# Patient Record
Sex: MALE | Race: WHITE | NOT HISPANIC OR LATINO | URBAN - METROPOLITAN AREA
[De-identification: names, ages, dates, MRNs, and addresses within clinical notes are randomized per-mention and may not be internally consistent; named-entity substitution may affect disease eponyms.]

---

## 2017-06-19 ENCOUNTER — OUTPATIENT (OUTPATIENT)
Dept: OUTPATIENT SERVICES | Facility: HOSPITAL | Age: 82
LOS: 1 days | End: 2017-06-19
Payer: COMMERCIAL

## 2017-06-19 VITALS
TEMPERATURE: 98 F | HEART RATE: 57 BPM | HEIGHT: 71 IN | OXYGEN SATURATION: 95 % | RESPIRATION RATE: 16 BRPM | WEIGHT: 207.9 LBS | SYSTOLIC BLOOD PRESSURE: 175 MMHG | DIASTOLIC BLOOD PRESSURE: 77 MMHG

## 2017-06-19 DIAGNOSIS — H26.9 UNSPECIFIED CATARACT: Chronic | ICD-10-CM

## 2017-06-19 DIAGNOSIS — H33.21 SEROUS RETINAL DETACHMENT, RIGHT EYE: Chronic | ICD-10-CM

## 2017-06-19 DIAGNOSIS — R06.09 OTHER FORMS OF DYSPNEA: ICD-10-CM

## 2017-06-19 DIAGNOSIS — Z96.643 PRESENCE OF ARTIFICIAL HIP JOINT, BILATERAL: Chronic | ICD-10-CM

## 2017-06-19 LAB
ANION GAP SERPL CALC-SCNC: 14 MMOL/L — SIGNIFICANT CHANGE UP (ref 5–17)
BUN SERPL-MCNC: 20 MG/DL — SIGNIFICANT CHANGE UP (ref 7–23)
CALCIUM SERPL-MCNC: 9.1 MG/DL — SIGNIFICANT CHANGE UP (ref 8.4–10.5)
CHLORIDE SERPL-SCNC: 103 MMOL/L — SIGNIFICANT CHANGE UP (ref 96–108)
CO2 SERPL-SCNC: 24 MMOL/L — SIGNIFICANT CHANGE UP (ref 22–31)
CREAT SERPL-MCNC: 0.96 MG/DL — SIGNIFICANT CHANGE UP (ref 0.5–1.3)
GLUCOSE SERPL-MCNC: 106 MG/DL — HIGH (ref 70–99)
HCT VFR BLD CALC: 46.7 % — SIGNIFICANT CHANGE UP (ref 39–50)
HGB BLD-MCNC: 15.4 G/DL — SIGNIFICANT CHANGE UP (ref 13–17)
MCHC RBC-ENTMCNC: 29.9 PG — SIGNIFICANT CHANGE UP (ref 27–34)
MCHC RBC-ENTMCNC: 33 GM/DL — SIGNIFICANT CHANGE UP (ref 32–36)
MCV RBC AUTO: 90.5 FL — SIGNIFICANT CHANGE UP (ref 80–100)
PLATELET # BLD AUTO: 181 K/UL — SIGNIFICANT CHANGE UP (ref 150–400)
POTASSIUM SERPL-MCNC: 4.1 MMOL/L — SIGNIFICANT CHANGE UP (ref 3.5–5.3)
POTASSIUM SERPL-SCNC: 4.1 MMOL/L — SIGNIFICANT CHANGE UP (ref 3.5–5.3)
RBC # BLD: 5.16 M/UL — SIGNIFICANT CHANGE UP (ref 4.2–5.8)
RBC # FLD: 12.5 % — SIGNIFICANT CHANGE UP (ref 10.3–14.5)
SODIUM SERPL-SCNC: 141 MMOL/L — SIGNIFICANT CHANGE UP (ref 135–145)
WBC # BLD: 7.1 K/UL — SIGNIFICANT CHANGE UP (ref 3.8–10.5)
WBC # FLD AUTO: 7.1 K/UL — SIGNIFICANT CHANGE UP (ref 3.8–10.5)

## 2017-06-19 PROCEDURE — 85027 COMPLETE CBC AUTOMATED: CPT

## 2017-06-19 PROCEDURE — 93458 L HRT ARTERY/VENTRICLE ANGIO: CPT

## 2017-06-19 PROCEDURE — 80048 BASIC METABOLIC PNL TOTAL CA: CPT

## 2017-06-19 PROCEDURE — C1769: CPT

## 2017-06-19 PROCEDURE — C1894: CPT

## 2017-06-19 PROCEDURE — 93010 ELECTROCARDIOGRAM REPORT: CPT | Mod: 59

## 2017-06-19 PROCEDURE — C1887: CPT

## 2017-06-19 PROCEDURE — 93005 ELECTROCARDIOGRAM TRACING: CPT

## 2017-06-19 PROCEDURE — 93458 L HRT ARTERY/VENTRICLE ANGIO: CPT | Mod: 26,GC

## 2017-06-19 NOTE — H&P CARDIOLOGY - FAMILY HISTORY
<<-----Click on this checkbox to enter Family History Family history of hypertension in mother     Father  Still living? No  Family history of heart disease, Age at diagnosis: Age Unknown

## 2017-06-19 NOTE — H&P CARDIOLOGY - HISTORY OF PRESENT ILLNESS
83 year old male h/o MI, CAD s/p stents RCA, LAD, HTN, HLD, BPH, anxiety, bilat LE neuropathy, mild-mod AI, mild COPD pt had nuclear ST last Jan revealing small, mod severe fixed defect in apex, EF 69%. TTE 2016: no WMA, EF 68%, dilatate LV, mild-mod AI, moderate PAH, diastolic dysfucntion who presents for C due to progressive RIVERA with inclines and with greater than 2 flights of stairs. Pt denies chest pain.     Cards: Dr Manuel

## 2017-06-19 NOTE — H&P CARDIOLOGY - PMH
Anxiety    BPH (Benign Prostatic Hyperplasia)    CAD (coronary artery disease)    COPD (chronic obstructive pulmonary disease)  mild (no home oxygen)  Hyperlipemia    Hypertension Anxiety    BPH (Benign Prostatic Hyperplasia)    CAD (coronary artery disease)    COPD (chronic obstructive pulmonary disease)  mild (no home oxygen)  Hyperlipemia    Hypertension    Pulmonary hypertension  moderate

## 2017-07-21 PROBLEM — Z86.39 HISTORY OF HYPERLIPIDEMIA: Status: RESOLVED | Noted: 2017-07-21 | Resolved: 2017-07-21

## 2017-07-21 PROBLEM — Z82.49 FAMILY HISTORY OF HYPERTENSION: Status: ACTIVE | Noted: 2017-07-21

## 2017-07-21 PROBLEM — Z82.49 FAMILY HISTORY OF CARDIAC DISORDER: Status: ACTIVE | Noted: 2017-07-21

## 2017-07-21 PROBLEM — Z86.79 HISTORY OF HYPERTENSION: Status: RESOLVED | Noted: 2017-07-21 | Resolved: 2017-07-21

## 2017-07-21 PROBLEM — G62.9 NEUROPATHY: Status: ACTIVE | Noted: 2017-07-21

## 2017-07-21 RX ORDER — ASPIRIN 325 MG/1
325 TABLET ORAL
Refills: 0 | Status: ACTIVE | COMMUNITY

## 2017-07-21 RX ORDER — PRAVASTATIN SODIUM 40 MG/1
40 TABLET ORAL
Refills: 0 | Status: ACTIVE | COMMUNITY

## 2017-07-21 RX ORDER — METOPROLOL TARTRATE 25 MG/1
25 TABLET, FILM COATED ORAL
Refills: 0 | Status: ACTIVE | COMMUNITY

## 2017-07-24 ENCOUNTER — LABORATORY RESULT (OUTPATIENT)
Age: 82
End: 2017-07-24

## 2017-07-24 ENCOUNTER — APPOINTMENT (OUTPATIENT)
Dept: PULMONOLOGY | Facility: CLINIC | Age: 82
End: 2017-07-24

## 2017-07-24 VITALS
SYSTOLIC BLOOD PRESSURE: 130 MMHG | TEMPERATURE: 98.4 F | HEIGHT: 67 IN | DIASTOLIC BLOOD PRESSURE: 66 MMHG | RESPIRATION RATE: 15 BRPM | WEIGHT: 217 LBS | HEART RATE: 58 BPM | BODY MASS INDEX: 34.06 KG/M2

## 2017-07-24 DIAGNOSIS — Z86.79 PERSONAL HISTORY OF OTHER DISEASES OF THE CIRCULATORY SYSTEM: ICD-10-CM

## 2017-07-24 DIAGNOSIS — Z82.49 FAMILY HISTORY OF ISCHEMIC HEART DISEASE AND OTHER DISEASES OF THE CIRCULATORY SYSTEM: ICD-10-CM

## 2017-07-24 DIAGNOSIS — R06.83 SNORING: ICD-10-CM

## 2017-07-24 DIAGNOSIS — Z86.39 PERSONAL HISTORY OF OTHER ENDOCRINE, NUTRITIONAL AND METABOLIC DISEASE: ICD-10-CM

## 2017-07-24 DIAGNOSIS — G62.9 POLYNEUROPATHY, UNSPECIFIED: ICD-10-CM

## 2017-07-24 RX ORDER — GABAPENTIN 300 MG/1
300 CAPSULE ORAL
Refills: 0 | Status: DISCONTINUED | COMMUNITY
End: 2017-07-24

## 2017-07-24 RX ORDER — ALPRAZOLAM 0.5 MG/1
0.5 TABLET ORAL
Refills: 0 | Status: DISCONTINUED | COMMUNITY
End: 2017-07-24

## 2017-07-25 LAB
25(OH)D3 SERPL-MCNC: 38.1 NG/ML
A1AT SERPL-MCNC: 141 MG/DL
ALBUMIN SERPL ELPH-MCNC: 4.6 G/DL
ALP BLD-CCNC: 59 U/L
ALT SERPL-CCNC: 23 U/L
ANION GAP SERPL CALC-SCNC: 14 MMOL/L
APTT BLD: 28.9 SEC
AST SERPL-CCNC: 23 U/L
B2 MICROGLOB SERPL-MCNC: 2.2 MG/L
BASOPHILS # BLD AUTO: 0.03 K/UL
BASOPHILS NFR BLD AUTO: 0.4 %
BILIRUB SERPL-MCNC: 0.6 MG/DL
BUN SERPL-MCNC: 22 MG/DL
CALCIUM SERPL-MCNC: 9.7 MG/DL
CALCIUM SERPL-MCNC: 9.7 MG/DL
CHLORIDE SERPL-SCNC: 104 MMOL/L
CO2 SERPL-SCNC: 25 MMOL/L
CREAT SERPL-MCNC: 1.05 MG/DL
CRP SERPL-MCNC: <0.2 MG/DL
DEPRECATED KAPPA LC FREE/LAMBDA SER: 1.33 RATIO
ENA SCL70 IGG SER IA-ACNC: <0.2 AL
ENA SS-A AB SER IA-ACNC: <0.2 AL
ENA SS-B AB SER IA-ACNC: <0.2 AL
EOSINOPHIL # BLD AUTO: 0.19 K/UL
EOSINOPHIL NFR BLD AUTO: 2.6 %
ERYTHROCYTE [SEDIMENTATION RATE] IN BLOOD BY WESTERGREN METHOD: 5 MM/HR
FOLATE RBC-MCNC: 1800 NG/ML
GLUCOSE SERPL-MCNC: 94 MG/DL
HBA1C MFR BLD HPLC: 5.4 %
HCT VFR BLD CALC: 44 %
HCT VFR BLD CALC: 44 %
HCYS SERPL-MCNC: 9.4 UMOL/L
HGB BLD-MCNC: 14.6 G/DL
IGA SER QL IEP: 102 MG/DL
IGG SER QL IEP: 975 MG/DL
IGM SER QL IEP: 215 MG/DL
IMM GRANULOCYTES NFR BLD AUTO: 0.3 %
INR PPP: 1.03 RATIO
KAPPA LC CSF-MCNC: 1.55 MG/DL
KAPPA LC SERPL-MCNC: 2.06 MG/DL
LYMPHOCYTES # BLD AUTO: 1.35 K/UL
LYMPHOCYTES NFR BLD AUTO: 18.5 %
MAN DIFF?: NORMAL
MCHC RBC-ENTMCNC: 29.6 PG
MCHC RBC-ENTMCNC: 33.2 GM/DL
MCV RBC AUTO: 89.1 FL
MONOCYTES # BLD AUTO: 0.51 K/UL
MONOCYTES NFR BLD AUTO: 7 %
MPO AB + PR3 PNL SER: NORMAL
NEUTROPHILS # BLD AUTO: 5.19 K/UL
NEUTROPHILS NFR BLD AUTO: 71.2 %
NT-PROBNP SERPL-MCNC: 209 PG/ML
PARATHYROID HORMONE INTACT: 59 PG/ML
PHOSPHATE SERPL-MCNC: 2.5 MG/DL
PLATELET # BLD AUTO: 198 K/UL
POTASSIUM SERPL-SCNC: 4.6 MMOL/L
PROT SERPL-MCNC: 7.3 G/DL
PT BLD: 11.7 SEC
RBC # BLD: 4.94 M/UL
RBC # FLD: 13.8 %
RHEUMATOID FACT SER QL: <7 IU/ML
SODIUM SERPL-SCNC: 143 MMOL/L
T3 SERPL-MCNC: 108 NG/DL
T3FREE SERPL-MCNC: 3.39 PG/ML
T3RU NFR SERPL: 0.86 INDEX
T4 FREE SERPL-MCNC: 1.4 NG/DL
TSH SERPL-ACNC: 1.89 UIU/ML
URATE SERPL-MCNC: 4.3 MG/DL
VIT B12 SERPL-MCNC: 322 PG/ML
WBC # FLD AUTO: 7.29 K/UL

## 2017-07-26 LAB
ANA PAT FLD IF-IMP: ABNORMAL
ANACR T: ABNORMAL
CCP AB SER IA-ACNC: <8 UNITS
RF+CCP IGG SER-IMP: NEGATIVE

## 2017-07-27 ENCOUNTER — APPOINTMENT (OUTPATIENT)
Dept: CT IMAGING | Facility: CLINIC | Age: 82
End: 2017-07-27
Payer: COMMERCIAL

## 2017-07-27 ENCOUNTER — OUTPATIENT (OUTPATIENT)
Dept: OUTPATIENT SERVICES | Facility: HOSPITAL | Age: 82
LOS: 1 days | End: 2017-07-27
Payer: COMMERCIAL

## 2017-07-27 DIAGNOSIS — H33.21 SEROUS RETINAL DETACHMENT, RIGHT EYE: Chronic | ICD-10-CM

## 2017-07-27 DIAGNOSIS — I27.2 OTHER SECONDARY PULMONARY HYPERTENSION: ICD-10-CM

## 2017-07-27 DIAGNOSIS — Z96.643 PRESENCE OF ARTIFICIAL HIP JOINT, BILATERAL: Chronic | ICD-10-CM

## 2017-07-27 DIAGNOSIS — H26.9 UNSPECIFIED CATARACT: Chronic | ICD-10-CM

## 2017-07-27 LAB — COLLAGEN CTX SERPL-MCNC: 210 PG/ML

## 2017-07-27 PROCEDURE — 71250 CT THORAX DX C-: CPT

## 2017-07-27 PROCEDURE — 71250 CT THORAX DX C-: CPT | Mod: 26

## 2017-07-28 LAB
CARDIOLIPIN AB SER IA-ACNC: POSITIVE
TOTAL IGE SMQN RAST: 106 KU/L

## 2017-07-31 LAB — T4 FREE SERPL DIALY-MCNC: 1 NG/DL

## 2017-08-01 ENCOUNTER — TRANSCRIPTION ENCOUNTER (OUTPATIENT)
Age: 82
End: 2017-08-01

## 2017-09-09 ENCOUNTER — OUTPATIENT (OUTPATIENT)
Dept: OUTPATIENT SERVICES | Facility: HOSPITAL | Age: 82
LOS: 1 days | End: 2017-09-09
Payer: COMMERCIAL

## 2017-09-09 ENCOUNTER — APPOINTMENT (OUTPATIENT)
Dept: SLEEP CENTER | Facility: CLINIC | Age: 82
End: 2017-09-09
Payer: COMMERCIAL

## 2017-09-09 DIAGNOSIS — Z96.643 PRESENCE OF ARTIFICIAL HIP JOINT, BILATERAL: Chronic | ICD-10-CM

## 2017-09-09 DIAGNOSIS — H26.9 UNSPECIFIED CATARACT: Chronic | ICD-10-CM

## 2017-09-09 DIAGNOSIS — H33.21 SEROUS RETINAL DETACHMENT, RIGHT EYE: Chronic | ICD-10-CM

## 2017-09-09 PROCEDURE — 95810 POLYSOM 6/> YRS 4/> PARAM: CPT

## 2017-09-09 PROCEDURE — 95810 POLYSOM 6/> YRS 4/> PARAM: CPT | Mod: 26

## 2017-09-11 DIAGNOSIS — G47.33 OBSTRUCTIVE SLEEP APNEA (ADULT) (PEDIATRIC): ICD-10-CM

## 2017-09-20 ENCOUNTER — RESULT REVIEW (OUTPATIENT)
Age: 82
End: 2017-09-20

## 2017-09-21 ENCOUNTER — APPOINTMENT (OUTPATIENT)
Dept: PULMONOLOGY | Facility: CLINIC | Age: 82
End: 2017-09-21
Payer: COMMERCIAL

## 2017-09-21 VITALS
TEMPERATURE: 98.4 F | RESPIRATION RATE: 15 BRPM | DIASTOLIC BLOOD PRESSURE: 64 MMHG | SYSTOLIC BLOOD PRESSURE: 120 MMHG | HEART RATE: 55 BPM | WEIGHT: 216 LBS | BODY MASS INDEX: 33.9 KG/M2 | HEIGHT: 67 IN

## 2017-09-21 PROCEDURE — 99215 OFFICE O/P EST HI 40 MIN: CPT

## 2017-11-10 ENCOUNTER — OUTPATIENT (OUTPATIENT)
Dept: OUTPATIENT SERVICES | Facility: HOSPITAL | Age: 82
LOS: 1 days | End: 2017-11-10
Payer: COMMERCIAL

## 2017-11-10 ENCOUNTER — APPOINTMENT (OUTPATIENT)
Dept: SLEEP CENTER | Facility: CLINIC | Age: 82
End: 2017-11-10
Payer: COMMERCIAL

## 2017-11-10 DIAGNOSIS — Z96.643 PRESENCE OF ARTIFICIAL HIP JOINT, BILATERAL: Chronic | ICD-10-CM

## 2017-11-10 DIAGNOSIS — H26.9 UNSPECIFIED CATARACT: Chronic | ICD-10-CM

## 2017-11-10 DIAGNOSIS — H33.21 SEROUS RETINAL DETACHMENT, RIGHT EYE: Chronic | ICD-10-CM

## 2017-11-10 PROCEDURE — 95811 POLYSOM 6/>YRS CPAP 4/> PARM: CPT | Mod: 26

## 2017-11-10 PROCEDURE — 95811 POLYSOM 6/>YRS CPAP 4/> PARM: CPT

## 2017-11-12 ENCOUNTER — EMERGENCY (EMERGENCY)
Facility: HOSPITAL | Age: 82
LOS: 1 days | Discharge: HOME | End: 2017-11-12
Attending: EMERGENCY MEDICINE | Admitting: EMERGENCY MEDICINE
Payer: COMMERCIAL

## 2017-11-12 VITALS
RESPIRATION RATE: 16 BRPM | HEART RATE: 94 BPM | OXYGEN SATURATION: 96 % | DIASTOLIC BLOOD PRESSURE: 73 MMHG | WEIGHT: 210.1 LBS | TEMPERATURE: 99 F | HEIGHT: 71 IN | SYSTOLIC BLOOD PRESSURE: 168 MMHG

## 2017-11-12 VITALS
HEART RATE: 88 BPM | SYSTOLIC BLOOD PRESSURE: 167 MMHG | OXYGEN SATURATION: 97 % | DIASTOLIC BLOOD PRESSURE: 70 MMHG | RESPIRATION RATE: 16 BRPM | TEMPERATURE: 98 F

## 2017-11-12 DIAGNOSIS — Z96.643 PRESENCE OF ARTIFICIAL HIP JOINT, BILATERAL: Chronic | ICD-10-CM

## 2017-11-12 DIAGNOSIS — H26.9 UNSPECIFIED CATARACT: Chronic | ICD-10-CM

## 2017-11-12 DIAGNOSIS — H33.21 SEROUS RETINAL DETACHMENT, RIGHT EYE: Chronic | ICD-10-CM

## 2017-11-12 LAB
ALBUMIN SERPL ELPH-MCNC: 3.6 G/DL — SIGNIFICANT CHANGE UP (ref 3.3–5)
ALP SERPL-CCNC: 57 U/L — SIGNIFICANT CHANGE UP (ref 30–120)
ALT FLD-CCNC: 19 U/L DA — SIGNIFICANT CHANGE UP (ref 10–60)
AMYLASE P1 CFR SERPL: 50 U/L — SIGNIFICANT CHANGE UP (ref 25–125)
ANION GAP SERPL CALC-SCNC: 5 MMOL/L — SIGNIFICANT CHANGE UP (ref 5–17)
AST SERPL-CCNC: 26 U/L — SIGNIFICANT CHANGE UP (ref 10–40)
BASOPHILS # BLD AUTO: 0.1 K/UL — SIGNIFICANT CHANGE UP (ref 0–0.2)
BASOPHILS NFR BLD AUTO: 0.9 % — SIGNIFICANT CHANGE UP (ref 0–2)
BILIRUB SERPL-MCNC: 0.8 MG/DL — SIGNIFICANT CHANGE UP (ref 0.2–1.2)
BUN SERPL-MCNC: 21 MG/DL — SIGNIFICANT CHANGE UP (ref 7–23)
CALCIUM SERPL-MCNC: 8.9 MG/DL — SIGNIFICANT CHANGE UP (ref 8.4–10.5)
CHLORIDE SERPL-SCNC: 103 MMOL/L — SIGNIFICANT CHANGE UP (ref 96–108)
CK MB BLD-MCNC: 0.7 % — SIGNIFICANT CHANGE UP (ref 0–3.5)
CK MB CFR SERPL CALC: 2 NG/ML — SIGNIFICANT CHANGE UP (ref 0–3.6)
CK SERPL-CCNC: 283 U/L — SIGNIFICANT CHANGE UP (ref 39–308)
CO2 SERPL-SCNC: 28 MMOL/L — SIGNIFICANT CHANGE UP (ref 22–31)
CREAT SERPL-MCNC: 0.9 MG/DL — SIGNIFICANT CHANGE UP (ref 0.5–1.3)
EOSINOPHIL # BLD AUTO: 0.1 K/UL — SIGNIFICANT CHANGE UP (ref 0–0.5)
EOSINOPHIL NFR BLD AUTO: 0.4 % — SIGNIFICANT CHANGE UP (ref 0–6)
GLUCOSE SERPL-MCNC: 139 MG/DL — HIGH (ref 70–99)
HCT VFR BLD CALC: 45.3 % — SIGNIFICANT CHANGE UP (ref 39–50)
HGB BLD-MCNC: 14.8 G/DL — SIGNIFICANT CHANGE UP (ref 13–17)
LIDOCAIN IGE QN: 102 U/L — SIGNIFICANT CHANGE UP (ref 73–393)
LYMPHOCYTES # BLD AUTO: 0.7 K/UL — LOW (ref 1–3.3)
LYMPHOCYTES # BLD AUTO: 5 % — LOW (ref 13–44)
MCHC RBC-ENTMCNC: 29.1 PG — SIGNIFICANT CHANGE UP (ref 27–34)
MCHC RBC-ENTMCNC: 32.6 GM/DL — SIGNIFICANT CHANGE UP (ref 32–36)
MCV RBC AUTO: 89.2 FL — SIGNIFICANT CHANGE UP (ref 80–100)
MONOCYTES # BLD AUTO: 1 K/UL — HIGH (ref 0–0.9)
MONOCYTES NFR BLD AUTO: 7.1 % — SIGNIFICANT CHANGE UP (ref 2–14)
NEUTROPHILS # BLD AUTO: 11.9 K/UL — HIGH (ref 1.8–7.4)
NEUTROPHILS NFR BLD AUTO: 86.7 % — HIGH (ref 43–77)
PLATELET # BLD AUTO: 174 K/UL — SIGNIFICANT CHANGE UP (ref 150–400)
POTASSIUM SERPL-MCNC: 4.2 MMOL/L — SIGNIFICANT CHANGE UP (ref 3.5–5.3)
POTASSIUM SERPL-SCNC: 4.2 MMOL/L — SIGNIFICANT CHANGE UP (ref 3.5–5.3)
PROT SERPL-MCNC: 7 G/DL — SIGNIFICANT CHANGE UP (ref 6–8.3)
RBC # BLD: 5.08 M/UL — SIGNIFICANT CHANGE UP (ref 4.2–5.8)
RBC # FLD: 12.6 % — SIGNIFICANT CHANGE UP (ref 10.3–14.5)
SODIUM SERPL-SCNC: 136 MMOL/L — SIGNIFICANT CHANGE UP (ref 135–145)
TROPONIN I SERPL-MCNC: 0.01 NG/ML — LOW (ref 0.02–0.06)
WBC # BLD: 13.7 K/UL — HIGH (ref 3.8–10.5)
WBC # FLD AUTO: 13.7 K/UL — HIGH (ref 3.8–10.5)

## 2017-11-12 PROCEDURE — 84484 ASSAY OF TROPONIN QUANT: CPT

## 2017-11-12 PROCEDURE — 83690 ASSAY OF LIPASE: CPT

## 2017-11-12 PROCEDURE — 85027 COMPLETE CBC AUTOMATED: CPT

## 2017-11-12 PROCEDURE — 82150 ASSAY OF AMYLASE: CPT

## 2017-11-12 PROCEDURE — 36415 COLL VENOUS BLD VENIPUNCTURE: CPT

## 2017-11-12 PROCEDURE — 82550 ASSAY OF CK (CPK): CPT

## 2017-11-12 PROCEDURE — 99283 EMERGENCY DEPT VISIT LOW MDM: CPT | Mod: 25

## 2017-11-12 PROCEDURE — 71046 X-RAY EXAM CHEST 2 VIEWS: CPT

## 2017-11-12 PROCEDURE — 71020: CPT | Mod: 26

## 2017-11-12 PROCEDURE — 80053 COMPREHEN METABOLIC PANEL: CPT

## 2017-11-12 PROCEDURE — 99284 EMERGENCY DEPT VISIT MOD MDM: CPT

## 2017-11-12 PROCEDURE — 82553 CREATINE MB FRACTION: CPT

## 2017-11-12 RX ORDER — FLUTICASONE PROPIONATE 50 MCG
1 SPRAY, SUSPENSION NASAL
Qty: 0 | Refills: 0 | COMMUNITY

## 2017-11-12 RX ORDER — OXYBUTYNIN CHLORIDE 5 MG
0 TABLET ORAL
Qty: 0 | Refills: 0 | COMMUNITY

## 2017-11-12 RX ORDER — FAMOTIDINE 10 MG/ML
20 INJECTION INTRAVENOUS ONCE
Qty: 0 | Refills: 0 | Status: COMPLETED | OUTPATIENT
Start: 2017-11-12 | End: 2017-11-12

## 2017-11-12 RX ORDER — FAMOTIDINE 10 MG/ML
20 INJECTION INTRAVENOUS ONCE
Qty: 0 | Refills: 0 | Status: DISCONTINUED | OUTPATIENT
Start: 2017-11-12 | End: 2017-11-12

## 2017-11-12 RX ADMIN — Medication 30 MILLILITER(S): at 21:56

## 2017-11-12 RX ADMIN — FAMOTIDINE 20 MILLIGRAM(S): 10 INJECTION INTRAVENOUS at 21:59

## 2017-11-12 NOTE — ED PROVIDER NOTE - MEDICAL DECISION MAKING DETAILS
83 yo m with hx of MI, GERD, HTN with upper abdominal pain, n/v/d x 1 day, will get labs, ekg, cxr, cards, pepcid/maalox, re-assess; Feeling better, labs reviewed wnl, will d/c home, f/u pmd tomorrow.

## 2017-11-12 NOTE — ED PROVIDER NOTE - OBJECTIVE STATEMENT
85 y/o M 85 y/o M with hx of MI, stents x 2, GERD, sleep apnea, HTN, HLD, enlarged prostate presents with c/o upper abdominal pain x 1 day. Pt states that he ate soup yesterday and had indigestion with 2 episodes of vomiting last night and few episodes of non-bloody diarrhea as well. States that pt started having upper abdominal pain this afternoon and had an episode of cold sweats. Denies chest pain, shortness of breath, fever, dizziness, vision changes, numbness, tingling, weakness, syncope, recent travel, sick contacts or other symptoms. States that he took aspirin today prior to arrival.

## 2017-11-12 NOTE — ED PROVIDER NOTE - ABDOMINAL EXAM
no organomegaly/obese, +mild diffuse upper abdomen ttp, no rebound or guarding, neg murphys sign, neg mcburneys point tenderness, no CVAT B/no pulsating masses/tender.../soft

## 2017-11-12 NOTE — ED PROVIDER NOTE - ATTENDING CONTRIBUTION TO CARE
Ze Shelton MD: I have personally performed a face to face diagnostic evaluation on this patient.  I have reviewed the PA note and agree with the history, exam, and plan of care, except as noted.  History and Exam by me shows same findings as documented

## 2017-11-12 NOTE — ED PROVIDER NOTE - PROGRESS NOTE DETAILS
Patient seen and examined. Mild epigastric tenderness. Exam o/w unremarkable. Will check ekg and a set of enzymes Pt examined by ED attending, Dr. Shelton who agreed with disposition and plan.   Labs, ekg and cxr reviewed with attending, advised to d/c home and f/u pmd  Pt re-assessed and feeling better, neg cards, lfts wnl, ekg and cxr wnl. Attending note: Patient seen and examined. Mild epigastric tenderness. Exam o/w unremarkable. Will check ekg and a set of enzymes

## 2017-11-12 NOTE — ED ADULT TRIAGE NOTE - CHIEF COMPLAINT QUOTE
" I have a severe heart burn and I vomited yesterday. This after I started having lower abdominal pain and upper back pain "

## 2017-11-12 NOTE — ED ADULT NURSE NOTE - PMH
Anxiety    BPH (Benign Prostatic Hyperplasia)    CAD (coronary artery disease)    COPD (chronic obstructive pulmonary disease)  mild (no home oxygen)  Hyperlipemia    Hypertension    Pulmonary hypertension  moderate

## 2017-11-13 DIAGNOSIS — G47.33 OBSTRUCTIVE SLEEP APNEA (ADULT) (PEDIATRIC): ICD-10-CM

## 2017-12-13 ENCOUNTER — APPOINTMENT (OUTPATIENT)
Dept: UROLOGY | Facility: CLINIC | Age: 82
End: 2017-12-13
Payer: COMMERCIAL

## 2017-12-13 VITALS
HEIGHT: 67 IN | SYSTOLIC BLOOD PRESSURE: 121 MMHG | WEIGHT: 210 LBS | HEART RATE: 58 BPM | BODY MASS INDEX: 32.96 KG/M2 | TEMPERATURE: 98 F | DIASTOLIC BLOOD PRESSURE: 58 MMHG | OXYGEN SATURATION: 97 %

## 2017-12-13 DIAGNOSIS — I25.2 OLD MYOCARDIAL INFARCTION: ICD-10-CM

## 2017-12-13 DIAGNOSIS — Z87.891 PERSONAL HISTORY OF NICOTINE DEPENDENCE: ICD-10-CM

## 2017-12-13 PROCEDURE — 99214 OFFICE O/P EST MOD 30 MIN: CPT

## 2017-12-13 RX ORDER — MIRABEGRON 25 MG/1
25 TABLET, FILM COATED, EXTENDED RELEASE ORAL
Refills: 0 | Status: DISCONTINUED | COMMUNITY
Start: 2017-09-21 | End: 2017-12-13

## 2018-01-16 ENCOUNTER — APPOINTMENT (OUTPATIENT)
Dept: PULMONOLOGY | Facility: CLINIC | Age: 83
End: 2018-01-16
Payer: MEDICARE

## 2018-01-16 VITALS
HEART RATE: 64 BPM | DIASTOLIC BLOOD PRESSURE: 70 MMHG | OXYGEN SATURATION: 98 % | WEIGHT: 209 LBS | RESPIRATION RATE: 18 BRPM | HEIGHT: 67 IN | TEMPERATURE: 97.5 F | SYSTOLIC BLOOD PRESSURE: 140 MMHG | BODY MASS INDEX: 32.8 KG/M2

## 2018-01-16 LAB
BASOPHILS # BLD AUTO: 0.04 K/UL
BASOPHILS NFR BLD AUTO: 0.5 %
EOSINOPHIL # BLD AUTO: 0.17 K/UL
EOSINOPHIL NFR BLD AUTO: 2 %
HCT VFR BLD CALC: 42.5 %
HGB BLD-MCNC: 13.9 G/DL
IMM GRANULOCYTES NFR BLD AUTO: 0.2 %
LYMPHOCYTES # BLD AUTO: 1.38 K/UL
LYMPHOCYTES NFR BLD AUTO: 16.2 %
MAN DIFF?: NORMAL
MCHC RBC-ENTMCNC: 29 PG
MCHC RBC-ENTMCNC: 32.7 GM/DL
MCV RBC AUTO: 88.5 FL
MONOCYTES # BLD AUTO: 0.8 K/UL
MONOCYTES NFR BLD AUTO: 9.4 %
NEUTROPHILS # BLD AUTO: 6.09 K/UL
NEUTROPHILS NFR BLD AUTO: 71.7 %
PLATELET # BLD AUTO: 216 K/UL
RBC # BLD: 4.8 M/UL
RBC # FLD: 13.8 %
WBC # FLD AUTO: 8.5 K/UL

## 2018-01-16 PROCEDURE — 36415 COLL VENOUS BLD VENIPUNCTURE: CPT

## 2018-01-16 PROCEDURE — 99215 OFFICE O/P EST HI 40 MIN: CPT | Mod: 25

## 2018-01-16 RX ORDER — FLUTICASONE FUROATE AND VILANTEROL TRIFENATATE 100; 25 UG/1; UG/1
100-25 POWDER RESPIRATORY (INHALATION) DAILY
Qty: 1 | Refills: 5 | Status: DISCONTINUED | COMMUNITY
Start: 2017-07-24 | End: 2018-01-16

## 2018-01-17 LAB
ALBUMIN SERPL ELPH-MCNC: 4.2 G/DL
ALP BLD-CCNC: 58 U/L
ALT SERPL-CCNC: 12 U/L
ANION GAP SERPL CALC-SCNC: 13 MMOL/L
AST SERPL-CCNC: 16 U/L
BILIRUB SERPL-MCNC: 0.5 MG/DL
BUN SERPL-MCNC: 20 MG/DL
CALCIUM SERPL-MCNC: 9.3 MG/DL
CHLORIDE SERPL-SCNC: 104 MMOL/L
CO2 SERPL-SCNC: 24 MMOL/L
CREAT SERPL-MCNC: 0.98 MG/DL
GLUCOSE SERPL-MCNC: 112 MG/DL
POTASSIUM SERPL-SCNC: 4.5 MMOL/L
PROT SERPL-MCNC: 7.2 G/DL
SODIUM SERPL-SCNC: 141 MMOL/L

## 2018-01-18 ENCOUNTER — APPOINTMENT (OUTPATIENT)
Dept: SLEEP CENTER | Facility: CLINIC | Age: 83
End: 2018-01-18
Payer: COMMERCIAL

## 2018-01-18 ENCOUNTER — OUTPATIENT (OUTPATIENT)
Dept: OUTPATIENT SERVICES | Facility: HOSPITAL | Age: 83
LOS: 1 days | End: 2018-01-18
Payer: COMMERCIAL

## 2018-01-18 DIAGNOSIS — H33.21 SEROUS RETINAL DETACHMENT, RIGHT EYE: Chronic | ICD-10-CM

## 2018-01-18 DIAGNOSIS — H26.9 UNSPECIFIED CATARACT: Chronic | ICD-10-CM

## 2018-01-18 DIAGNOSIS — Z96.643 PRESENCE OF ARTIFICIAL HIP JOINT, BILATERAL: Chronic | ICD-10-CM

## 2018-01-18 PROCEDURE — 95811 POLYSOM 6/>YRS CPAP 4/> PARM: CPT

## 2018-01-18 PROCEDURE — 95811 POLYSOM 6/>YRS CPAP 4/> PARM: CPT | Mod: 26

## 2018-01-19 DIAGNOSIS — G47.33 OBSTRUCTIVE SLEEP APNEA (ADULT) (PEDIATRIC): ICD-10-CM

## 2018-01-31 ENCOUNTER — RESULT REVIEW (OUTPATIENT)
Age: 83
End: 2018-01-31

## 2018-01-31 DIAGNOSIS — G47.31 PRIMARY CENTRAL SLEEP APNEA: ICD-10-CM

## 2018-02-09 ENCOUNTER — OUTPATIENT (OUTPATIENT)
Dept: OUTPATIENT SERVICES | Facility: HOSPITAL | Age: 83
LOS: 1 days | End: 2018-02-09
Payer: COMMERCIAL

## 2018-02-09 ENCOUNTER — APPOINTMENT (OUTPATIENT)
Dept: CT IMAGING | Facility: CLINIC | Age: 83
End: 2018-02-09

## 2018-02-09 DIAGNOSIS — Z00.8 ENCOUNTER FOR OTHER GENERAL EXAMINATION: ICD-10-CM

## 2018-02-09 DIAGNOSIS — H26.9 UNSPECIFIED CATARACT: Chronic | ICD-10-CM

## 2018-02-09 DIAGNOSIS — Z96.643 PRESENCE OF ARTIFICIAL HIP JOINT, BILATERAL: Chronic | ICD-10-CM

## 2018-02-09 DIAGNOSIS — H33.21 SEROUS RETINAL DETACHMENT, RIGHT EYE: Chronic | ICD-10-CM

## 2018-02-09 PROCEDURE — 71250 CT THORAX DX C-: CPT | Mod: 26

## 2018-02-09 PROCEDURE — 71250 CT THORAX DX C-: CPT

## 2018-05-24 ENCOUNTER — APPOINTMENT (OUTPATIENT)
Dept: PULMONOLOGY | Facility: CLINIC | Age: 83
End: 2018-05-24
Payer: COMMERCIAL

## 2018-05-24 VITALS
DIASTOLIC BLOOD PRESSURE: 70 MMHG | WEIGHT: 214 LBS | OXYGEN SATURATION: 94 % | HEART RATE: 59 BPM | HEIGHT: 67 IN | TEMPERATURE: 97.4 F | BODY MASS INDEX: 33.59 KG/M2 | SYSTOLIC BLOOD PRESSURE: 120 MMHG | RESPIRATION RATE: 18 BRPM

## 2018-05-24 PROCEDURE — 99215 OFFICE O/P EST HI 40 MIN: CPT

## 2018-05-24 RX ORDER — LOSARTAN POTASSIUM 100 MG/1
100 TABLET, FILM COATED ORAL DAILY
Qty: 30 | Refills: 0 | Status: ACTIVE | COMMUNITY
Start: 2018-05-24

## 2018-05-24 RX ORDER — FOSINOPRIL SODIUM 40 MG
40 TABLET ORAL
Refills: 0 | Status: DISCONTINUED | COMMUNITY
End: 2018-05-24

## 2018-05-25 LAB
ALBUMIN SERPL ELPH-MCNC: 4.1 G/DL
ALP BLD-CCNC: 62 U/L
ALT SERPL-CCNC: 17 U/L
ANION GAP SERPL CALC-SCNC: 12 MMOL/L
AST SERPL-CCNC: 18 U/L
BASOPHILS # BLD AUTO: 0.02 K/UL
BASOPHILS NFR BLD AUTO: 0.3 %
BILIRUB SERPL-MCNC: 0.8 MG/DL
BUN SERPL-MCNC: 20 MG/DL
CALCIUM SERPL-MCNC: 9.6 MG/DL
CHLORIDE SERPL-SCNC: 101 MMOL/L
CO2 SERPL-SCNC: 27 MMOL/L
CREAT SERPL-MCNC: 1.17 MG/DL
EOSINOPHIL # BLD AUTO: 0.29 K/UL
EOSINOPHIL NFR BLD AUTO: 3.7 %
GLUCOSE SERPL-MCNC: 99 MG/DL
HCT VFR BLD CALC: 42.6 %
HGB BLD-MCNC: 13.9 G/DL
IMM GRANULOCYTES NFR BLD AUTO: 0.3 %
LYMPHOCYTES # BLD AUTO: 1.51 K/UL
LYMPHOCYTES NFR BLD AUTO: 19.4 %
MAN DIFF?: NORMAL
MCHC RBC-ENTMCNC: 29.4 PG
MCHC RBC-ENTMCNC: 32.6 GM/DL
MCV RBC AUTO: 90.1 FL
MONOCYTES # BLD AUTO: 0.52 K/UL
MONOCYTES NFR BLD AUTO: 6.7 %
NEUTROPHILS # BLD AUTO: 5.41 K/UL
NEUTROPHILS NFR BLD AUTO: 69.6 %
PLATELET # BLD AUTO: 215 K/UL
POTASSIUM SERPL-SCNC: 3.9 MMOL/L
PROT SERPL-MCNC: 6.9 G/DL
RBC # BLD: 4.73 M/UL
RBC # FLD: 13.9 %
SODIUM SERPL-SCNC: 140 MMOL/L
TSH SERPL-ACNC: 1.8 UIU/ML
WBC # FLD AUTO: 7.77 K/UL

## 2018-05-29 LAB — NT-PROBNP SERPL-MCNC: 156 PG/ML

## 2018-07-18 PROBLEM — I27.2 OTHER SECONDARY PULMONARY HYPERTENSION: Chronic | Status: ACTIVE | Noted: 2017-06-19

## 2018-07-18 PROBLEM — J44.9 CHRONIC OBSTRUCTIVE PULMONARY DISEASE, UNSPECIFIED: Chronic | Status: ACTIVE | Noted: 2017-06-19

## 2018-08-01 ENCOUNTER — APPOINTMENT (OUTPATIENT)
Dept: UROLOGY | Facility: CLINIC | Age: 83
End: 2018-08-01
Payer: MEDICARE

## 2018-08-01 VITALS
RESPIRATION RATE: 18 BRPM | SYSTOLIC BLOOD PRESSURE: 111 MMHG | OXYGEN SATURATION: 95 % | HEIGHT: 67 IN | BODY MASS INDEX: 33.27 KG/M2 | DIASTOLIC BLOOD PRESSURE: 65 MMHG | WEIGHT: 212 LBS | HEART RATE: 64 BPM

## 2018-08-01 DIAGNOSIS — Z87.09 PERSONAL HISTORY OF OTHER DISEASES OF THE RESPIRATORY SYSTEM: ICD-10-CM

## 2018-08-01 DIAGNOSIS — H33.311 HORSESHOE TEAR OF RETINA W/OUT DETACHMENT, RIGHT EYE: ICD-10-CM

## 2018-08-01 DIAGNOSIS — Z82.49 FAMILY HISTORY OF ISCHEMIC HEART DISEASE AND OTHER DISEASES OF THE CIRCULATORY SYSTEM: ICD-10-CM

## 2018-08-01 DIAGNOSIS — Z86.69 PERSONAL HISTORY OF OTHER DISEASES OF THE NERVOUS SYSTEM AND SENSE ORGANS: ICD-10-CM

## 2018-08-01 DIAGNOSIS — Z82.3 FAMILY HISTORY OF STROKE: ICD-10-CM

## 2018-08-01 PROCEDURE — 99213 OFFICE O/P EST LOW 20 MIN: CPT

## 2018-08-01 RX ORDER — AZELASTINE HYDROCHLORIDE 0.5 MG/ML
0.05 SOLUTION/ DROPS OPHTHALMIC
Refills: 0 | Status: DISCONTINUED | COMMUNITY
End: 2018-08-01

## 2018-08-01 RX ORDER — FINASTERIDE 5 MG/1
5 TABLET, FILM COATED ORAL
Refills: 0 | Status: DISCONTINUED | COMMUNITY
End: 2018-08-01

## 2018-08-01 RX ORDER — TAMSULOSIN HCL 0.4 MG
0.4 CAPSULE ORAL
Refills: 0 | Status: COMPLETED | COMMUNITY
End: 2018-08-01

## 2018-08-05 PROBLEM — Z87.09 HISTORY OF CHRONIC OBSTRUCTIVE LUNG DISEASE: Status: RESOLVED | Noted: 2017-12-13 | Resolved: 2018-08-05

## 2018-08-05 PROBLEM — H33.311 RETINAL TEAR OF RIGHT EYE: Status: RESOLVED | Noted: 2017-12-13 | Resolved: 2018-08-05

## 2018-08-05 PROBLEM — Z86.69 HISTORY OF DOUBLE VISION: Status: RESOLVED | Noted: 2017-12-13 | Resolved: 2018-08-05

## 2018-09-01 ENCOUNTER — MOBILE ON CALL (OUTPATIENT)
Age: 83
End: 2018-09-01

## 2018-11-05 ENCOUNTER — RX RENEWAL (OUTPATIENT)
Age: 83
End: 2018-11-05

## 2018-11-05 ENCOUNTER — APPOINTMENT (OUTPATIENT)
Dept: PULMONOLOGY | Facility: CLINIC | Age: 83
End: 2018-11-05
Payer: COMMERCIAL

## 2018-11-05 VITALS
BODY MASS INDEX: 34.37 KG/M2 | DIASTOLIC BLOOD PRESSURE: 59 MMHG | HEIGHT: 67 IN | TEMPERATURE: 97.7 F | WEIGHT: 219 LBS | OXYGEN SATURATION: 95 % | RESPIRATION RATE: 18 BRPM | SYSTOLIC BLOOD PRESSURE: 112 MMHG | HEART RATE: 59 BPM

## 2018-11-05 PROCEDURE — 99215 OFFICE O/P EST HI 40 MIN: CPT | Mod: 25

## 2018-11-05 PROCEDURE — 36415 COLL VENOUS BLD VENIPUNCTURE: CPT

## 2018-11-05 RX ORDER — AZELASTINE HYDROCHLORIDE 137 UG/1
0.1 SPRAY, METERED NASAL DAILY
Qty: 1 | Refills: 2 | Status: DISCONTINUED | COMMUNITY
Start: 2018-05-24 | End: 2018-11-05

## 2018-11-05 RX ORDER — FUROSEMIDE 20 MG/1
20 TABLET ORAL
Qty: 12 | Refills: 3 | Status: DISCONTINUED | COMMUNITY
Start: 2018-01-16 | End: 2018-11-05

## 2018-11-19 ENCOUNTER — FORM ENCOUNTER (OUTPATIENT)
Age: 83
End: 2018-11-19

## 2018-11-20 ENCOUNTER — OUTPATIENT (OUTPATIENT)
Dept: OUTPATIENT SERVICES | Facility: HOSPITAL | Age: 83
LOS: 1 days | End: 2018-11-20
Payer: COMMERCIAL

## 2018-11-20 ENCOUNTER — APPOINTMENT (OUTPATIENT)
Dept: CT IMAGING | Facility: CLINIC | Age: 83
End: 2018-11-20
Payer: MEDICARE

## 2018-11-20 DIAGNOSIS — Z96.643 PRESENCE OF ARTIFICIAL HIP JOINT, BILATERAL: Chronic | ICD-10-CM

## 2018-11-20 DIAGNOSIS — H33.21 SEROUS RETINAL DETACHMENT, RIGHT EYE: Chronic | ICD-10-CM

## 2018-11-20 DIAGNOSIS — Z00.8 ENCOUNTER FOR OTHER GENERAL EXAMINATION: ICD-10-CM

## 2018-11-20 DIAGNOSIS — H26.9 UNSPECIFIED CATARACT: Chronic | ICD-10-CM

## 2018-11-20 PROCEDURE — 71250 CT THORAX DX C-: CPT | Mod: 26

## 2018-11-20 PROCEDURE — 71250 CT THORAX DX C-: CPT

## 2018-12-28 ENCOUNTER — MEDICATION RENEWAL (OUTPATIENT)
Age: 83
End: 2018-12-28

## 2019-01-04 ENCOUNTER — RX RENEWAL (OUTPATIENT)
Age: 84
End: 2019-01-04

## 2019-01-28 ENCOUNTER — APPOINTMENT (OUTPATIENT)
Dept: PULMONOLOGY | Facility: CLINIC | Age: 84
End: 2019-01-28
Payer: MEDICARE

## 2019-01-28 VITALS
HEART RATE: 57 BPM | TEMPERATURE: 97.7 F | SYSTOLIC BLOOD PRESSURE: 111 MMHG | HEIGHT: 67 IN | WEIGHT: 214 LBS | DIASTOLIC BLOOD PRESSURE: 58 MMHG | OXYGEN SATURATION: 96 % | BODY MASS INDEX: 33.59 KG/M2 | RESPIRATION RATE: 17 BRPM

## 2019-01-28 PROCEDURE — 99215 OFFICE O/P EST HI 40 MIN: CPT

## 2019-01-28 NOTE — PHYSICAL EXAM
[General Appearance - Well Developed] : well developed [Normal Conjunctiva] : the conjunctiva exhibited no abnormalities [III] : III [Heart Sounds] : normal S1 and S2 [Bowel Sounds] : normal bowel sounds [Abdomen Soft] : soft [Abnormal Walk] : normal gait [Cyanosis, Localized] : no localized cyanosis [1+ Pitting] : 1+  pitting [Skin Color & Pigmentation] : normal skin color and pigmentation [] : no rash [No Venous Stasis] : no venous stasis [Skin Lesions] : no skin lesions [No Skin Ulcers] : no skin ulcer [No Xanthoma] : no  xanthoma was observed [Deep Tendon Reflexes (DTR)] : deep tendon reflexes were 2+ and symmetric [Impaired Insight] : insight and judgment were intact [Skin Turgor] : normal skin turgor [FreeTextEntry1] : no spine tenderness

## 2019-01-28 NOTE — CONSULT LETTER
[Dear  ___] : Dear  [unfilled], [Courtesy Letter:] : I had the pleasure of seeing your patient, [unfilled], in my office today. [Sincerely,] : Sincerely, [DrKia  ___] : Dr. ORTIZ [Galilea Car MD, FCCP] : Galilea Car MD, FCCP [Director, Pulmonary Hypertension Program] : Director, Pulmonary Hypertension Program [[facility]] : [facility] [Division of Pulmonary, Critical Care and Sleep Medicine] : Division of Pulmonary, Critical Care and Sleep Medicine [Associate Professor of Medicine] : Associate Professor of Medicine

## 2019-01-28 NOTE — REVIEW OF SYSTEMS
[Dyspnea] : dyspnea [Reflux] : reflux [As Noted in HPI] : as noted in HPI [Snoring] : snoring [Fever] : no fever [Chills] : no chills [Dry Eyes] : no dryness of the eyes [Cough] : no cough [Sputum] : not coughing up ~M sputum [Chronically Infected with _____] : no chronic infections [Hypertension] : no ~T hypertension [Hypotension] : no hypotension [Chest Discomfort] : no chest discomfort [Hay Fever] : no hay fever [Itchy Eyes] : no itching of ~T the eyes [Heartburn] : no heartburn [Depression] : no depression [Diabetes] : no diabetes mellitus

## 2019-01-28 NOTE — HISTORY OF PRESENT ILLNESS
[FreeTextEntry1] : -----This letter  is regarding your patient  who  attended pulmonary out patient office today.  I have reviewed  patient's  past history, social history, family history and medication list. I also  reviewed nurse practitioners/ and fellows  notes and assessment and agree with it.  –The patient was referred by ---85 yr -year-old male history of past smoking, elevated PA S/P on echo-----History of coronary artery disease -S/P STENT PALCEMENT [ RCA  stent june 0217 ].--  --  \par \par .no history of , fever, chills , rigors, chestpain, or hemoptysis. Questionable history of Raynauds phenomenon. No h/o significant weight loss in last few months. No history of liver dysfunction , collagen vascular disorder or chronic thromboembolic disease. I would classify her dyspnea as WHO  FUNCTIONAL CLASS II--------\par \par -\par \par ECHO  --  --12/2016 severe LAE, moderate TREY EF=68%, est PASP= 48-53mmHg-------\par ---Echo  date-------JUN 2018------RVSP 45-50 mmhg, moderately elevated-----mild RA enlargement----mild to moderate AR, mild MR----\par ECHO 12/2018--- [ DR SANCHEZ]   -dilated LV LVEF 70 LVH with diastolic dysfunction moderate aortic regurgitation PS the 45, SEVER DILATED LA \par \par ---PSG ---JC  ON BIPAP 16/9\par \par ----Pft date---------7/2017--hyperinflation\par \par ----Ct scanchest 7/27/17 IMPRESSION:\par \par Mild  dilatation  of  the  main  pulmonary  artery,  which  can  be  seen  in  pulmonary\par arterial  hypertension.\par \par No  traction  bronchiectasis  or  honeycombing  to  suggest  pulmonary  fibrosis.\par \par Nonspecific  4  mm  nodule  in  the  left  lower  lobe.  Follow-up  in  one  year  with  a\par low-dose  noncontrast  CT  scan  of  the  chest  can  be  obtained  to  evaluate  for\par stability.\par \par 6  mm  nondependent  opacity  within  the  trachea  may  represent  adherent\par secretions;  attention  to  this  area  on  the  follow-up  exam  is  advised  to\par evaluate  for  resolution  and  to  exclude  an  endotracheal  lesion.\par \par \par ct 11/2018 \par IMPRESSION: \par 1. The 3 mm noncalcified nodule is unchanged since 7/27/2017. \par \par \par \par -----Cath date----------June 19 2017--stent proximal  RCA-\par \par ----SEPT 2017---------coming for follow up---------is getting whitish mucous------has been told by ENT [Dr. Gage LANDRY] that mucous is related to acid reflux-----------had a sleep study completed [AHI 75] t 90% 8.1-------\par \par ------OCT 2018--------coming for follow up-------- using biPAP daily-----gets nosebleeds----uses saline, azelastine------\par \par jan 2019 coming for follow up------STILL HAS  SLIGHT  shortness of breath on exertion-and cough  \par JC  remains compliant with biPAP----and benefits from its use--ECHO DR SANCHEZ [[facility]]

## 2019-01-28 NOTE — DISCUSSION/SUMMARY
[FreeTextEntry1] :  Assessment plan---------- patient has been referred here for further opinion regarding pulmonary problem-----------------85  -year-old male past history of smoking history of coronary disease [  RCA STENT JUNE 2017]-with secondary pulmonary hypertension\par \par 1COPD --- using symbicort and ventolin as needed. Start zpack and prednisone 10mg for cough\par \par 2-SECONDARY --pulmonary hypertension----appear secondary to his underlying LV dysfunction--[ RCA  stent june 0217,  DIASTOLIC DYSFUNCTION, AORTIC VALVE DISEASE ---i don't think there is any role for pulmonary vasodilator treatment in this situation----I did bring up the issue of right heart catheterization to better define the etiology of pulmonary hypertension----patient at this time feels that he is exercise  tolerance is acceptable-----HE  HAS  AORTIC REGURGITATION AND IS  BEING MANAGED BY DR SANCHEZ---  \par \par 3-sleep study---JC--ON BIPAP  and benefits from its use\par 4- f/u in 6months\par \par Thanks for allowing  me to participate  in the care of this patient.  Patient at this time  will follow  the above mentioned recommendations and return back for follow up visit. If you have any questions  I can be reached  at [phone]  or  [phone].  \par \par \par Galilea Car MD, FCCP \par Director, Pulmonary Hypertension Program \par [facility] \par Division of Pulmonary, Critical Care and Sleep Medicine \par  Professor of Medicine \par [employer]\par

## 2019-03-05 ENCOUNTER — TRANSCRIPTION ENCOUNTER (OUTPATIENT)
Age: 84
End: 2019-03-05

## 2019-08-01 ENCOUNTER — APPOINTMENT (OUTPATIENT)
Dept: VASCULAR SURGERY | Facility: CLINIC | Age: 84
End: 2019-08-01
Payer: MEDICARE

## 2019-08-01 VITALS
DIASTOLIC BLOOD PRESSURE: 69 MMHG | HEART RATE: 59 BPM | HEIGHT: 69 IN | SYSTOLIC BLOOD PRESSURE: 133 MMHG | TEMPERATURE: 97.9 F | WEIGHT: 215 LBS | BODY MASS INDEX: 31.84 KG/M2

## 2019-08-01 PROCEDURE — 99203 OFFICE O/P NEW LOW 30 MIN: CPT

## 2019-08-01 PROCEDURE — 93970 EXTREMITY STUDY: CPT

## 2019-08-01 RX ORDER — BUDESONIDE AND FORMOTEROL FUMARATE DIHYDRATE 80; 4.5 UG/1; UG/1
80-4.5 AEROSOL RESPIRATORY (INHALATION) TWICE DAILY
Qty: 1 | Refills: 3 | Status: COMPLETED | COMMUNITY
Start: 2018-01-16 | End: 2019-08-01

## 2019-08-01 RX ORDER — ALBUTEROL SULFATE 90 UG/1
108 (90 BASE) AEROSOL, METERED RESPIRATORY (INHALATION)
Qty: 1 | Refills: 1 | Status: DISCONTINUED | COMMUNITY
Start: 2019-01-28 | End: 2019-08-01

## 2019-08-01 RX ORDER — AZITHROMYCIN 250 MG/1
250 TABLET, FILM COATED ORAL
Qty: 1 | Refills: 0 | Status: COMPLETED | COMMUNITY
Start: 2019-01-28 | End: 2019-08-01

## 2019-08-01 RX ORDER — BUDESONIDE AND FORMOTEROL FUMARATE DIHYDRATE 80; 4.5 UG/1; UG/1
80-4.5 AEROSOL RESPIRATORY (INHALATION)
Qty: 1 | Refills: 0 | Status: COMPLETED | COMMUNITY
Start: 2018-09-01 | End: 2019-08-01

## 2019-08-01 RX ORDER — PREDNISONE 10 MG/1
10 TABLET ORAL DAILY
Qty: 10 | Refills: 1 | Status: COMPLETED | COMMUNITY
Start: 2019-01-28 | End: 2019-08-01

## 2019-08-01 RX ORDER — ALBUTEROL SULFATE 90 UG/1
108 (90 BASE) AEROSOL, METERED RESPIRATORY (INHALATION)
Qty: 1 | Refills: 5 | Status: DISCONTINUED | COMMUNITY
Start: 2018-11-05 | End: 2019-08-01

## 2019-08-01 NOTE — PROCEDURE
[FreeTextEntry1] : Patient underwent a duplex study which shows no evidence of DVT. At this time no need for intervention. Recommend elevation compression stockings

## 2019-08-01 NOTE — HISTORY OF PRESENT ILLNESS
[FreeTextEntry1] : 86-year-old gentleman presents to the office with bilateral leg swelling. Patient states that he is taking diuretics however, her did not provide much relief. Patient also with a history of varicose veins. No history of DVT. No history of trauma. He is here for evaluation

## 2019-08-01 NOTE — PHYSICAL EXAM
[JVD] : no jugular venous distention  [Normal Breath Sounds] : Normal breath sounds [Normal Rate and Rhythm] : normal rate and rhythm [2+] : left 2+ [Ankle Swelling (On Exam)] : present [Ankle Swelling Bilaterally] : bilaterally  [Ankle Swelling On The Left] : moderate [Varicose Veins Of Lower Extremities] : bilaterally [] : bilaterally [Ankle Swelling On The Right] : mild [Abdomen Tenderness] : ~T ~M No abdominal tenderness [No Rash or Lesion] : No rash or lesion [Alert] : alert [Calm] : calm [de-identified] : appears well [de-identified] : sensory and motor

## 2019-08-15 ENCOUNTER — RX RENEWAL (OUTPATIENT)
Age: 84
End: 2019-08-15

## 2019-08-23 ENCOUNTER — RX RENEWAL (OUTPATIENT)
Age: 84
End: 2019-08-23

## 2019-08-27 ENCOUNTER — MEDICATION RENEWAL (OUTPATIENT)
Age: 84
End: 2019-08-27

## 2019-09-02 ENCOUNTER — EMERGENCY (EMERGENCY)
Facility: HOSPITAL | Age: 84
LOS: 1 days | Discharge: HOME | End: 2019-09-02
Attending: EMERGENCY MEDICINE | Admitting: EMERGENCY MEDICINE
Payer: COMMERCIAL

## 2019-09-02 VITALS
SYSTOLIC BLOOD PRESSURE: 125 MMHG | DIASTOLIC BLOOD PRESSURE: 70 MMHG | RESPIRATION RATE: 14 BRPM | OXYGEN SATURATION: 99 % | TEMPERATURE: 98 F

## 2019-09-02 VITALS
HEIGHT: 70 IN | SYSTOLIC BLOOD PRESSURE: 126 MMHG | DIASTOLIC BLOOD PRESSURE: 71 MMHG | RESPIRATION RATE: 14 BRPM | HEART RATE: 62 BPM | TEMPERATURE: 98 F | WEIGHT: 214.95 LBS | OXYGEN SATURATION: 99 %

## 2019-09-02 DIAGNOSIS — H33.21 SEROUS RETINAL DETACHMENT, RIGHT EYE: Chronic | ICD-10-CM

## 2019-09-02 DIAGNOSIS — Z96.643 PRESENCE OF ARTIFICIAL HIP JOINT, BILATERAL: ICD-10-CM

## 2019-09-02 DIAGNOSIS — G62.9 POLYNEUROPATHY, UNSPECIFIED: ICD-10-CM

## 2019-09-02 DIAGNOSIS — Z96.643 PRESENCE OF ARTIFICIAL HIP JOINT, BILATERAL: Chronic | ICD-10-CM

## 2019-09-02 DIAGNOSIS — Z87.09 PERSONAL HISTORY OF OTHER DISEASES OF THE RESPIRATORY SYSTEM: ICD-10-CM

## 2019-09-02 DIAGNOSIS — H26.9 UNSPECIFIED CATARACT: Chronic | ICD-10-CM

## 2019-09-02 DIAGNOSIS — M54.2 CERVICALGIA: ICD-10-CM

## 2019-09-02 DIAGNOSIS — Z87.438 PERSONAL HISTORY OF OTHER DISEASES OF MALE GENITAL ORGANS: ICD-10-CM

## 2019-09-02 DIAGNOSIS — Z86.59 PERSONAL HISTORY OF OTHER MENTAL AND BEHAVIORAL DISORDERS: ICD-10-CM

## 2019-09-02 DIAGNOSIS — Z87.891 PERSONAL HISTORY OF NICOTINE DEPENDENCE: ICD-10-CM

## 2019-09-02 DIAGNOSIS — E78.5 HYPERLIPIDEMIA, UNSPECIFIED: ICD-10-CM

## 2019-09-02 DIAGNOSIS — Z86.79 PERSONAL HISTORY OF OTHER DISEASES OF THE CIRCULATORY SYSTEM: ICD-10-CM

## 2019-09-02 DIAGNOSIS — Z88.0 ALLERGY STATUS TO PENICILLIN: ICD-10-CM

## 2019-09-02 DIAGNOSIS — Z98.49 CATARACT EXTRACTION STATUS, UNSPECIFIED EYE: ICD-10-CM

## 2019-09-02 PROCEDURE — 72040 X-RAY EXAM NECK SPINE 2-3 VW: CPT | Mod: 26

## 2019-09-02 PROCEDURE — 72040 X-RAY EXAM NECK SPINE 2-3 VW: CPT

## 2019-09-02 PROCEDURE — 99283 EMERGENCY DEPT VISIT LOW MDM: CPT

## 2019-09-02 PROCEDURE — 99283 EMERGENCY DEPT VISIT LOW MDM: CPT | Mod: 25

## 2019-09-02 RX ORDER — CYCLOBENZAPRINE HYDROCHLORIDE 10 MG/1
1 TABLET, FILM COATED ORAL
Qty: 12 | Refills: 0
Start: 2019-09-02 | End: 2019-09-05

## 2019-09-02 RX ORDER — CYCLOBENZAPRINE HYDROCHLORIDE 10 MG/1
10 TABLET, FILM COATED ORAL ONCE
Refills: 0 | Status: COMPLETED | OUTPATIENT
Start: 2019-09-02 | End: 2019-09-02

## 2019-09-02 RX ORDER — LIDOCAINE 4 G/100G
1 CREAM TOPICAL ONCE
Refills: 0 | Status: COMPLETED | OUTPATIENT
Start: 2019-09-02 | End: 2019-09-02

## 2019-09-02 RX ADMIN — LIDOCAINE 1 PATCH: 4 CREAM TOPICAL at 11:36

## 2019-09-02 RX ADMIN — CYCLOBENZAPRINE HYDROCHLORIDE 10 MILLIGRAM(S): 10 TABLET, FILM COATED ORAL at 11:35

## 2019-09-02 NOTE — ED PROVIDER NOTE - CHPI ED SYMPTOMS NEG
no fever/no weakness/no numbness/no headache, no vision changes, no speech changes, no difficulty swallowing

## 2019-09-02 NOTE — ED PROVIDER NOTE - OBJECTIVE STATEMENT
85 y/o male with a PMHx of Anxiety, BPH, CAD, COPD, HLD, HTN, Neuropathy, presents to the ED c/o posterior neck pain x 3 days. States it started as a stiff neck but pain is worsening. States pain is more on the left side of neck. States pain is sharp when he sits up from laying down. Denies trauma, fall or injury. Denies fever, weakness, numbness, headache, vision changes, speech changes, or difficulty swallowing. Took Tramadol with no relief. Former smoker. Allergic to Penicillin. PCP: Dr. Arias in [facility]. 85 y/o male with a PMHx of Anxiety, BPH, CAD, COPD, HLD, HTN, Neuropathy, presents to the ED c/o posterior neck pain x 3 days. States he first had a stiff neck but pain is worsening. States pain is more on the left side of neck and pt feels a sharp pain when he sits up from laying down. Denies trauma, fall or injury. Denies fever, weakness, numbness, headache, vision changes, speech changes, or difficulty swallowing. Took Tramadol with some relief. Former smoker. Allergic to Penicillin. PCP: Dr. Arias in [facility].

## 2019-09-02 NOTE — ED PROVIDER NOTE - CARE PROVIDER_API CALL
Jigar Davis)  Orthopaedic Surgery  [address] [place] [address]  Phone: [phone]  Fax: [phone]  Follow Up Time: 1-3 Days

## 2019-09-02 NOTE — ED PROVIDER NOTE - PROGRESS NOTE DETAILS
Reevaluated patient at bedside.  Patient feeling improved.  Discussed the results of xray and copy of  report given.   An opportunity to ask questions was given.  Discussed the importance of prompt, close medical follow-up.  Patient will return with any changes, concerns or persistent / worsening symptoms.  Understanding of all instructions verbalized.

## 2019-09-02 NOTE — ED PROVIDER NOTE - ENMT, MLM
Airway patent, Mouth with normal mucosa. Throat has no vesicles, no oropharyngeal exudates and uvula is midline. No anterior neck tenderness or swelling.

## 2019-09-02 NOTE — ED PROVIDER NOTE - PATIENT PORTAL LINK FT
You can access the FollowMyHealth Patient Portal offered by [facility] by registering at the following website: [email or web address]. By joining [employer]’s FollowMyHealth portal, you will also be able to view your health information using other applications (apps) compatible with our system.

## 2019-09-06 NOTE — CONSULT LETTER
[Dear  ___] : Dear  [unfilled], [Courtesy Letter:] : I had the pleasure of seeing your patient, [unfilled], in my office today. [Sincerely,] : Sincerely, [DrKia  ___] : Dr. ORTIZ [Galilea Car MD, FCCP] : Galilea Car MD, FCCP [Director, Pulmonary Hypertension Program] : Director, Pulmonary Hypertension Program [[facility]] : [facility] [Division of Pulmonary, Critical Care and Sleep Medicine] : Division of Pulmonary, Critical Care and Sleep Medicine [Associate Professor of Medicine] : Associate Professor of Medicine

## 2019-09-09 ENCOUNTER — MEDICATION RENEWAL (OUTPATIENT)
Age: 84
End: 2019-09-09

## 2019-09-09 ENCOUNTER — APPOINTMENT (OUTPATIENT)
Dept: PULMONOLOGY | Facility: CLINIC | Age: 84
End: 2019-09-09
Payer: MEDICARE

## 2019-09-09 ENCOUNTER — LABORATORY RESULT (OUTPATIENT)
Age: 84
End: 2019-09-09

## 2019-09-09 VITALS
WEIGHT: 217 LBS | HEIGHT: 69 IN | BODY MASS INDEX: 32.14 KG/M2 | DIASTOLIC BLOOD PRESSURE: 73 MMHG | RESPIRATION RATE: 16 BRPM | SYSTOLIC BLOOD PRESSURE: 150 MMHG | HEART RATE: 59 BPM | TEMPERATURE: 97.4 F

## 2019-09-09 DIAGNOSIS — I27.20 PULMONARY HYPERTENSION, UNSPECIFIED: ICD-10-CM

## 2019-09-09 LAB
BASOPHILS # BLD AUTO: 0.05 K/UL
BASOPHILS NFR BLD AUTO: 0.7 %
EOSINOPHIL # BLD AUTO: 0.25 K/UL
EOSINOPHIL NFR BLD AUTO: 3.3 %
HCT VFR BLD CALC: 44.1 %
HGB BLD-MCNC: 15 G/DL
IMM GRANULOCYTES NFR BLD AUTO: 0.8 %
LYMPHOCYTES # BLD AUTO: 1.1 K/UL
LYMPHOCYTES NFR BLD AUTO: 14.5 %
MAN DIFF?: NORMAL
MCHC RBC-ENTMCNC: 30.6 PG
MCHC RBC-ENTMCNC: 34 GM/DL
MCV RBC AUTO: 90 FL
MONOCYTES # BLD AUTO: 0.63 K/UL
MONOCYTES NFR BLD AUTO: 8.3 %
NEUTROPHILS # BLD AUTO: 5.52 K/UL
NEUTROPHILS NFR BLD AUTO: 72.4 %
PLATELET # BLD AUTO: NORMAL K/UL
RBC # BLD: 4.9 M/UL
RBC # FLD: 13 %
WBC # FLD AUTO: 7.61 K/UL

## 2019-09-09 PROCEDURE — 36415 COLL VENOUS BLD VENIPUNCTURE: CPT

## 2019-09-09 PROCEDURE — 99215 OFFICE O/P EST HI 40 MIN: CPT | Mod: 25

## 2019-09-09 RX ORDER — FUROSEMIDE 40 MG/1
40 TABLET ORAL
Refills: 0 | Status: ACTIVE | COMMUNITY

## 2019-09-09 RX ORDER — HYDROCHLOROTHIAZIDE 25 MG/1
25 TABLET ORAL DAILY
Qty: 30 | Refills: 3 | Status: DISCONTINUED | COMMUNITY
Start: 2018-05-24 | End: 2019-09-09

## 2019-09-09 RX ORDER — MULTIVIT-MIN/FOLIC/VIT K/LYCOP 400-300MCG
25 MCG TABLET ORAL
Refills: 0 | Status: ACTIVE | COMMUNITY

## 2019-09-09 RX ORDER — HYDRALAZINE HYDROCHLORIDE 25 MG/1
25 TABLET ORAL
Refills: 0 | Status: DISCONTINUED | COMMUNITY
End: 2019-09-09

## 2019-09-09 NOTE — DISCUSSION/SUMMARY
[FreeTextEntry1] :  Assessment plan---------- patient has been referred here for further opinion regarding pulmonary problem-----------------86  -year-old male past history of smoking history of coronary disease [  RCA STENT JUNE 2017]-with secondary pulmonary hypertension\par \par 1 COPD --- using symbicort (decrease to 80) and ventolin as needed. repeat PFT and ct chest 1/2020\par \par 2-SECONDARY --pulmonary hypertension----appear secondary to his underlying LV dysfunction--[ RCA  stent june 0217,  DIASTOLIC DYSFUNCTION, AORTIC VALVE DISEASE ---i don't think there is any role for pulmonary vasodilator treatment in this situation----I did bring up the issue of right heart catheterization to better define the etiology of pulmonary hypertension----patient at this time feels that he is exercise  tolerance is acceptable-----HE  HAS  AORTIC REGURGITATION AND IS  BEING MANAGED BY DR SANCHEZ---  keep euvolemic- on lasix. Check labs today (creat, electrolytes)\par \par 3--JC--ON BIPAP nightly   and benefits from its use\par 4- f/u in jan 2020\par \par Thanks for allowing  me to participate  in the care of this patient.  Patient at this time  will follow  the above mentioned recommendations and return back for follow up visit. If you have any questions  I can be reached  at [phone]  or  [phone].  \par \par \par Galilea Car MD, FCCP \par Director, Pulmonary Hypertension Program \par [facility] \par Division of Pulmonary, Critical Care and Sleep Medicine \par  Professor of Medicine \par [employer]\par

## 2019-09-09 NOTE — PHYSICAL EXAM
[General Appearance - Well Developed] : well developed [III] : III [Normal Conjunctiva] : the conjunctiva exhibited no abnormalities [Heart Sounds] : normal S1 and S2 [Bowel Sounds] : normal bowel sounds [Abdomen Soft] : soft [Abnormal Walk] : normal gait [1+ Pitting] : 1+  pitting [Cyanosis, Localized] : no localized cyanosis [] : no rash [Skin Color & Pigmentation] : normal skin color and pigmentation [Skin Lesions] : no skin lesions [No Venous Stasis] : no venous stasis [No Skin Ulcers] : no skin ulcer [No Xanthoma] : no  xanthoma was observed [Deep Tendon Reflexes (DTR)] : deep tendon reflexes were 2+ and symmetric [Impaired Insight] : insight and judgment were intact [Skin Turgor] : normal skin turgor [FreeTextEntry1] : decreased entry at bases

## 2019-09-09 NOTE — REVIEW OF SYSTEMS
[Dyspnea] : dyspnea [Reflux] : reflux [As Noted in HPI] : as noted in HPI [Snoring] : snoring [Fever] : no fever [Chills] : no chills [Cough] : no cough [Dry Eyes] : no dryness of the eyes [Chronically Infected with _____] : no chronic infections [Sputum] : not coughing up ~M sputum [Hypertension] : no ~T hypertension [Hypotension] : no hypotension [Chest Discomfort] : no chest discomfort [Itchy Eyes] : no itching of ~T the eyes [Hay Fever] : no hay fever [Heartburn] : no heartburn [Diabetes] : no diabetes mellitus [Depression] : no depression

## 2019-09-09 NOTE — HISTORY OF PRESENT ILLNESS
[FreeTextEntry1] : -----This letter  is regarding your patient  who  attended pulmonary out patient office today.  I have reviewed  patient's  past history, social history, family history and medication list. I also  reviewed nurse practitioners/ and fellows  notes and assessment and agree with it.  –The patient was referred by ---\par \par 86 yr -year-old male history of past smoking, elevated PA S/P on echo-----History of coronary artery disease -S/P STENT PALCEMENT [ RCA  stent june 0217 ].--  --  \par \par .no history of , fever, chills , rigors, chestpain, or hemoptysis. Questionable history of Raynauds phenomenon. No h/o significant weight loss in last few months. No history of liver dysfunction , collagen vascular disorder or chronic thromboembolic disease. I would classify her dyspnea as WHO  FUNCTIONAL CLASS II--------\par \par -\par \par ECHO  --  --12/2016 severe LAE, moderate TREY EF=68%, est PASP= 48-53mmHg-------\par ---Echo  date-------JUN 2018------RVSP 45-50 mmhg, moderately elevated-----mild RA enlargement----mild to moderate AR, mild MR----\par ECHO 12/2018--- [ DR SANCHEZ]   -dilated LV LVEF 70 LVH with diastolic dysfunction moderate aortic regurgitation PS the 45, SEVER DILATED LA \par \par ---PSG ---JC  ON BIPAP 16/9\par \par ----Pft date---------7/2017--hyperinflation\par \par ----Ct scanchest 7/27/17 IMPRESSION:\par \par Mild  dilatation  of  the  main  pulmonary  artery,  which  can  be  seen  in  pulmonary\par arterial  hypertension.\par \par No  traction  bronchiectasis  or  honeycombing  to  suggest  pulmonary  fibrosis.\par \par Nonspecific  4  mm  nodule  in  the  left  lower  lobe.  Follow-up  in  one  year  with  a\par low-dose  noncontrast  CT  scan  of  the  chest  can  be  obtained  to  evaluate  for\par stability.\par \par 6  mm  nondependent  opacity  within  the  trachea  may  represent  adherent\par secretions;  attention  to  this  area  on  the  follow-up  exam  is  advised  to\par evaluate  for  resolution  and  to  exclude  an  endotracheal  lesion.\par \par \par ct 11/2018 \par IMPRESSION: \par 1. The 3 mm noncalcified nodule is unchanged since 7/27/2017. \par \par \par \par -----Cath date----------June 19 2017--stent proximal  RCA-\par \par ----SEPT 2017---------coming for follow up---------is getting whitish mucous------has been told by ENT [Dr. Gage LANDRY] that mucous is related to acid reflux-----------had a sleep study completed [AHI 75] t 90% 8.1-------\par \par sept 2019  \par JC  remains compliant with biPAP----and benefits from its use--\par  secondary pulm htn -recently started on lasix for edema,  follows cardiology DR SANCHEZ [[facility]]\par COPD_ using symbicort 160 bid- doing well from resp perspective

## 2019-09-10 LAB
ALBUMIN SERPL ELPH-MCNC: 4.3 G/DL
ALP BLD-CCNC: 76 U/L
ALT SERPL-CCNC: 19 U/L
ANION GAP SERPL CALC-SCNC: 18 MMOL/L
AST SERPL-CCNC: 25 U/L
BILIRUB SERPL-MCNC: 0.5 MG/DL
BUN SERPL-MCNC: 19 MG/DL
CALCIUM SERPL-MCNC: 9.5 MG/DL
CHLORIDE SERPL-SCNC: 105 MMOL/L
CO2 SERPL-SCNC: 19 MMOL/L
CREAT SERPL-MCNC: 0.95 MG/DL
FERRITIN SERPL-MCNC: 196 NG/ML
GLUCOSE SERPL-MCNC: 112 MG/DL
NT-PROBNP SERPL-MCNC: 133 PG/ML
POTASSIUM SERPL-SCNC: 4.4 MMOL/L
PROT SERPL-MCNC: 6.8 G/DL
SODIUM SERPL-SCNC: 142 MMOL/L

## 2019-09-11 ENCOUNTER — APPOINTMENT (OUTPATIENT)
Dept: UROLOGY | Facility: CLINIC | Age: 84
End: 2019-09-11
Payer: MEDICARE

## 2019-09-11 VITALS
WEIGHT: 215 LBS | OXYGEN SATURATION: 96 % | HEIGHT: 69 IN | BODY MASS INDEX: 31.84 KG/M2 | DIASTOLIC BLOOD PRESSURE: 75 MMHG | HEART RATE: 58 BPM | SYSTOLIC BLOOD PRESSURE: 162 MMHG

## 2019-09-11 PROCEDURE — 99213 OFFICE O/P EST LOW 20 MIN: CPT

## 2019-09-11 RX ORDER — TROSPIUM CHLORIDE 20 MG/1
20 TABLET, FILM COATED ORAL TWICE DAILY
Qty: 180 | Refills: 3 | Status: COMPLETED | COMMUNITY
Start: 2017-12-13 | End: 2019-09-11

## 2019-09-18 NOTE — HISTORY OF PRESENT ILLNESS
[FreeTextEntry1] : Here for one year follow up.\par LUTS:  currently on tamsulosin. \par Failed prior trospium 20 mg for overactive bladder symptoms.\par Continues to have urgency symptoms.\par No constipation, no abdominal or flank pain.\par

## 2019-09-18 NOTE — ASSESSMENT
[FreeTextEntry1] : Recommend to start Vesicare 5 mg once daily.\par Goals of medication reviewed.  Discussed the potential adverse effects of the medication.  Discussed the proper administration of the medication.\par \par Continue tamsulosin.\par \par Follow up in one year.

## 2019-09-20 ENCOUNTER — RX RENEWAL (OUTPATIENT)
Age: 84
End: 2019-09-20

## 2019-10-01 ENCOUNTER — APPOINTMENT (OUTPATIENT)
Dept: PULMONOLOGY | Facility: CLINIC | Age: 84
End: 2019-10-01
Payer: MEDICARE

## 2019-10-01 PROCEDURE — 94726 PLETHYSMOGRAPHY LUNG VOLUMES: CPT

## 2019-10-01 PROCEDURE — 94060 EVALUATION OF WHEEZING: CPT

## 2019-10-01 PROCEDURE — 94729 DIFFUSING CAPACITY: CPT

## 2019-10-21 ENCOUNTER — TRANSCRIPTION ENCOUNTER (OUTPATIENT)
Age: 84
End: 2019-10-21

## 2019-11-13 ENCOUNTER — MEDICATION RENEWAL (OUTPATIENT)
Age: 84
End: 2019-11-13

## 2019-11-13 ENCOUNTER — TRANSCRIPTION ENCOUNTER (OUTPATIENT)
Age: 84
End: 2019-11-13

## 2020-01-08 NOTE — CONSULT LETTER
[Dear  ___] : Dear  [unfilled], [Courtesy Letter:] : I had the pleasure of seeing your patient, [unfilled], in my office today. [Sincerely,] : Sincerely, [DrKia  ___] : Dr. ORTIZ [Galilea Car MD, FCCP] : Galilea Car MD, FCCP [Director, Pulmonary Hypertension Program] : Director, Pulmonary Hypertension Program [[facility]] : [facility] [Division of Pulmonary, Critical Care and Sleep Medicine] : Division of Pulmonary, Critical Care and Sleep Medicine [Associate Professor of Medicine] : Associate Professor of Medicine

## 2020-01-10 ENCOUNTER — APPOINTMENT (OUTPATIENT)
Dept: PULMONOLOGY | Facility: CLINIC | Age: 85
End: 2020-01-10
Payer: MEDICARE

## 2020-01-10 VITALS
RESPIRATION RATE: 17 BRPM | BODY MASS INDEX: 31.01 KG/M2 | SYSTOLIC BLOOD PRESSURE: 161 MMHG | HEART RATE: 64 BPM | OXYGEN SATURATION: 96 % | DIASTOLIC BLOOD PRESSURE: 68 MMHG | TEMPERATURE: 98 F | WEIGHT: 210 LBS

## 2020-01-10 DIAGNOSIS — Z00.00 ENCOUNTER FOR GENERAL ADULT MEDICAL EXAMINATION W/OUT ABNORMAL FINDINGS: ICD-10-CM

## 2020-01-10 DIAGNOSIS — R91.1 SOLITARY PULMONARY NODULE: ICD-10-CM

## 2020-01-10 PROCEDURE — 99215 OFFICE O/P EST HI 40 MIN: CPT | Mod: 25

## 2020-01-10 PROCEDURE — 36415 COLL VENOUS BLD VENIPUNCTURE: CPT

## 2020-01-10 NOTE — PHYSICAL EXAM
[General Appearance - Well Developed] : well developed [Normal Conjunctiva] : the conjunctiva exhibited no abnormalities [III] : III [Heart Sounds] : normal S1 and S2 [Bowel Sounds] : normal bowel sounds [Cyanosis, Localized] : no localized cyanosis [Abdomen Soft] : soft [Abnormal Walk] : normal gait [Skin Color & Pigmentation] : normal skin color and pigmentation [1+ Pitting] : 1+  pitting [Skin Lesions] : no skin lesions [No Venous Stasis] : no venous stasis [] : no rash [No Skin Ulcers] : no skin ulcer [No Xanthoma] : no  xanthoma was observed [Impaired Insight] : insight and judgment were intact [Deep Tendon Reflexes (DTR)] : deep tendon reflexes were 2+ and symmetric [Skin Turgor] : normal skin turgor [FreeTextEntry1] : no spine tenderness

## 2020-01-10 NOTE — HISTORY OF PRESENT ILLNESS
[FreeTextEntry1] : -----This letter  is regarding your patient  who  attended pulmonary out patient office today.  I have reviewed  patient's  past history, social history, family history and medication list. I also  reviewed nurse practitioners/ and fellows  notes and assessment and agree with it.  –The patient was referred by ------\par \par 86 yr -year-old male history of past smoking, elevated PA S/P on echo-----History of coronary artery disease -S/P STENT PALCEMENT [ RCA  stent june 0217 ].--  --  \par \par .no history of , fever, chills , rigors, chestpain, or hemoptysis. Questionable history of Raynauds phenomenon. No h/o significant weight loss in last few months. No history of liver dysfunction , collagen vascular disorder or chronic thromboembolic disease. I would classify her dyspnea as WHO  FUNCTIONAL CLASS II--------\par \par -\par \par ECHO  --  --12/2016 severe LAE, moderate TREY EF=68%, est PASP= 48-53mmHg-------\par ---Echo  date-------JUN 2018------RVSP 45-50 mmhg, moderately elevated-----mild RA enlargement----mild to moderate AR, mild MR----\par ECHO 12/2018--- [ DR SANCHEZ]   -dilated LV LVEF 70 LVH with diastolic dysfunction moderate aortic regurgitation PS the 45, SEVER DILATED LA \par \par ---PSG ---JC  ON BIPAP 16/9\par \par ----Pft date---------7/2017--hyperinflation\par \par ----Ct scanchest 7/27/17 IMPRESSION:\par \par Mild  dilatation  of  the  main  pulmonary  artery,  which  can  be  seen  in  pulmonary\par arterial  hypertension.\par \par No  traction  bronchiectasis  or  honeycombing  to  suggest  pulmonary  fibrosis.\par \par Nonspecific  4  mm  nodule  in  the  left  lower  lobe.  Follow-up  in  one  year  with  a\par low-dose  noncontrast  CT  scan  of  the  chest  can  be  obtained  to  evaluate  for\par stability.\par \par 6  mm  nondependent  opacity  within  the  trachea  may  represent  adherent\par secretions;  attention  to  this  area  on  the  follow-up  exam  is  advised  to\par evaluate  for  resolution  and  to  exclude  an  endotracheal  lesion.\par \par \par ct 11/2018 \par IMPRESSION: \par 1. The 3 mm noncalcified nodule is unchanged since 7/27/2017. \par \par -----Cath date----------June 19 2017--stent proximal  RCA-\par \par ----SEPT 2017---------coming for follow up---------is getting whitish mucous------has been told by ENT [Dr. Gage LANDRY] that mucous is related to acid reflux-----------had a sleep study completed [AHI 75] t 90% 8.1-------\par \par sept 2019  \par JC  remains compliant with biPAP----and benefits from its use--\par  secondary pulm htn -recently started on lasix for edema,  follows cardiology DR SANCHEZ [[facility]]\par COPD_ using symbicort 160 bid- doing well from resp perspective \par \par ------JAN 2020------pt stable from pulmonary point of view-----remains compliant with BiPAP-----using symbicort and ventolin----

## 2020-01-10 NOTE — REVIEW OF SYSTEMS
[Dyspnea] : dyspnea [Reflux] : reflux [As Noted in HPI] : as noted in HPI [Snoring] : snoring [Fever] : no fever [Chills] : no chills [Cough] : no cough [Dry Eyes] : no dryness of the eyes [Sputum] : not coughing up ~M sputum [Hypertension] : no ~T hypertension [Chronically Infected with _____] : no chronic infections [Hypotension] : no hypotension [Chest Discomfort] : no chest discomfort [Hay Fever] : no hay fever [Heartburn] : no heartburn [Depression] : no depression [Itchy Eyes] : no itching of ~T the eyes [Diabetes] : no diabetes mellitus

## 2020-01-10 NOTE — DISCUSSION/SUMMARY
[FreeTextEntry1] :  Assessment plan---------- patient has been referred here for further opinion regarding pulmonary problem-----------------86  -year-old male past history of smoking history of coronary disease [  RCA STENT JUNE 2017]-with secondary pulmonary hypertension\par \par 1 COPD --- using symbicort (decrease to 80) and ventolin as needed. repeat PFT and ct chest 1/2020\par \par 2-SECONDARY --pulmonary hypertension----appear secondary to his underlying LV dysfunction--[ RCA  stent june 0217,  DIASTOLIC DYSFUNCTION, AORTIC VALVE DISEASE ---i don't think there is any role for pulmonary vasodilator treatment in this situation----I did bring up the issue of right heart catheterization to better define the etiology of pulmonary hypertension----patient at this time feels that he is exercise  tolerance is acceptable-----HE  HAS  AORTIC REGURGITATION AND IS  BEING MANAGED BY DR SANCHEZ---  keep euvolemic- on lasix. Check labs today (creat, electrolytes)\par \par 3--JC--ON BIPAP nightly   and benefits from its use \par \par 4- f/u in JUL 2020\par \par ----labs done today --------Thanks for allowing  me to participate  in the care of this patient.  Patient at this time  will follow  the above mentioned recommendations and return back for follow up visit. If you have any questions  I can be reached  at [phone]  or  [phone].  \par \par \par Galilea Car MD, FCCP \par Director, Pulmonary Hypertension Program \par [facility] \par Division of Pulmonary, Critical Care and Sleep Medicine \par  Professor of Medicine \par [employer]\par

## 2020-01-13 LAB
ALBUMIN SERPL ELPH-MCNC: 4.3 G/DL
ALP BLD-CCNC: 72 U/L
ALT SERPL-CCNC: 13 U/L
ANION GAP SERPL CALC-SCNC: 10 MMOL/L
AST SERPL-CCNC: 15 U/L
BASOPHILS # BLD AUTO: 0.04 K/UL
BASOPHILS NFR BLD AUTO: 0.3 %
BILIRUB SERPL-MCNC: 0.5 MG/DL
BUN SERPL-MCNC: 23 MG/DL
CALCIUM SERPL-MCNC: 9.4 MG/DL
CHLORIDE SERPL-SCNC: 103 MMOL/L
CO2 SERPL-SCNC: 27 MMOL/L
CREAT SERPL-MCNC: 0.95 MG/DL
EOSINOPHIL # BLD AUTO: 0.04 K/UL
EOSINOPHIL NFR BLD AUTO: 0.3 %
GLUCOSE SERPL-MCNC: 111 MG/DL
HCT VFR BLD CALC: 42.6 %
HGB BLD-MCNC: 14.1 G/DL
IMM GRANULOCYTES NFR BLD AUTO: 0.6 %
LYMPHOCYTES # BLD AUTO: 1.23 K/UL
LYMPHOCYTES NFR BLD AUTO: 8.5 %
MAN DIFF?: NORMAL
MCHC RBC-ENTMCNC: 29.4 PG
MCHC RBC-ENTMCNC: 33.1 GM/DL
MCV RBC AUTO: 88.8 FL
MONOCYTES # BLD AUTO: 1.08 K/UL
MONOCYTES NFR BLD AUTO: 7.5 %
NEUTROPHILS # BLD AUTO: 11.96 K/UL
NEUTROPHILS NFR BLD AUTO: 82.8 %
NT-PROBNP SERPL-MCNC: 277 PG/ML
PLATELET # BLD AUTO: 234 K/UL
POTASSIUM SERPL-SCNC: 4.1 MMOL/L
PROT SERPL-MCNC: 6.3 G/DL
RBC # BLD: 4.8 M/UL
RBC # FLD: 13.2 %
SODIUM SERPL-SCNC: 140 MMOL/L
TSH SERPL-ACNC: 0.95 UIU/ML
WBC # FLD AUTO: 14.43 K/UL

## 2020-01-23 ENCOUNTER — OUTPATIENT (OUTPATIENT)
Dept: OUTPATIENT SERVICES | Facility: HOSPITAL | Age: 85
LOS: 1 days | End: 2020-01-23
Payer: COMMERCIAL

## 2020-01-23 ENCOUNTER — APPOINTMENT (OUTPATIENT)
Dept: CT IMAGING | Facility: CLINIC | Age: 85
End: 2020-01-23
Payer: MEDICARE

## 2020-01-23 DIAGNOSIS — R06.09 OTHER FORMS OF DYSPNEA: ICD-10-CM

## 2020-01-23 DIAGNOSIS — H33.21 SEROUS RETINAL DETACHMENT, RIGHT EYE: Chronic | ICD-10-CM

## 2020-01-23 DIAGNOSIS — Z96.643 PRESENCE OF ARTIFICIAL HIP JOINT, BILATERAL: Chronic | ICD-10-CM

## 2020-01-23 DIAGNOSIS — H26.9 UNSPECIFIED CATARACT: Chronic | ICD-10-CM

## 2020-01-23 PROCEDURE — 71250 CT THORAX DX C-: CPT | Mod: 26

## 2020-01-23 PROCEDURE — 71250 CT THORAX DX C-: CPT

## 2020-02-05 ENCOUNTER — APPOINTMENT (OUTPATIENT)
Dept: UROLOGY | Facility: CLINIC | Age: 85
End: 2020-02-05
Payer: MEDICARE

## 2020-02-05 ENCOUNTER — APPOINTMENT (OUTPATIENT)
Dept: UROLOGY | Facility: CLINIC | Age: 85
End: 2020-02-05

## 2020-02-05 PROCEDURE — 99213 OFFICE O/P EST LOW 20 MIN: CPT

## 2020-02-10 NOTE — HISTORY OF PRESENT ILLNESS
[FreeTextEntry1] : Last visit Sept 2019.\par BPH: currently on tamsulosin and finasteride.\par Has concomitant overactive bladder symptoms.  Failed oxybutynin, trospium.  Last visit, started on Vesicare but does not feel that medication is effective.\par Continues to have urgency with urge incontinence.\par \par No hematuria, dysuria. Urine stream is okay.  \par No abdominal or flank pain.

## 2020-02-10 NOTE — ASSESSMENT
[FreeTextEntry1] : Continue finasteride and tamsulosin.\par Start mirabegron 25 mg once daily.\par Goals of medication reviewed.  Discussed the potential adverse effects of the medication.  Discussed the proper administration of the medication.\par Patient will call in one month to report on efficacy of medication.

## 2020-02-19 ENCOUNTER — NON-APPOINTMENT (OUTPATIENT)
Age: 85
End: 2020-02-19

## 2020-02-20 ENCOUNTER — TRANSCRIPTION ENCOUNTER (OUTPATIENT)
Age: 85
End: 2020-02-20

## 2020-02-24 RX ORDER — BUDESONIDE AND FORMOTEROL FUMARATE DIHYDRATE 80; 4.5 UG/1; UG/1
80-4.5 AEROSOL RESPIRATORY (INHALATION) TWICE DAILY
Qty: 1 | Refills: 2 | Status: DISCONTINUED | COMMUNITY
Start: 2019-01-28 | End: 2020-02-24

## 2020-02-27 ENCOUNTER — TRANSCRIPTION ENCOUNTER (OUTPATIENT)
Age: 85
End: 2020-02-27

## 2020-06-26 ENCOUNTER — EMERGENCY (EMERGENCY)
Facility: HOSPITAL | Age: 85
LOS: 1 days | Discharge: ANOTHER HOSPITAL | End: 2020-06-26
Attending: EMERGENCY MEDICINE | Admitting: EMERGENCY MEDICINE
Payer: COMMERCIAL

## 2020-06-26 ENCOUNTER — INPATIENT (INPATIENT)
Facility: HOSPITAL | Age: 85
LOS: 9 days | Discharge: HOME | DRG: 357 | End: 2020-07-06
Attending: HOSPITALIST | Admitting: HOSPITALIST
Payer: COMMERCIAL

## 2020-06-26 VITALS
DIASTOLIC BLOOD PRESSURE: 80 MMHG | OXYGEN SATURATION: 98 % | HEIGHT: 71 IN | TEMPERATURE: 99 F | WEIGHT: 210.1 LBS | RESPIRATION RATE: 18 BRPM | SYSTOLIC BLOOD PRESSURE: 153 MMHG | HEART RATE: 70 BPM

## 2020-06-26 VITALS
HEART RATE: 78 BPM | SYSTOLIC BLOOD PRESSURE: 122 MMHG | HEIGHT: 71 IN | DIASTOLIC BLOOD PRESSURE: 69 MMHG | OXYGEN SATURATION: 96 % | WEIGHT: 207.01 LBS | RESPIRATION RATE: 16 BRPM | TEMPERATURE: 99 F

## 2020-06-26 VITALS
DIASTOLIC BLOOD PRESSURE: 79 MMHG | RESPIRATION RATE: 18 BRPM | HEART RATE: 66 BPM | TEMPERATURE: 99 F | SYSTOLIC BLOOD PRESSURE: 169 MMHG | OXYGEN SATURATION: 97 %

## 2020-06-26 DIAGNOSIS — N40.0 BENIGN PROSTATIC HYPERPLASIA WITHOUT LOWER URINARY TRACT SYMPTOMS: ICD-10-CM

## 2020-06-26 DIAGNOSIS — Z96.643 PRESENCE OF ARTIFICIAL HIP JOINT, BILATERAL: Chronic | ICD-10-CM

## 2020-06-26 DIAGNOSIS — E78.5 HYPERLIPIDEMIA, UNSPECIFIED: ICD-10-CM

## 2020-06-26 DIAGNOSIS — K56.609 UNSPECIFIED INTESTINAL OBSTRUCTION, UNSPECIFIED AS TO PARTIAL VERSUS COMPLETE OBSTRUCTION: ICD-10-CM

## 2020-06-26 DIAGNOSIS — H33.21 SEROUS RETINAL DETACHMENT, RIGHT EYE: Chronic | ICD-10-CM

## 2020-06-26 DIAGNOSIS — I10 ESSENTIAL (PRIMARY) HYPERTENSION: ICD-10-CM

## 2020-06-26 DIAGNOSIS — H26.9 UNSPECIFIED CATARACT: Chronic | ICD-10-CM

## 2020-06-26 DIAGNOSIS — J44.9 CHRONIC OBSTRUCTIVE PULMONARY DISEASE, UNSPECIFIED: ICD-10-CM

## 2020-06-26 DIAGNOSIS — I25.10 ATHEROSCLEROTIC HEART DISEASE OF NATIVE CORONARY ARTERY WITHOUT ANGINA PECTORIS: ICD-10-CM

## 2020-06-26 DIAGNOSIS — Z29.9 ENCOUNTER FOR PROPHYLACTIC MEASURES, UNSPECIFIED: ICD-10-CM

## 2020-06-26 LAB
ALBUMIN SERPL ELPH-MCNC: 3.7 G/DL — SIGNIFICANT CHANGE UP (ref 3.3–5)
ALP SERPL-CCNC: 76 U/L — SIGNIFICANT CHANGE UP (ref 30–120)
ALT FLD-CCNC: 20 U/L DA — SIGNIFICANT CHANGE UP (ref 10–60)
ANION GAP SERPL CALC-SCNC: 10 MMOL/L — SIGNIFICANT CHANGE UP (ref 5–17)
APTT BLD: 27 SEC — LOW (ref 28.5–37)
AST SERPL-CCNC: 16 U/L — SIGNIFICANT CHANGE UP (ref 10–40)
BASOPHILS # BLD AUTO: 0.02 K/UL — SIGNIFICANT CHANGE UP (ref 0–0.2)
BASOPHILS NFR BLD AUTO: 0.1 % — SIGNIFICANT CHANGE UP (ref 0–2)
BILIRUB SERPL-MCNC: 1.4 MG/DL — HIGH (ref 0.2–1.2)
BUN SERPL-MCNC: 29 MG/DL — HIGH (ref 7–23)
CALCIUM SERPL-MCNC: 9 MG/DL — SIGNIFICANT CHANGE UP (ref 8.4–10.5)
CHLORIDE SERPL-SCNC: 101 MMOL/L — SIGNIFICANT CHANGE UP (ref 96–108)
CO2 SERPL-SCNC: 27 MMOL/L — SIGNIFICANT CHANGE UP (ref 22–31)
CREAT SERPL-MCNC: 1.22 MG/DL — SIGNIFICANT CHANGE UP (ref 0.5–1.3)
EOSINOPHIL # BLD AUTO: 0 K/UL — SIGNIFICANT CHANGE UP (ref 0–0.5)
EOSINOPHIL NFR BLD AUTO: 0 % — SIGNIFICANT CHANGE UP (ref 0–6)
GLUCOSE SERPL-MCNC: 145 MG/DL — HIGH (ref 70–99)
HCT VFR BLD CALC: 45.6 % — SIGNIFICANT CHANGE UP (ref 39–50)
HGB BLD-MCNC: 15.6 G/DL — SIGNIFICANT CHANGE UP (ref 13–17)
IMM GRANULOCYTES NFR BLD AUTO: 0.4 % — SIGNIFICANT CHANGE UP (ref 0–1.5)
INR BLD: 1.21 RATIO — HIGH (ref 0.88–1.16)
LIDOCAIN IGE QN: 84 U/L — SIGNIFICANT CHANGE UP (ref 73–393)
LYMPHOCYTES # BLD AUTO: 0.63 K/UL — LOW (ref 1–3.3)
LYMPHOCYTES # BLD AUTO: 4.6 % — LOW (ref 13–44)
MCHC RBC-ENTMCNC: 30.6 PG — SIGNIFICANT CHANGE UP (ref 27–34)
MCHC RBC-ENTMCNC: 34.2 GM/DL — SIGNIFICANT CHANGE UP (ref 32–36)
MCV RBC AUTO: 89.4 FL — SIGNIFICANT CHANGE UP (ref 80–100)
MONOCYTES # BLD AUTO: 0.83 K/UL — SIGNIFICANT CHANGE UP (ref 0–0.9)
MONOCYTES NFR BLD AUTO: 6 % — SIGNIFICANT CHANGE UP (ref 2–14)
NEUTROPHILS # BLD AUTO: 12.27 K/UL — HIGH (ref 1.8–7.4)
NEUTROPHILS NFR BLD AUTO: 88.9 % — HIGH (ref 43–77)
NRBC # BLD: 0 /100 WBCS — SIGNIFICANT CHANGE UP (ref 0–0)
PLATELET # BLD AUTO: 212 K/UL — SIGNIFICANT CHANGE UP (ref 150–400)
POTASSIUM SERPL-MCNC: 4 MMOL/L — SIGNIFICANT CHANGE UP (ref 3.5–5.3)
POTASSIUM SERPL-SCNC: 4 MMOL/L — SIGNIFICANT CHANGE UP (ref 3.5–5.3)
PROT SERPL-MCNC: 7.5 G/DL — SIGNIFICANT CHANGE UP (ref 6–8.3)
PROTHROM AB SERPL-ACNC: 13.5 SEC — HIGH (ref 10–12.9)
RBC # BLD: 5.1 M/UL — SIGNIFICANT CHANGE UP (ref 4.2–5.8)
RBC # FLD: 13 % — SIGNIFICANT CHANGE UP (ref 10.3–14.5)
SARS-COV-2 RNA SPEC QL NAA+PROBE: SIGNIFICANT CHANGE UP
SODIUM SERPL-SCNC: 138 MMOL/L — SIGNIFICANT CHANGE UP (ref 135–145)
TROPONIN I SERPL-MCNC: 0 NG/ML — LOW (ref 0.02–0.06)
WBC # BLD: 13.81 K/UL — HIGH (ref 3.8–10.5)
WBC # FLD AUTO: 13.81 K/UL — HIGH (ref 3.8–10.5)

## 2020-06-26 PROCEDURE — 74177 CT ABD & PELVIS W/CONTRAST: CPT

## 2020-06-26 PROCEDURE — 99285 EMERGENCY DEPT VISIT HI MDM: CPT | Mod: 25

## 2020-06-26 PROCEDURE — 96375 TX/PRO/DX INJ NEW DRUG ADDON: CPT | Mod: XU

## 2020-06-26 PROCEDURE — 93005 ELECTROCARDIOGRAM TRACING: CPT

## 2020-06-26 PROCEDURE — 80053 COMPREHEN METABOLIC PANEL: CPT

## 2020-06-26 PROCEDURE — 85730 THROMBOPLASTIN TIME PARTIAL: CPT

## 2020-06-26 PROCEDURE — 36415 COLL VENOUS BLD VENIPUNCTURE: CPT

## 2020-06-26 PROCEDURE — 99285 EMERGENCY DEPT VISIT HI MDM: CPT

## 2020-06-26 PROCEDURE — 85610 PROTHROMBIN TIME: CPT

## 2020-06-26 PROCEDURE — 96374 THER/PROPH/DIAG INJ IV PUSH: CPT | Mod: XU

## 2020-06-26 PROCEDURE — 85027 COMPLETE CBC AUTOMATED: CPT

## 2020-06-26 PROCEDURE — 99223 1ST HOSP IP/OBS HIGH 75: CPT | Mod: GC

## 2020-06-26 PROCEDURE — 84484 ASSAY OF TROPONIN QUANT: CPT

## 2020-06-26 PROCEDURE — 71045 X-RAY EXAM CHEST 1 VIEW: CPT | Mod: 26

## 2020-06-26 PROCEDURE — 74177 CT ABD & PELVIS W/CONTRAST: CPT | Mod: 26

## 2020-06-26 PROCEDURE — 96361 HYDRATE IV INFUSION ADD-ON: CPT | Mod: XU

## 2020-06-26 PROCEDURE — 83690 ASSAY OF LIPASE: CPT

## 2020-06-26 PROCEDURE — 99282 EMERGENCY DEPT VISIT SF MDM: CPT | Mod: 25

## 2020-06-26 PROCEDURE — 99221 1ST HOSP IP/OBS SF/LOW 40: CPT

## 2020-06-26 PROCEDURE — 93010 ELECTROCARDIOGRAM REPORT: CPT

## 2020-06-26 RX ORDER — ENOXAPARIN SODIUM 100 MG/ML
40 INJECTION SUBCUTANEOUS DAILY
Refills: 0 | Status: DISCONTINUED | OUTPATIENT
Start: 2020-06-26 | End: 2020-06-30

## 2020-06-26 RX ORDER — METOPROLOL TARTRATE 50 MG
1 TABLET ORAL
Qty: 0 | Refills: 0 | DISCHARGE

## 2020-06-26 RX ORDER — ONDANSETRON 8 MG/1
4 TABLET, FILM COATED ORAL ONCE
Refills: 0 | Status: COMPLETED | OUTPATIENT
Start: 2020-06-26 | End: 2020-06-26

## 2020-06-26 RX ORDER — TAMSULOSIN HYDROCHLORIDE 0.4 MG/1
0 CAPSULE ORAL
Qty: 0 | Refills: 0 | DISCHARGE

## 2020-06-26 RX ORDER — CHOLECALCIFEROL (VITAMIN D3) 125 MCG
1 CAPSULE ORAL
Qty: 0 | Refills: 0 | DISCHARGE

## 2020-06-26 RX ORDER — ASPIRIN/CALCIUM CARB/MAGNESIUM 324 MG
325 TABLET ORAL AT BEDTIME
Refills: 0 | Status: DISCONTINUED | OUTPATIENT
Start: 2020-06-26 | End: 2020-06-30

## 2020-06-26 RX ORDER — FINASTERIDE 5 MG/1
5 TABLET, FILM COATED ORAL DAILY
Refills: 0 | Status: DISCONTINUED | OUTPATIENT
Start: 2020-06-26 | End: 2020-07-01

## 2020-06-26 RX ORDER — LOSARTAN POTASSIUM 100 MG/1
100 TABLET, FILM COATED ORAL DAILY
Refills: 0 | Status: DISCONTINUED | OUTPATIENT
Start: 2020-06-26 | End: 2020-07-01

## 2020-06-26 RX ORDER — AMLODIPINE BESYLATE 2.5 MG/1
10 TABLET ORAL DAILY
Refills: 0 | Status: DISCONTINUED | OUTPATIENT
Start: 2020-06-26 | End: 2020-07-01

## 2020-06-26 RX ORDER — RANITIDINE HYDROCHLORIDE 150 MG/1
1 TABLET, FILM COATED ORAL
Qty: 0 | Refills: 0 | DISCHARGE

## 2020-06-26 RX ORDER — TAMSULOSIN HYDROCHLORIDE 0.4 MG/1
0.4 CAPSULE ORAL AT BEDTIME
Refills: 0 | Status: DISCONTINUED | OUTPATIENT
Start: 2020-06-26 | End: 2020-07-01

## 2020-06-26 RX ORDER — FOSINOPRIL SODIUM 10 MG/1
1 TABLET ORAL
Qty: 0 | Refills: 0 | DISCHARGE

## 2020-06-26 RX ORDER — FAMOTIDINE 10 MG/ML
20 INJECTION INTRAVENOUS EVERY 12 HOURS
Refills: 0 | Status: DISCONTINUED | OUTPATIENT
Start: 2020-06-26 | End: 2020-07-01

## 2020-06-26 RX ORDER — FUROSEMIDE 40 MG
40 TABLET ORAL DAILY
Refills: 0 | Status: DISCONTINUED | OUTPATIENT
Start: 2020-06-26 | End: 2020-06-27

## 2020-06-26 RX ORDER — ONDANSETRON 8 MG/1
4 TABLET, FILM COATED ORAL EVERY 6 HOURS
Refills: 0 | Status: DISCONTINUED | OUTPATIENT
Start: 2020-06-26 | End: 2020-07-01

## 2020-06-26 RX ORDER — SODIUM CHLORIDE 9 MG/ML
1000 INJECTION INTRAMUSCULAR; INTRAVENOUS; SUBCUTANEOUS
Refills: 0 | Status: DISCONTINUED | OUTPATIENT
Start: 2020-06-26 | End: 2020-06-27

## 2020-06-26 RX ORDER — HYDRALAZINE HCL 50 MG
0 TABLET ORAL
Qty: 0 | Refills: 0 | DISCHARGE

## 2020-06-26 RX ORDER — METOPROLOL TARTRATE 50 MG
25 TABLET ORAL
Refills: 0 | Status: DISCONTINUED | OUTPATIENT
Start: 2020-06-26 | End: 2020-07-01

## 2020-06-26 RX ORDER — MIRABEGRON 50 MG/1
1 TABLET, EXTENDED RELEASE ORAL
Qty: 0 | Refills: 0 | DISCHARGE

## 2020-06-26 RX ORDER — FAMOTIDINE 10 MG/ML
20 INJECTION INTRAVENOUS ONCE
Refills: 0 | Status: COMPLETED | OUTPATIENT
Start: 2020-06-26 | End: 2020-06-26

## 2020-06-26 RX ORDER — SODIUM CHLORIDE 9 MG/ML
1000 INJECTION, SOLUTION INTRAVENOUS
Refills: 0 | Status: DISCONTINUED | OUTPATIENT
Start: 2020-06-26 | End: 2020-06-26

## 2020-06-26 RX ORDER — SODIUM CHLORIDE 9 MG/ML
1000 INJECTION INTRAMUSCULAR; INTRAVENOUS; SUBCUTANEOUS ONCE
Refills: 0 | Status: COMPLETED | OUTPATIENT
Start: 2020-06-26 | End: 2020-06-26

## 2020-06-26 RX ORDER — ATORVASTATIN CALCIUM 80 MG/1
10 TABLET, FILM COATED ORAL AT BEDTIME
Refills: 0 | Status: DISCONTINUED | OUTPATIENT
Start: 2020-06-26 | End: 2020-07-01

## 2020-06-26 RX ORDER — POTASSIUM CHLORIDE 20 MEQ
1 PACKET (EA) ORAL
Qty: 0 | Refills: 0 | DISCHARGE

## 2020-06-26 RX ADMIN — SODIUM CHLORIDE 1000 MILLILITER(S): 9 INJECTION INTRAMUSCULAR; INTRAVENOUS; SUBCUTANEOUS at 16:15

## 2020-06-26 RX ADMIN — SODIUM CHLORIDE 50 MILLILITER(S): 9 INJECTION INTRAMUSCULAR; INTRAVENOUS; SUBCUTANEOUS at 23:58

## 2020-06-26 RX ADMIN — ONDANSETRON 4 MILLIGRAM(S): 8 TABLET, FILM COATED ORAL at 14:16

## 2020-06-26 RX ADMIN — SODIUM CHLORIDE 1000 MILLILITER(S): 9 INJECTION INTRAMUSCULAR; INTRAVENOUS; SUBCUTANEOUS at 14:16

## 2020-06-26 RX ADMIN — FAMOTIDINE 20 MILLIGRAM(S): 10 INJECTION INTRAVENOUS at 14:16

## 2020-06-26 NOTE — ED ADULT NURSE NOTE - CHIEF COMPLAINT QUOTE
" I have nausea/ vomiting/ diarrhea, abdominal pain x 3 days since after I had a stress test and was given a dye "

## 2020-06-26 NOTE — ED PROVIDER NOTE - PROGRESS NOTE DETAILS
PA is currently in the OR with Dr. Simpson Significant delay in surgery consult since surgery team is in the OR and has multiple cases today. Given pts age and history, attempted to admit to medicine since unlikely surgery would accept case as primary admitting team. Dr. Villagomez requesting surgery evaluation prior to accepting patient

## 2020-06-26 NOTE — H&P ADULT - PROBLEM SELECTOR PLAN 4
Chronic, stable  -continue home losartan and metoprolol with hold parameters  -monitor routine hemodynamics

## 2020-06-26 NOTE — H&P ADULT - NSICDXPASTMEDICALHX_GEN_ALL_CORE_FT
PAST MEDICAL HISTORY:  Anxiety     BPH (Benign Prostatic Hyperplasia)     CAD (coronary artery disease)     COPD (chronic obstructive pulmonary disease) mild (no home oxygen)    Hyperlipemia     Hypertension     Pulmonary hypertension moderate

## 2020-06-26 NOTE — H&P ADULT - PROBLEM SELECTOR PLAN 3
on bipap at night, not on day time O2  -will continue bipap at night  -duonebs prn for SOB and wheezing

## 2020-06-26 NOTE — H&P ADULT - PROBLEM SELECTOR PLAN 2
nuclear stress on 6/24 - unchanged from previous as per patient  -EKG: NSR with LAFB  -troponins x1 - negative, f/u repeat   -chest pain described by patient more likely epigastric  -continue home aspirin 325mg daily and statin

## 2020-06-26 NOTE — H&P ADULT - NSHPREVIEWOFSYSTEMS_GEN_ALL_CORE
Constitutional: denies fever, chills, sweating  HEENT: denies headache, dizziness, or lightheadedness  Respiratory: denies SOB, cough, or wheezing  Cardiovascular: denies CP, palpitations  Gastrointestinal: admits abdominal pain, nausea, vomiting, diarrhea, denies bloody stools  Genitourinary: denies painful urination, increased frequency, urgency, or bloody urine  Skin/Breast: denies rashes or itching  Musculoskeletal: denies muscle aches, joint swelling, or muscle weakness  Neurologic: denies loss of sensation, numbness, or tingling  ROS negative except as noted above

## 2020-06-26 NOTE — ED PROVIDER NOTE - OBJECTIVE STATEMENT
85 yo M PMHx of CAD, COPD, HTN, HLD presents to the ED as a transfer from [place] for admission, dx: SBO. Pt reports epigastric burning sensation and decreased appetite x 2 days. States symptoms progressed to N/V last night. Reports at least 6 episodes of vomiting "brown material" today. Pt also reports watery stools since yesterday. Pt was medicated at [place] and states symptoms are now improved.  PMD- [place]. Cardio- [place]

## 2020-06-26 NOTE — ED PROVIDER NOTE - PROGRESS NOTE DETAILS
d/w wes (surg) requests xfer to [facility], d/w zac ([place] [facility]) will accept xfer er->er

## 2020-06-26 NOTE — H&P ADULT - PROBLEM SELECTOR PLAN 7
DVT prophylaxis: Lovenox 40mg daily    IMPROVE VTE Individual Risk Assessment          RISK                                                          Points  [  ] Previous VTE                                                3  [  ] Thrombophilia                                             2  [  ] Lower limb paralysis                                   2        (unable to hold up >15 seconds)    [  ] Current Cancer                                             2         (within 6 months)  [  ] Immobilization > 24 hrs                              1  [  ] ICU/CCU stay > 24 hours                             1  [ x ] Age > 60                                                         1    IMPROVE VTE Score: 1

## 2020-06-26 NOTE — H&P ADULT - HISTORY OF PRESENT ILLNESS
85 yo M PMHx of CAD, COPD, HTN, HLD h/o MI, CAD s/p stents RCA, LAD, HTN, HLD, BPH, anxiety, bilat LE neuropathy, mild-mod AI, mild COPD  presents to the ED as a transfer from [place] for admission, dx: SBO. Pt reports epigastric burning sensation and decreased appetite x 2 days. States symptoms progressed to N/V last night. Reports at least 6 episodes of vomiting "brown material" today. Pt also reports watery stools since yesterday. Pt was medicated at [place] and states symptoms are now improved.    IN THE ED:  Temp 98.7  F , HR 70 , BP  153/80  ,RR 18 , SpO2 98% on RA  S/P pepcid and zofran, 1L IVF  EKG: NSR, LAFB  Labs significant for WBC 13.81, BUN 29, Glucose 145, bilirubin 1.4, toponin .000  CT A/P: IMPRESSION: Distal small bowel obstruction. Mild interloop edema/fluid. 85 yo M PMHx of CAD s/p MI and stents to RCA and LAD, COPD (on bipap at night), HTN, HLD, BPH presents to the ED with 2 days of abdominal pain and N/V. Patient states that for the past 2 days he has been having epigastric burning with some lower abdominal pain. Has not had anything PO in the past two days due to nausea and vomiting. States that had 6-7 episodes of emesis today. States that it is brown in color. Also admits to some loose stools since yesterday. States that his symptoms have improved following the pepcid and zofran. Admits to some chest discomfort and pain in his shoulders b/l. Admits to some mild shortness of breath, which is chronic. Had a nuclear stress on 6/24 which was unchanged from his previous test. Denies fever, chills, headache, dizziness, cough.    IN THE ED:  Temp 98.7  F , HR 70 , BP  153/80  ,RR 18 , SpO2 98% on RA  S/P pepcid and zofran, 1L IVF  EKG: NSR, LAFB  Labs significant for WBC 13.81, BUN 29, Glucose 145, bilirubin 1.4, toponin .000  CT A/P: IMPRESSION: Distal small bowel obstruction. Mild interloop edema/fluid. 85 yo M PMHx of CAD s/p MI and stents to RCA and LAD, COPD (on bipap at night), HTN, HLD, BPH presents to the ED with 2 days of abdominal pain and N/V. Patient states that for the past 2 days he has been having epigastric burning with some lower abdominal pain. Has not had anything PO in the past two days due to nausea and vomiting. States that had 6-7 episodes of emesis today. States that it is brown in color. Also admits to some loose stools since yesterday. States that his symptoms have improved following the pepcid and zofran. Admits to some chest discomfort and pain in his shoulders b/l. Admits to some mild shortness of breath, which is chronic. Had a nuclear stress on 6/24 which was unchanged from his previous test. Denies fever, chills, headache, dizziness, cough.    IN THE ED:  Temp 98.7  F , HR 70 , BP  153/80  ,RR 18 , SpO2 98% on RA  S/P pepcid and zofran, 1L IVF  EKG: NSR, LAFB  Labs significant for WBC 13.81, BUN 29, Glucose 145, bilirubin 1.4, toponin .000  CT A/P: IMPRESSION: Distal small bowel obstruction. Mild interloop edema/fluid.    Surgical team saw pt in the ED, did not recommend NG tube or any other intervention for now.

## 2020-06-26 NOTE — ED ADULT NURSE NOTE - OBJECTIVE STATEMENT
Abd pain.  Apparent SBO Pt. received alert and oriented x3 transferred from [facility] for SBO. Pt. states he has had multiple episodes of vomiting and diarrhea for last week. Pt. presents w/ distended abdomen, denies pain. Pt. presents w/ stage 1 pressure ulcer to sacral area.

## 2020-06-26 NOTE — H&P ADULT - NSICDXPASTSURGICALHX_GEN_ALL_CORE_FT
PAST SURGICAL HISTORY:  Bilateral cataracts removed    Detached retina, right with repair    H/O bilateral hip replacements

## 2020-06-26 NOTE — H&P ADULT - PROBLEM SELECTOR PLAN 1
CT A/P: IMPRESSION: Distal small bowel obstruction. Mild interloop edema/fluid.  -admit to [facility] with remote tele  -as per surgery - no surgical intervention at this time, conservative management  -s/p 1 L IVF - gentle IVF hydratation 50cc/hr  -continue IV pepcid 20mg BID  -zofran prn for nausea  -NPO except meds  -WBC of 13.81 - afebrile, unlikely infecitous, continue to trend  -Surgery, Dr. Simpson consulted by ED  -GI, Dr. Brady consulted

## 2020-06-26 NOTE — ED PROVIDER NOTE - ATTENDING CONTRIBUTION TO CARE
Pt is a 87 yo male who presents to the ED with a cc of abd pain N/V/D.  PMHx of Anxiety, BPH, CAD, COPD, HLD, HTN, h/o pulmonary HTN.   Pt reports that on Wednesday he underwent a chemical stress test.  He reports no issues with the procedure itself but he did note that he was nausea.  Pt reports initially he thought it was related to the stress test itself and so he spoke with his cardiologist who told him to monitor his symptoms.  Pt then reports that he developed vomiting with diarrhea.  He states that he is having several episodes of loose stool a day with several episodes of vomiting.  He has been unable to tolerate po for the last 3 days.  He also reports associated epigastric burning.  He presented to  where he underwent labs and CT imaging.  Pt was found to have a distal SBO with mild interloop edema.  He received 1 liter of NS with Pepcid and Zofran.  Pt was sent to the [facility] for admission.  He denies prior abdominal surgery.  On exam pt lying in bed NAD, NCAT, heart RRR with murmur, lungs CTA, abd distended with diffuse TTP and hypoactive to no BS noted.  Pt presenting to the ED for admission for SBO, labs, EKG, CT results reviewed from .  Will admit, consult surgery and monitor.  Pt not actively vomiting at this time.  COVID sent

## 2020-06-26 NOTE — ED PROVIDER NOTE - OBJECTIVE STATEMENT
86 year old male with a PMHx of CAD, COPD, HTN, HLD presents to the ED c/o n/v, diffuse burning abd pain and loose stools since stress test on 6/24 at [place]. Symptoms are constant. Pt declining pain medications. former smoker. PMD- [place]. Cardio- [place] 86 year old male with a PMHx of CAD, COPD, HTN, HLD presents to the ED c/o n/v, diffuse burning abd pain and loose stools since stress test on 6/24 at [place]. Symptoms are constant. No fevers, chills, cp, sob, cough. Pt declining pain medications. former smoker. PMD- [place]. Cardio- [place]

## 2020-06-26 NOTE — H&P ADULT - NSHPPHYSICALEXAM_GEN_ALL_CORE
T(C): 36.8 (06-26-20 @ 21:49), Max: 37.1 (06-26-20 @ 18:35)  HR: 75 (06-26-20 @ 21:49) (66 - 78)  BP: 148/71 (06-26-20 @ 21:49) (122/69 - 169/79)  RR: 19 (06-26-20 @ 21:49) (16 - 19)  SpO2: 97% (06-26-20 @ 21:49) (95% - 98%)    Physical Exam:  Gen: well appearing, NAD  HEENT: NCAT, PEERLA b/l, EOMI b/l, no conjunctival erythema  Cardio: regular rate and rhythm, +s1s2, no murmurs, rubs, or gallops  Pulm: CTA b/l, no wheezes, rales or rhonchi  Abdomen: soft, distended, mildly tender to palpation diffusely, no BS x4 quadrants, no guarding  Extremities: no clubbing, cyanosis or edema, +2 pedal pulses  Neuro: AAOx3, muscle strength grossly intact  Skin: warm and dry

## 2020-06-26 NOTE — ED PROVIDER NOTE - GASTROINTESTINAL [+], MLM
ABDOMINAL PAIN/NAUSEA/+loose stools/VOMITING/MELENA DIARRHEA/+loose stools/NAUSEA/ABDOMINAL PAIN/VOMITING

## 2020-06-26 NOTE — ED PROVIDER NOTE - CLINICAL SUMMARY MEDICAL DECISION MAKING FREE TEXT BOX
Pt presents with n/v, loose stools, burning abd pain since stress test on 6/24. Will do EKG labs, IV fluids, Pepcid, Zofran and CT.

## 2020-06-26 NOTE — H&P ADULT - NSHPSOCIALHISTORY_GEN_ALL_CORE
Tobacco: quit 35 yrs ago, former 2 pack a day smoker for 20 years  EtOH: denies  Recreational drug use: denies  Lives with: wife  Ambulates: independent  ADLs: independent  Last colonoscopy: 2 years ago - normal

## 2020-06-27 LAB
ALBUMIN SERPL ELPH-MCNC: 3.1 G/DL — LOW (ref 3.3–5)
ALP SERPL-CCNC: 66 U/L — SIGNIFICANT CHANGE UP (ref 40–120)
ALT FLD-CCNC: 16 U/L — SIGNIFICANT CHANGE UP (ref 12–78)
ANION GAP SERPL CALC-SCNC: 5 MMOL/L — SIGNIFICANT CHANGE UP (ref 5–17)
AST SERPL-CCNC: 21 U/L — SIGNIFICANT CHANGE UP (ref 15–37)
BASOPHILS # BLD AUTO: 0.03 K/UL — SIGNIFICANT CHANGE UP (ref 0–0.2)
BASOPHILS NFR BLD AUTO: 0.3 % — SIGNIFICANT CHANGE UP (ref 0–2)
BILIRUB SERPL-MCNC: 1.3 MG/DL — HIGH (ref 0.2–1.2)
BUN SERPL-MCNC: 23 MG/DL — SIGNIFICANT CHANGE UP (ref 7–23)
CALCIUM SERPL-MCNC: 8 MG/DL — LOW (ref 8.5–10.1)
CHLORIDE SERPL-SCNC: 109 MMOL/L — HIGH (ref 96–108)
CO2 SERPL-SCNC: 26 MMOL/L — SIGNIFICANT CHANGE UP (ref 22–31)
CREAT SERPL-MCNC: 0.89 MG/DL — SIGNIFICANT CHANGE UP (ref 0.5–1.3)
EOSINOPHIL # BLD AUTO: 0.19 K/UL — SIGNIFICANT CHANGE UP (ref 0–0.5)
EOSINOPHIL NFR BLD AUTO: 2 % — SIGNIFICANT CHANGE UP (ref 0–6)
GLUCOSE SERPL-MCNC: 81 MG/DL — SIGNIFICANT CHANGE UP (ref 70–99)
HCT VFR BLD CALC: 40.1 % — SIGNIFICANT CHANGE UP (ref 39–50)
HGB BLD-MCNC: 13.6 G/DL — SIGNIFICANT CHANGE UP (ref 13–17)
IMM GRANULOCYTES NFR BLD AUTO: 0.5 % — SIGNIFICANT CHANGE UP (ref 0–1.5)
LYMPHOCYTES # BLD AUTO: 1.32 K/UL — SIGNIFICANT CHANGE UP (ref 1–3.3)
LYMPHOCYTES # BLD AUTO: 13.8 % — SIGNIFICANT CHANGE UP (ref 13–44)
MCHC RBC-ENTMCNC: 30 PG — SIGNIFICANT CHANGE UP (ref 27–34)
MCHC RBC-ENTMCNC: 33.9 GM/DL — SIGNIFICANT CHANGE UP (ref 32–36)
MCV RBC AUTO: 88.5 FL — SIGNIFICANT CHANGE UP (ref 80–100)
MONOCYTES # BLD AUTO: 1.06 K/UL — HIGH (ref 0–0.9)
MONOCYTES NFR BLD AUTO: 11.1 % — SIGNIFICANT CHANGE UP (ref 2–14)
NEUTROPHILS # BLD AUTO: 6.94 K/UL — SIGNIFICANT CHANGE UP (ref 1.8–7.4)
NEUTROPHILS NFR BLD AUTO: 72.3 % — SIGNIFICANT CHANGE UP (ref 43–77)
NRBC # BLD: 0 /100 WBCS — SIGNIFICANT CHANGE UP (ref 0–0)
PLATELET # BLD AUTO: 186 K/UL — SIGNIFICANT CHANGE UP (ref 150–400)
POTASSIUM SERPL-MCNC: 3.6 MMOL/L — SIGNIFICANT CHANGE UP (ref 3.5–5.3)
POTASSIUM SERPL-SCNC: 3.6 MMOL/L — SIGNIFICANT CHANGE UP (ref 3.5–5.3)
PROT SERPL-MCNC: 6.2 G/DL — SIGNIFICANT CHANGE UP (ref 6–8.3)
RBC # BLD: 4.53 M/UL — SIGNIFICANT CHANGE UP (ref 4.2–5.8)
RBC # FLD: 13 % — SIGNIFICANT CHANGE UP (ref 10.3–14.5)
SODIUM SERPL-SCNC: 140 MMOL/L — SIGNIFICANT CHANGE UP (ref 135–145)
TROPONIN I SERPL-MCNC: <.015 NG/ML — SIGNIFICANT CHANGE UP (ref 0.01–0.04)
WBC # BLD: 9.59 K/UL — SIGNIFICANT CHANGE UP (ref 3.8–10.5)
WBC # FLD AUTO: 9.59 K/UL — SIGNIFICANT CHANGE UP (ref 3.8–10.5)

## 2020-06-27 PROCEDURE — 99223 1ST HOSP IP/OBS HIGH 75: CPT

## 2020-06-27 PROCEDURE — 99233 SBSQ HOSP IP/OBS HIGH 50: CPT

## 2020-06-27 RX ORDER — FUROSEMIDE 40 MG
40 TABLET ORAL DAILY
Refills: 0 | Status: DISCONTINUED | OUTPATIENT
Start: 2020-06-27 | End: 2020-06-27

## 2020-06-27 RX ORDER — SODIUM CHLORIDE 9 MG/ML
1000 INJECTION, SOLUTION INTRAVENOUS
Refills: 0 | Status: DISCONTINUED | OUTPATIENT
Start: 2020-06-27 | End: 2020-06-29

## 2020-06-27 RX ADMIN — Medication 40 MILLIGRAM(S): at 12:27

## 2020-06-27 RX ADMIN — SODIUM CHLORIDE 75 MILLILITER(S): 9 INJECTION, SOLUTION INTRAVENOUS at 15:47

## 2020-06-27 RX ADMIN — ATORVASTATIN CALCIUM 10 MILLIGRAM(S): 80 TABLET, FILM COATED ORAL at 21:25

## 2020-06-27 RX ADMIN — Medication 25 MILLIGRAM(S): at 05:56

## 2020-06-27 RX ADMIN — FAMOTIDINE 20 MILLIGRAM(S): 10 INJECTION INTRAVENOUS at 17:32

## 2020-06-27 RX ADMIN — ENOXAPARIN SODIUM 40 MILLIGRAM(S): 100 INJECTION SUBCUTANEOUS at 12:26

## 2020-06-27 RX ADMIN — AMLODIPINE BESYLATE 10 MILLIGRAM(S): 2.5 TABLET ORAL at 05:56

## 2020-06-27 RX ADMIN — Medication 25 MILLIGRAM(S): at 17:32

## 2020-06-27 RX ADMIN — FAMOTIDINE 20 MILLIGRAM(S): 10 INJECTION INTRAVENOUS at 05:56

## 2020-06-27 RX ADMIN — Medication 40 MILLIGRAM(S): at 05:56

## 2020-06-27 RX ADMIN — TAMSULOSIN HYDROCHLORIDE 0.4 MILLIGRAM(S): 0.4 CAPSULE ORAL at 21:25

## 2020-06-27 RX ADMIN — Medication 325 MILLIGRAM(S): at 21:25

## 2020-06-27 RX ADMIN — FINASTERIDE 5 MILLIGRAM(S): 5 TABLET, FILM COATED ORAL at 12:27

## 2020-06-27 RX ADMIN — LOSARTAN POTASSIUM 100 MILLIGRAM(S): 100 TABLET, FILM COATED ORAL at 05:56

## 2020-06-27 NOTE — PROGRESS NOTE ADULT - PROBLEM SELECTOR PLAN 2
nuclear stress on 6/24 - unchanged from previous as per patient  -EKG: NSR with LAFB  -troponins x1 - negative, f/u repeat   -chest pain described by patient more likely epigastric  -continue home aspirin 325mg daily and statin  -consider cardio consult if not improving

## 2020-06-27 NOTE — PROGRESS NOTE ADULT - SUBJECTIVE AND OBJECTIVE BOX
Patient is a 86y old  Male who presents with a chief complaint of SBO (27 Jun 2020 16:24)      INTERVAL HPI: Pt seen and examined. States he is feeling better, able to pass below and above, has mild discomofrt that is intermittent but otherwise improving. Denies any other acute complaints at this time.     OVERNIGHT EVENTS: none noted  T(F): 97.5 (06-27-20 @ 20:20), Max: 98.7 (06-27-20 @ 04:17)  HR: 59 (06-27-20 @ 20:20) (59 - 75)  BP: 143/74 (06-27-20 @ 20:20) (111/61 - 170/71)  RR: 20 (06-27-20 @ 20:20) (17 - 20)  SpO2: 96% (06-27-20 @ 20:20) (64% - 98%)  I&O's Summary    26 Jun 2020 07:01  -  27 Jun 2020 07:00  --------------------------------------------------------  IN: 400 mL / OUT: 0 mL / NET: 400 mL        Review of Systems:  Constitutional: denies fever, chills, sweating  HEENT: denies headache, dizziness, or lightheadedness  Respiratory: denies SOB, cough, or wheezing  Cardiovascular: denies CP, palpitations  Gastrointestinal: improving abdominal pain, nausea, vomiting, diarrhea, denies bloody stools  Genitourinary: denies painful urination, increased frequency, urgency, or bloody urine  Skin/Breast: denies rashes or itching  Musculoskeletal: denies muscle aches, joint swelling, or muscle weakness  Neurologic: denies loss of sensation, numbness, or tingling ROS negative except as noted above	    Physical Exam:  	Gen: well appearing, NAD, elder  	HEENT: NCAT, PEERLA b/l, EOMI b/l, no conjunctival erythema  	Cardio: regular rate and rhythm, +s1s2, no murmurs, rubs, or gallops  	Pulm: CTA b/l, no wheezes, rales or rhonchi  	Abdomen: soft, mildly distended, mildly tender to palpation diffusely, hypoactive BS x4 quadrants, no guarding  	Extremities: no clubbing, cyanosis or edema, +2 pedal pulses  	Neuro: AAOx3, muscle strength grossly intact  Skin: warm and dry    LABS:                        13.6   9.59  )-----------( 186      ( 27 Jun 2020 07:29 )             40.1     06-27    140  |  109<H>  |  23  ----------------------------<  81  3.6   |  26  |  0.89    Ca    8.0<L>      27 Jun 2020 07:29    TPro  6.2  /  Alb  3.1<L>  /  TBili  1.3<H>  /  DBili  x   /  AST  21  /  ALT  16  /  AlkPhos  66  06-27    PT/INR - ( 26 Jun 2020 17:34 )   PT: 13.5 sec;   INR: 1.21 ratio         PTT - ( 26 Jun 2020 17:34 )  PTT:27.0 sec    CAPILLARY BLOOD GLUCOSE                  MEDICATIONS  (STANDING):  amLODIPine   Tablet 10 milliGRAM(s) Oral daily  aspirin 325 milliGRAM(s) Oral at bedtime  atorvastatin 10 milliGRAM(s) Oral at bedtime  enoxaparin Injectable 40 milliGRAM(s) SubCutaneous daily  famotidine Injectable 20 milliGRAM(s) IV Push every 12 hours  finasteride 5 milliGRAM(s) Oral daily  lactated ringers. 1000 milliLiter(s) (75 mL/Hr) IV Continuous <Continuous>  losartan 100 milliGRAM(s) Oral daily  metoprolol tartrate 25 milliGRAM(s) Oral two times a day  tamsulosin 0.4 milliGRAM(s) Oral at bedtime    MEDICATIONS  (PRN):  ondansetron Injectable 4 milliGRAM(s) IV Push every 6 hours PRN Nausea

## 2020-06-27 NOTE — PROGRESS NOTE ADULT - ASSESSMENT
87 yo M PMHx of CAD s/p MI and stents to RCA and LAD, COPD (on bipap at night), HTN, HLD, BPH presents to the ED with 2 days of abdominal pain and N/V. Admit for SBO

## 2020-06-27 NOTE — PROGRESS NOTE ADULT - SUBJECTIVE AND OBJECTIVE BOX
SUBJECTIVE:  87 y/o M seen and examined at bedside. Patient c/o some intermittent abdominal pain. Reports feeling bloated. +Voiding. NPO. Admits to passing flatus once today. Patient denies any fevers, chills, chest pain, shortness of breath, nausea, vomiting.    Vital Signs Last 24 Hrs  T(C): 37.1 (27 Jun 2020 04:17), Max: 37.1 (26 Jun 2020 18:35)  T(F): 98.7 (27 Jun 2020 04:17), Max: 98.7 (26 Jun 2020 18:35)  HR: 61 (27 Jun 2020 04:17) (61 - 78)  BP: 138/68 (27 Jun 2020 04:17) (122/69 - 170/71)  BP(mean): --  RR: 17 (27 Jun 2020 04:17) (16 - 19)  SpO2: 93% (27 Jun 2020 04:17) (93% - 98%)    PHYSICAL EXAM:  GENERAL: NAD, OOB in chair, comfortable  HEAD:  Atraumatic, Normocephalic  ABDOMEN: Soft, mild tenderness to palpation lower abdomen, hypoactive BS  NEUROLOGY: A&O x 3    LABS:                        13.6   9.59  )-----------( 186      ( 27 Jun 2020 07:29 )             40.1     06-27    140  |  109<H>  |  23  ----------------------------<  81  3.6   |  26  |  0.89    Ca    8.0<L>      27 Jun 2020 07:29    TPro  6.2  /  Alb  3.1<L>  /  TBili  1.3<H>  /  DBili  x   /  AST  21  /  ALT  16  /  AlkPhos  66  06-27    PT/INR - ( 26 Jun 2020 17:34 )   PT: 13.5 sec;   INR: 1.21 ratio         PTT - ( 26 Jun 2020 17:34 )  PTT:27.0 sec      ASSESSMENT  87 y/o M transferred from [place], CT findings consistent with SBO with transition point in RLQ with collapsed distal ileum, currently with some intermittent abdominal pain    PLAN  - continue care as per primary team  - pain control, supportive care, OOB  - NPO, IVF   - continue DVT ppx   - Monitor GI function  - serial abdominal exams  - labs in AM  - Recommend GI consult   - Case discussed with Dr. Simpson     Surgical Team Contact Information  Spectralink: Ext: [phone] or [phone]  Pager: [phone]

## 2020-06-27 NOTE — PROGRESS NOTE ADULT - ATTENDING COMMENTS
Pt. seen, sitting very comfortably in chair. Minimal flatus this AM. Denies any abdominal pain. Abd is distended, but very soft. He denies N/V  For now will observe. He may need a repeat ct with po/iv contrast.

## 2020-06-27 NOTE — CONSULT NOTE ADULT - ASSESSMENT
87 yo M PMHx of CAD s/p MI and stents to RCA and LAD 1997, COPD (on bipap at night), HTN, HLD, BPH presents to the ED with 2 days of abdominal pain and N/V. Found to have SBO  Had a nuclear stress on 6/24  which showed small, mod fixed defect apex EF 68% which was unchanged from his previous test 06/2019.   TTE 01/2020 EF 72%, mild LVH, mild AI/PI/TR, sev LAE, PASP 38-43mmHg    CAD with normal LV function  - no s/s acute ischemia or HF at this time  - c/w ASA  - c/w statin  - c/w metoprolol, losartan, norvasc    HTN, mild LVH on TTE  - well controlled  - c/w BB, ARB, CCB    SBO  - surgery input appreciated, no surgical intervention at this time    Thank you for this consult, will follow along with you!

## 2020-06-27 NOTE — PROGRESS NOTE ADULT - PROBLEM SELECTOR PLAN 1
CT A/P: IMPRESSION: Distal small bowel obstruction. Mild interloop edema/fluid.  -admit to [facility] with remote tele  -as per surgery - no surgical intervention at this time, conservative management  -cont to monitor clinically  -apprec GI recs  -monitor i/os  -cont npo and ivf

## 2020-06-28 LAB
ALBUMIN SERPL ELPH-MCNC: 3.2 G/DL — LOW (ref 3.3–5)
ALP SERPL-CCNC: 66 U/L — SIGNIFICANT CHANGE UP (ref 40–120)
ALT FLD-CCNC: 17 U/L — SIGNIFICANT CHANGE UP (ref 12–78)
ANION GAP SERPL CALC-SCNC: 5 MMOL/L — SIGNIFICANT CHANGE UP (ref 5–17)
AST SERPL-CCNC: 24 U/L — SIGNIFICANT CHANGE UP (ref 15–37)
BASOPHILS # BLD AUTO: 0.04 K/UL — SIGNIFICANT CHANGE UP (ref 0–0.2)
BASOPHILS NFR BLD AUTO: 0.5 % — SIGNIFICANT CHANGE UP (ref 0–2)
BILIRUB SERPL-MCNC: 1.2 MG/DL — SIGNIFICANT CHANGE UP (ref 0.2–1.2)
BUN SERPL-MCNC: 24 MG/DL — HIGH (ref 7–23)
CALCIUM SERPL-MCNC: 8.1 MG/DL — LOW (ref 8.5–10.1)
CHLORIDE SERPL-SCNC: 107 MMOL/L — SIGNIFICANT CHANGE UP (ref 96–108)
CO2 SERPL-SCNC: 28 MMOL/L — SIGNIFICANT CHANGE UP (ref 22–31)
CREAT SERPL-MCNC: 0.93 MG/DL — SIGNIFICANT CHANGE UP (ref 0.5–1.3)
EOSINOPHIL # BLD AUTO: 0.25 K/UL — SIGNIFICANT CHANGE UP (ref 0–0.5)
EOSINOPHIL NFR BLD AUTO: 3.4 % — SIGNIFICANT CHANGE UP (ref 0–6)
GLUCOSE SERPL-MCNC: 72 MG/DL — SIGNIFICANT CHANGE UP (ref 70–99)
HCT VFR BLD CALC: 39.7 % — SIGNIFICANT CHANGE UP (ref 39–50)
HGB BLD-MCNC: 13.7 G/DL — SIGNIFICANT CHANGE UP (ref 13–17)
IMM GRANULOCYTES NFR BLD AUTO: 0.4 % — SIGNIFICANT CHANGE UP (ref 0–1.5)
LYMPHOCYTES # BLD AUTO: 0.93 K/UL — LOW (ref 1–3.3)
LYMPHOCYTES # BLD AUTO: 12.6 % — LOW (ref 13–44)
MCHC RBC-ENTMCNC: 30.1 PG — SIGNIFICANT CHANGE UP (ref 27–34)
MCHC RBC-ENTMCNC: 34.5 GM/DL — SIGNIFICANT CHANGE UP (ref 32–36)
MCV RBC AUTO: 87.3 FL — SIGNIFICANT CHANGE UP (ref 80–100)
MONOCYTES # BLD AUTO: 1.08 K/UL — HIGH (ref 0–0.9)
MONOCYTES NFR BLD AUTO: 14.6 % — HIGH (ref 2–14)
NEUTROPHILS # BLD AUTO: 5.08 K/UL — SIGNIFICANT CHANGE UP (ref 1.8–7.4)
NEUTROPHILS NFR BLD AUTO: 68.5 % — SIGNIFICANT CHANGE UP (ref 43–77)
NRBC # BLD: 0 /100 WBCS — SIGNIFICANT CHANGE UP (ref 0–0)
PLATELET # BLD AUTO: 182 K/UL — SIGNIFICANT CHANGE UP (ref 150–400)
POTASSIUM SERPL-MCNC: 3.2 MMOL/L — LOW (ref 3.5–5.3)
POTASSIUM SERPL-SCNC: 3.2 MMOL/L — LOW (ref 3.5–5.3)
PROT SERPL-MCNC: 6.4 G/DL — SIGNIFICANT CHANGE UP (ref 6–8.3)
RBC # BLD: 4.55 M/UL — SIGNIFICANT CHANGE UP (ref 4.2–5.8)
RBC # FLD: 12.7 % — SIGNIFICANT CHANGE UP (ref 10.3–14.5)
SODIUM SERPL-SCNC: 140 MMOL/L — SIGNIFICANT CHANGE UP (ref 135–145)
WBC # BLD: 7.41 K/UL — SIGNIFICANT CHANGE UP (ref 3.8–10.5)
WBC # FLD AUTO: 7.41 K/UL — SIGNIFICANT CHANGE UP (ref 3.8–10.5)

## 2020-06-28 PROCEDURE — 99232 SBSQ HOSP IP/OBS MODERATE 35: CPT

## 2020-06-28 PROCEDURE — 99233 SBSQ HOSP IP/OBS HIGH 50: CPT

## 2020-06-28 PROCEDURE — 74177 CT ABD & PELVIS W/CONTRAST: CPT | Mod: 26

## 2020-06-28 RX ORDER — POTASSIUM CHLORIDE 20 MEQ
40 PACKET (EA) ORAL EVERY 4 HOURS
Refills: 0 | Status: COMPLETED | OUTPATIENT
Start: 2020-06-28 | End: 2020-06-29

## 2020-06-28 RX ORDER — IOHEXOL 300 MG/ML
15 INJECTION, SOLUTION INTRAVENOUS
Refills: 0 | Status: COMPLETED | OUTPATIENT
Start: 2020-06-28 | End: 2020-06-28

## 2020-06-28 RX ADMIN — FAMOTIDINE 20 MILLIGRAM(S): 10 INJECTION INTRAVENOUS at 05:45

## 2020-06-28 RX ADMIN — Medication 325 MILLIGRAM(S): at 22:59

## 2020-06-28 RX ADMIN — FINASTERIDE 5 MILLIGRAM(S): 5 TABLET, FILM COATED ORAL at 13:59

## 2020-06-28 RX ADMIN — Medication 25 MILLIGRAM(S): at 17:46

## 2020-06-28 RX ADMIN — TAMSULOSIN HYDROCHLORIDE 0.4 MILLIGRAM(S): 0.4 CAPSULE ORAL at 22:59

## 2020-06-28 RX ADMIN — LOSARTAN POTASSIUM 100 MILLIGRAM(S): 100 TABLET, FILM COATED ORAL at 05:45

## 2020-06-28 RX ADMIN — AMLODIPINE BESYLATE 10 MILLIGRAM(S): 2.5 TABLET ORAL at 05:45

## 2020-06-28 RX ADMIN — IOHEXOL 15 MILLILITER(S): 300 INJECTION, SOLUTION INTRAVENOUS at 09:05

## 2020-06-28 RX ADMIN — ENOXAPARIN SODIUM 40 MILLIGRAM(S): 100 INJECTION SUBCUTANEOUS at 13:59

## 2020-06-28 RX ADMIN — IOHEXOL 15 MILLILITER(S): 300 INJECTION, SOLUTION INTRAVENOUS at 10:00

## 2020-06-28 RX ADMIN — ATORVASTATIN CALCIUM 10 MILLIGRAM(S): 80 TABLET, FILM COATED ORAL at 22:59

## 2020-06-28 RX ADMIN — Medication 40 MILLIEQUIVALENT(S): at 22:59

## 2020-06-28 RX ADMIN — Medication 25 MILLIGRAM(S): at 05:45

## 2020-06-28 RX ADMIN — FAMOTIDINE 20 MILLIGRAM(S): 10 INJECTION INTRAVENOUS at 17:46

## 2020-06-28 NOTE — PROGRESS NOTE ADULT - PROBLEM SELECTOR PLAN 1
-repeat ct abd shows cont sbo with partial obstruction  -apprec gen surg and GI collaboration  -cont npo with ivf  -monitor serial abd exams and xrays/imaginig as per gen surg  -monitor i/os  preop eval if OR needed, pt is considered an intermediate risk candidate for a low to intermediate risk procedure and is medically optimized at this time, apprec cardio optimization

## 2020-06-28 NOTE — PROGRESS NOTE ADULT - SUBJECTIVE AND OBJECTIVE BOX
SUBJECTIVE:  85 y/o M seen and examined at bedside. Patient states he is feeling better today without pain or nausea. He has been passing flatus. NPO. Awaiting CT scan today. Patient denies any fevers, chills, chest pain, shortness of breath, abdominal pain, nausea, vomiting.    Vital Signs Last 24 Hrs  T(C): 36.8 (28 Jun 2020 04:40), Max: 36.8 (28 Jun 2020 04:40)  T(F): 98.3 (28 Jun 2020 04:40), Max: 98.3 (28 Jun 2020 04:40)  HR: 62 (28 Jun 2020 04:40) (59 - 71)  BP: 129/67 (28 Jun 2020 04:40) (111/61 - 143/74)  BP(mean): --  RR: 18 (28 Jun 2020 04:40) (18 - 20)  SpO2: 93% (28 Jun 2020 04:40) (64% - 96%)    PHYSICAL EXAM:  GENERAL: NAD, OOB   HEAD:  Atraumatic, Normocephalic  ABDOMEN: Protuberant, very mildly tender to palpation mid abdomen, hypoactive BS  NEUROLOGY: A&O x 3    LABS:                        13.7   7.41  )-----------( 182      ( 28 Jun 2020 06:53 )             39.7     06-28    140  |  107  |  24<H>  ----------------------------<  72  3.2<L>   |  28  |  0.93    Ca    8.1<L>      28 Jun 2020 06:53    TPro  6.4  /  Alb  3.2<L>  /  TBili  1.2  /  DBili  x   /  AST  24  /  ALT  17  /  AlkPhos  66  06-28    PT/INR - ( 26 Jun 2020 17:34 )   PT: 13.5 sec;   INR: 1.21 ratio         PTT - ( 26 Jun 2020 17:34 )  PTT:27.0 sec    ASSESSMENT  85 y/o M transferred from [place], CT findings consistent with SBO with transition point in RLQ with collapsed distal ileum, currently feeling better, +F, NPO    PLAN  - continue care as per primary team  - pain control, supportive care, OOB  - NPO, IVF   - continue DVT ppx   - serial abdominal exams  - labs in AM, replete lytes PRN  - Recommend GI consult  - F/U results of rpt CT scan today  - Will discuss with Dr. Carpio (covering for Dr. Simpson today).     Surgical Team Contact Information  Spectralink: Ext: [phone] or [phone]  Pager: [phone]

## 2020-06-28 NOTE — PROGRESS NOTE ADULT - PROBLEM SELECTOR PLAN 3
-chronic, stbale  -on bipap at night, not on day time O2  -will continue bipap at night  -michele prn for SOB and wheezing

## 2020-06-28 NOTE — PROGRESS NOTE ADULT - SUBJECTIVE AND OBJECTIVE BOX
Patient is a 86y old  Male who presents with a chief complaint of SBO (28 Jun 2020 11:14)      INTERVAL HPI: Pt seen and examined. States he is doing ok, still has some abd pain, unable to pass below only from above, still some distension but other offers no other acute complaints at this time.     OVERNIGHT EVENTS: none noted  T(F): 98.2 (06-28-20 @ 20:43), Max: 98.3 (06-28-20 @ 04:40)  HR: 62 (06-28-20 @ 20:43) (59 - 66)  BP: 147/71 (06-28-20 @ 20:43) (129/67 - 154/68)  RR: 20 (06-28-20 @ 20:43) (18 - 20)  SpO2: 96% (06-28-20 @ 20:43) (93% - 96%)  I&O's Summary    27 Jun 2020 07:01  -  28 Jun 2020 07:00  --------------------------------------------------------  IN: 0 mL / OUT: 1700 mL / NET: -1700 mL        Review of Systems:  Constitutional: denies fever, chills, sweating  HEENT: denies headache, dizziness, or lightheadedness  Respiratory: denies SOB, cough, or wheezing  Cardiovascular: denies CP, palpitations  Gastrointestinal: improving abdominal pain, nausea, vomiting, diarrhea, denies bloody stools  Genitourinary: denies painful urination, increased frequency, urgency, or bloody urine  Skin/Breast: denies rashes or itching  Musculoskeletal: denies muscle aches, joint swelling, or muscle weakness  Neurologic: denies loss of sensation, numbness, or tingling ROS negative except as noted above	    Physical Exam:  	Gen: well appearing, NAD, elder  	HEENT: NCAT, PEERLA b/l, EOMI b/l, no conjunctival erythema  	Cardio: regular rate and rhythm, +s1s2, no murmurs, rubs, or gallops  	Pulm: CTA b/l, no wheezes, rales or rhonchi  	Abdomen: soft, mildly distended, mildly tender to palpation diffusely, hypoactive BS x4 quadrants, no guarding  	Extremities: no clubbing, cyanosis or edema, +2 pedal pulses  	Neuro: AAOx3, muscle strength grossly intact  Skin: warm and dry  LABS:                        13.7   7.41  )-----------( 182      ( 28 Jun 2020 06:53 )             39.7     06-28    140  |  107  |  24<H>  ----------------------------<  72  3.2<L>   |  28  |  0.93    Ca    8.1<L>      28 Jun 2020 06:53    TPro  6.4  /  Alb  3.2<L>  /  TBili  1.2  /  DBili  x   /  AST  24  /  ALT  17  /  AlkPhos  66  06-28        CAPILLARY BLOOD GLUCOSE                  MEDICATIONS  (STANDING):  amLODIPine   Tablet 10 milliGRAM(s) Oral daily  aspirin 325 milliGRAM(s) Oral at bedtime  atorvastatin 10 milliGRAM(s) Oral at bedtime  enoxaparin Injectable 40 milliGRAM(s) SubCutaneous daily  famotidine Injectable 20 milliGRAM(s) IV Push every 12 hours  finasteride 5 milliGRAM(s) Oral daily  lactated ringers. 1000 milliLiter(s) (75 mL/Hr) IV Continuous <Continuous>  losartan 100 milliGRAM(s) Oral daily  metoprolol tartrate 25 milliGRAM(s) Oral two times a day  potassium chloride    Tablet ER 40 milliEquivalent(s) Oral every 4 hours  tamsulosin 0.4 milliGRAM(s) Oral at bedtime    MEDICATIONS  (PRN):  ondansetron Injectable 4 milliGRAM(s) IV Push every 6 hours PRN Nausea

## 2020-06-28 NOTE — PROGRESS NOTE ADULT - PROBLEM SELECTOR PLAN 2
-chronic, stable  nuclear stress on 6/24 - unchanged from previous as per patient  -EKG: NSR with LAFB  -continue home aspirin 325mg daily and statin  -apprec cardio consults, may need cardio optimization if OR needed

## 2020-06-28 NOTE — PROGRESS NOTE ADULT - SUBJECTIVE AND OBJECTIVE BOX
[facility] -- Makayla Malik, Tonia, Denise, Tejas Comer Savella  Office # [phone]      Follow Up:    CAD  Subjective/Observations:   No events overnight resting comfortably in bed.  No complaints of chest pain, dyspnea, or palpitations reported. No signs of orthopnea or PND.     REVIEW OF SYSTEMS: All other review of systems is negative unless indicated above    PAST MEDICAL & SURGICAL HISTORY:  Pulmonary hypertension: moderate  COPD (chronic obstructive pulmonary disease): mild (no home oxygen)  Anxiety  CAD (coronary artery disease)  BPH (Benign Prostatic Hyperplasia)  Hyperlipemia  Hypertension  Detached retina, right: with repair  Bilateral cataracts: removed  H/O bilateral hip replacements      MEDICATIONS  (STANDING):  amLODIPine   Tablet 10 milliGRAM(s) Oral daily  aspirin 325 milliGRAM(s) Oral at bedtime  atorvastatin 10 milliGRAM(s) Oral at bedtime  enoxaparin Injectable 40 milliGRAM(s) SubCutaneous daily  famotidine Injectable 20 milliGRAM(s) IV Push every 12 hours  finasteride 5 milliGRAM(s) Oral daily  lactated ringers. 1000 milliLiter(s) (75 mL/Hr) IV Continuous <Continuous>  losartan 100 milliGRAM(s) Oral daily  metoprolol tartrate 25 milliGRAM(s) Oral two times a day  tamsulosin 0.4 milliGRAM(s) Oral at bedtime    MEDICATIONS  (PRN):  ondansetron Injectable 4 milliGRAM(s) IV Push every 6 hours PRN Nausea      Allergies    penicillin (Hives)    Intolerances        Vital Signs Last 24 Hrs  T(C): 36.8 (28 Jun 2020 04:40), Max: 36.8 (28 Jun 2020 04:40)  T(F): 98.3 (28 Jun 2020 04:40), Max: 98.3 (28 Jun 2020 04:40)  HR: 62 (28 Jun 2020 04:40) (59 - 71)  BP: 129/67 (28 Jun 2020 04:40) (111/61 - 143/74)  BP(mean): --  RR: 18 (28 Jun 2020 04:40) (18 - 20)  SpO2: 93% (28 Jun 2020 04:40) (64% - 96%)    I&O's Summary    27 Jun 2020 07:01  -  28 Jun 2020 07:00  --------------------------------------------------------  IN: 0 mL / OUT: 1700 mL / NET: -1700 mL          PHYSICAL EXAM:  TELE: Off tele   Constitutional: NAD, awake and alert, well-developed  HEENT: Moist Mucous Membranes, Anicteric  Pulmonary: Non-labored, breath sounds are clear bilaterally, No wheezing, crackles or rhonchi  Cardiovascular: Regular, S1 and S2 nl, No murmurs, rubs, gallops or clicks  Gastrointestinal: Bowel Sounds present, soft, nontender.   Lymph: No lymphadenopathy. No peripheral edema.  Skin: No visible rashes or ulcers.  Psych:  Mood & affect appropriate    LABS: All Labs Reviewed:                        13.7   7.41  )-----------( 182      ( 28 Jun 2020 06:53 )             39.7                         13.6   9.59  )-----------( 186      ( 27 Jun 2020 07:29 )             40.1                         15.6   13.81 )-----------( 212      ( 26 Jun 2020 14:33 )             45.6     28 Jun 2020 06:53    140    |  107    |  24     ----------------------------<  72     3.2     |  28     |  0.93   27 Jun 2020 07:29    140    |  109    |  23     ----------------------------<  81     3.6     |  26     |  0.89   26 Jun 2020 14:33    138    |  101    |  29     ----------------------------<  145    4.0     |  27     |  1.22     Ca    8.1        28 Jun 2020 06:53  Ca    8.0        27 Jun 2020 07:29  Ca    9.0        26 Jun 2020 14:33    TPro  6.4    /  Alb  3.2    /  TBili  1.2    /  DBili  x      /  AST  24     /  ALT  17     /  AlkPhos  66     28 Jun 2020 06:53  TPro  6.2    /  Alb  3.1    /  TBili  1.3    /  DBili  x      /  AST  21     /  ALT  16     /  AlkPhos  66     27 Jun 2020 07:29  TPro  7.5    /  Alb  3.7    /  TBili  1.4    /  DBili  x      /  AST  16     /  ALT  20     /  AlkPhos  76     26 Jun 2020 14:33    PT/INR - ( 26 Jun 2020 17:34 )   PT: 13.5 sec;   INR: 1.21 ratio         PTT - ( 26 Jun 2020 17:34 )  PTT:27.0 sec  CARDIAC MARKERS ( 27 Jun 2020 00:08 )  <.015 ng/mL / x     / x     / x     / x      CARDIAC MARKERS ( 26 Jun 2020 14:33 )  .000 ng/mL / x     / x     / x     / x             ECG:  < from: 12 Lead ECG (06.26.20 @ 14:12) >  Ventricular Rate 71 BPM    Atrial Rate 71 BPM    P-R Interval 152 ms    QRS Duration 98 ms    Q-T Interval 400 ms    QTC Calculation(Bezet) 434 ms    P Axis 53 degrees    R Axis -46 degrees    T Axis 44 degrees    Diagnosis Line Normal sinus rhythm  Left anterior fascicular block  Abnormal ECG  No previous ECGs available  Confirmed by Eboni Martínez MD ([id number]) on 6/26/2020 4:41:12 PM    < end of copied text >

## 2020-06-28 NOTE — PROGRESS NOTE ADULT - ATTENDING COMMENTS
I have personally seen, examined, and participated in the care of this patient. I have reviewed all pertinent clinical information, including history, physical exam, plan, and the PA's note and agree except as noted.    Elederly gentleman resting comfortably in bed in no distress.  Denies pain or nausea.  Protuberant abdomen (pt states is normal body habitus) with no previous abdominal surgery.  No scars or masses.  CT initially without Oral contrast upon admission.  Repeated today with.  Contrast remains in mid jejunal loops without progression to colon.  No clear transition point or mesenteric stranding as suggested on initial reports.  Suspicious for ileus vs partial obstruction.  Once again, the patient remains asymptomatic.  Vitals stable.    Continue conservative management.    Abdominal Xray in am.  If contrast has still not progressed to colon, may consider need for surgical exploration.  Seen by cardiology, clinically stable.    Will reassess in am or sooner should condition mandate.  Encourage incentives spirometry,  Continue DVT and GI prophylaxis.

## 2020-06-28 NOTE — PROGRESS NOTE ADULT - ASSESSMENT
85 yo M PMHx of CAD s/p MI and stents to RCA and LAD 1997, COPD (on bipap at night), HTN, HLD, BPH presents to the ED with 2 days of abdominal pain and N/V. Found to have SBO  Had a nuclear stress on 6/24  which showed small, mod fixed defect apex EF 68% which was unchanged from his previous test 06/2019.   TTE 01/2020 EF 72%, mild LVH, mild AI/PI/TR, sev LAE, PASP 38-43mmHg    CAD with normal LV function  - no s/s acute ischemia or HF at this time  - c/w ASA  - c/w statin  - c/w metoprolol, losartan, norvasc    HTN, mild LVH on TTE  - well controlled  - BP: 129/67 (06-28-20 @ 04:40) (111/61 - 143/74)  - c/w BB, ARB, CCB    SBO  - , no surgical intervention at this time per surgery  -per primary /surgical team    -From a cardiac standpoint the patient may be discharged home    -Monitor and replete lytes, keep K>4 and Mg >2  - Further cardiac workup will depend on clinical course.   - All other workup per primary team  Thank you for the consult  Will continue to follow  Main Jordan DNP, ANP-c  Cardiology   Spectra #[phone] 85 yo M PMHx of CAD s/p MI and stents to RCA and LAD 1997, COPD (on bipap at night), HTN, HLD, BPH presents to the ED with 2 days of abdominal pain and N/V. Found to have SBO  Had a nuclear stress on 6/24  which showed small, mod fixed defect apex EF 68% which was unchanged from his previous test 06/2019.   TTE 01/2020 EF 72%, mild LVH, mild AI/PI/TR, sev LAE, PASP 38-43mmHg    CAD with normal LV function  - no s/s acute ischemia or HF at this time  - c/w ASA  - c/w statin  - c/w metoprolol, losartan, norvasc    HTN, mild LVH on TTE  - well controlled  - BP: 129/67 (06-28-20 @ 04:40) (111/61 - 143/74)  - c/w BB, ARB, CCB    SBO  - no surgical intervention at this time per surgery  - repeat CT scan  - per primary /surgical team    -From a cardiac standpoint the patient may be discharged home    -Monitor and replete lytes, keep K>4 and Mg >2  - Further cardiac workup will depend on clinical course.   - All other workup per primary team    Will continue to follow  Main Jordan DNP, ANP-c  Cardiology   Spectra #[phone]

## 2020-06-28 NOTE — PROGRESS NOTE ADULT - ASSESSMENT
85 yo M PMHx of CAD s/p MI and stents to RCA and LAD, COPD (on bipap at night), HTN, HLD, BPH presents to the ED with 2 days of abdominal pain and N/V. Admit for SBO

## 2020-06-29 LAB
ALBUMIN SERPL ELPH-MCNC: 3.1 G/DL — LOW (ref 3.3–5)
ALP SERPL-CCNC: 70 U/L — SIGNIFICANT CHANGE UP (ref 40–120)
ALT FLD-CCNC: 18 U/L — SIGNIFICANT CHANGE UP (ref 12–78)
ANION GAP SERPL CALC-SCNC: 8 MMOL/L — SIGNIFICANT CHANGE UP (ref 5–17)
AST SERPL-CCNC: 26 U/L — SIGNIFICANT CHANGE UP (ref 15–37)
BASOPHILS # BLD AUTO: 0.04 K/UL — SIGNIFICANT CHANGE UP (ref 0–0.2)
BASOPHILS NFR BLD AUTO: 0.6 % — SIGNIFICANT CHANGE UP (ref 0–2)
BILIRUB SERPL-MCNC: 1.1 MG/DL — SIGNIFICANT CHANGE UP (ref 0.2–1.2)
BUN SERPL-MCNC: 16 MG/DL — SIGNIFICANT CHANGE UP (ref 7–23)
CALCIUM SERPL-MCNC: 8.2 MG/DL — LOW (ref 8.5–10.1)
CHLORIDE SERPL-SCNC: 107 MMOL/L — SIGNIFICANT CHANGE UP (ref 96–108)
CO2 SERPL-SCNC: 24 MMOL/L — SIGNIFICANT CHANGE UP (ref 22–31)
CREAT SERPL-MCNC: 0.76 MG/DL — SIGNIFICANT CHANGE UP (ref 0.5–1.3)
EOSINOPHIL # BLD AUTO: 0.19 K/UL — SIGNIFICANT CHANGE UP (ref 0–0.5)
EOSINOPHIL NFR BLD AUTO: 2.7 % — SIGNIFICANT CHANGE UP (ref 0–6)
GLUCOSE SERPL-MCNC: 69 MG/DL — LOW (ref 70–99)
HCT VFR BLD CALC: 39.7 % — SIGNIFICANT CHANGE UP (ref 39–50)
HGB BLD-MCNC: 14 G/DL — SIGNIFICANT CHANGE UP (ref 13–17)
IMM GRANULOCYTES NFR BLD AUTO: 0.4 % — SIGNIFICANT CHANGE UP (ref 0–1.5)
LYMPHOCYTES # BLD AUTO: 0.85 K/UL — LOW (ref 1–3.3)
LYMPHOCYTES # BLD AUTO: 12 % — LOW (ref 13–44)
MAGNESIUM SERPL-MCNC: 2.1 MG/DL — SIGNIFICANT CHANGE UP (ref 1.6–2.6)
MCHC RBC-ENTMCNC: 30 PG — SIGNIFICANT CHANGE UP (ref 27–34)
MCHC RBC-ENTMCNC: 35.3 GM/DL — SIGNIFICANT CHANGE UP (ref 32–36)
MCV RBC AUTO: 85.2 FL — SIGNIFICANT CHANGE UP (ref 80–100)
MONOCYTES # BLD AUTO: 0.83 K/UL — SIGNIFICANT CHANGE UP (ref 0–0.9)
MONOCYTES NFR BLD AUTO: 11.7 % — SIGNIFICANT CHANGE UP (ref 2–14)
NEUTROPHILS # BLD AUTO: 5.15 K/UL — SIGNIFICANT CHANGE UP (ref 1.8–7.4)
NEUTROPHILS NFR BLD AUTO: 72.6 % — SIGNIFICANT CHANGE UP (ref 43–77)
NRBC # BLD: 0 /100 WBCS — SIGNIFICANT CHANGE UP (ref 0–0)
PHOSPHATE SERPL-MCNC: 2.3 MG/DL — LOW (ref 2.5–4.5)
PLATELET # BLD AUTO: 192 K/UL — SIGNIFICANT CHANGE UP (ref 150–400)
POTASSIUM SERPL-MCNC: 3.9 MMOL/L — SIGNIFICANT CHANGE UP (ref 3.5–5.3)
POTASSIUM SERPL-SCNC: 3.9 MMOL/L — SIGNIFICANT CHANGE UP (ref 3.5–5.3)
PROT SERPL-MCNC: 6.4 G/DL — SIGNIFICANT CHANGE UP (ref 6–8.3)
RBC # BLD: 4.66 M/UL — SIGNIFICANT CHANGE UP (ref 4.2–5.8)
RBC # FLD: 12.5 % — SIGNIFICANT CHANGE UP (ref 10.3–14.5)
SODIUM SERPL-SCNC: 139 MMOL/L — SIGNIFICANT CHANGE UP (ref 135–145)
WBC # BLD: 7.09 K/UL — SIGNIFICANT CHANGE UP (ref 3.8–10.5)
WBC # FLD AUTO: 7.09 K/UL — SIGNIFICANT CHANGE UP (ref 3.8–10.5)

## 2020-06-29 PROCEDURE — 99232 SBSQ HOSP IP/OBS MODERATE 35: CPT

## 2020-06-29 PROCEDURE — 99233 SBSQ HOSP IP/OBS HIGH 50: CPT

## 2020-06-29 PROCEDURE — 74176 CT ABD & PELVIS W/O CONTRAST: CPT | Mod: 26

## 2020-06-29 RX ORDER — SODIUM,POTASSIUM PHOSPHATES 278-250MG
1 POWDER IN PACKET (EA) ORAL ONCE
Refills: 0 | Status: COMPLETED | OUTPATIENT
Start: 2020-06-29 | End: 2020-06-29

## 2020-06-29 RX ORDER — OXYBUTYNIN CHLORIDE 5 MG
5 TABLET ORAL
Refills: 0 | Status: DISCONTINUED | OUTPATIENT
Start: 2020-06-29 | End: 2020-07-01

## 2020-06-29 RX ORDER — SODIUM CHLORIDE 9 MG/ML
1000 INJECTION, SOLUTION INTRAVENOUS
Refills: 0 | Status: DISCONTINUED | OUTPATIENT
Start: 2020-06-29 | End: 2020-07-01

## 2020-06-29 RX ADMIN — ENOXAPARIN SODIUM 40 MILLIGRAM(S): 100 INJECTION SUBCUTANEOUS at 12:35

## 2020-06-29 RX ADMIN — FAMOTIDINE 20 MILLIGRAM(S): 10 INJECTION INTRAVENOUS at 18:04

## 2020-06-29 RX ADMIN — ATORVASTATIN CALCIUM 10 MILLIGRAM(S): 80 TABLET, FILM COATED ORAL at 21:04

## 2020-06-29 RX ADMIN — LOSARTAN POTASSIUM 100 MILLIGRAM(S): 100 TABLET, FILM COATED ORAL at 06:04

## 2020-06-29 RX ADMIN — Medication 325 MILLIGRAM(S): at 21:04

## 2020-06-29 RX ADMIN — Medication 25 MILLIGRAM(S): at 06:05

## 2020-06-29 RX ADMIN — Medication 25 MILLIGRAM(S): at 18:04

## 2020-06-29 RX ADMIN — FINASTERIDE 5 MILLIGRAM(S): 5 TABLET, FILM COATED ORAL at 12:35

## 2020-06-29 RX ADMIN — TAMSULOSIN HYDROCHLORIDE 0.4 MILLIGRAM(S): 0.4 CAPSULE ORAL at 21:04

## 2020-06-29 RX ADMIN — Medication 1 PACKET(S): at 12:35

## 2020-06-29 RX ADMIN — AMLODIPINE BESYLATE 10 MILLIGRAM(S): 2.5 TABLET ORAL at 06:05

## 2020-06-29 RX ADMIN — Medication 40 MILLIEQUIVALENT(S): at 02:04

## 2020-06-29 RX ADMIN — FAMOTIDINE 20 MILLIGRAM(S): 10 INJECTION INTRAVENOUS at 06:05

## 2020-06-29 NOTE — PROGRESS NOTE ADULT - SUBJECTIVE AND OBJECTIVE BOX
SURGERY PA NOTE ON BEHALF OF DR. SIMPSON / GENERAL SURGERY:    S: Patient seen and examined at bedside.  Reports he is feeling better - c/o heartburn, otherwise denies any abdominal pain.  Denies N/V.  +Small amounts of flatus.  No BM since admission.  +Urinating, +ambulating.      MEDICATIONS:  amLODIPine   Tablet 10 milliGRAM(s) Oral daily  aspirin 325 milliGRAM(s) Oral at bedtime  atorvastatin 10 milliGRAM(s) Oral at bedtime  enoxaparin Injectable 40 milliGRAM(s) SubCutaneous daily  famotidine Injectable 20 milliGRAM(s) IV Push every 12 hours  finasteride 5 milliGRAM(s) Oral daily  lactated ringers. 1000 milliLiter(s) IV Continuous <Continuous>  losartan 100 milliGRAM(s) Oral daily  metoprolol tartrate 25 milliGRAM(s) Oral two times a day  ondansetron Injectable 4 milliGRAM(s) IV Push every 6 hours PRN  tamsulosin 0.4 milliGRAM(s) Oral at bedtime      O:  Vital Signs Last 24 Hrs  T(C): 36.6 (29 Jun 2020 05:01), Max: 36.8 (28 Jun 2020 20:43)  T(F): 97.8 (29 Jun 2020 05:01), Max: 98.2 (28 Jun 2020 20:43)  HR: 68 (29 Jun 2020 05:01) (59 - 68)  BP: 143/65 (29 Jun 2020 05:01) (142/72 - 154/68)  BP(mean): --  RR: 17 (29 Jun 2020 05:01) (17 - 20)  SpO2: 93% (29 Jun 2020 05:01) (93% - 96%)    I&O SUMMARY:    06-28-20 @ 07:01  -  06-29-20 @ 07:00  --------------------------------------------------------  IN: 0 mL / OUT: 2900 mL / NET: -2900 mL        PHYSICAL EXAM:  Lungs: CTA bilat  Card: S1S2  Abd: Mild to moderate distention, +tympani bilat UQ.  +BS x 4.  Non-tender throughout.    Ext: Calves soft, NT, no edema bilat LE.      LABS:                        14.0   7.09  )-----------( 192      ( 29 Jun 2020 08:24 )             39.7     06-29    139  |  107  |  16  ----------------------------<  69<L>  3.9   |  24  |  0.76    Ca    8.2<L>      29 Jun 2020 08:24  Phos  2.3     06-29  Mg     2.1     06-29    TPro  6.4  /  Alb  3.1<L>  /  TBili  1.1  /  DBili  x   /  AST  26  /  ALT  18  /  AlkPhos  70  06-29      RADIOLOGY:  EXAM:  CT ABDOMEN AND PELVIS                        PROCEDURE DATE:  06/29/2020    INTERPRETATION:  Indication: Follow-up of small bowel obstruction.  CT abdomen and pelvis no exogenous oral or intravenous contrast. Prior 6/20/2020.  Compared to prior study there is interval decrease in distention of small bowel loops. Mild distention of small bowel persists at this time. Most of the previously administered oral contrast noted throughout the colon. Very little residual contrast inthe small bowel. Remainder study unchanged. No free air free fluid or other new abnormality.  Impression: Interval improvement as described. Recommend follow-up with serial plain films as clinically indicated  NEMESIO GARCIA M.D., ATTENDING RADIOLOGIST  This document has been electronically signed. Jun 29 2020  9:27AM      A: 85 y/o M with extensive pmhx, transferred from [place] ED c/o N/V with abdominal discomfort since Thursday, and CT abd/pelv findings consistent with SBO - improving as per CT performed today    P: Discussed with Dr. Simpson      Advanced to clears, IVF decreased      Phosphorous repleted      Continue current care      Will check diet tolerance, serial abdominal exams, possible AXR in am tomorrow      Will follow

## 2020-06-29 NOTE — DIETITIAN INITIAL EVALUATION ADULT. - OTHER INFO
Per chart, pt is a 85 y/o M with PMH CAD s/p MI and stents to RCA and LAD, COPD (on bipap at night), HTN, HLD, BPH presents with 2 days of abdominal pain and N/V. Found to have SBO.    Upon visit, pt denies nausea/vomiting or GI distress at this time (noted frequent episodes of emesis/diarrhea PTA, however none at present). Denies abdominal pain. Reports he is passing gas. +small BM 6/29. No issues chewing/swallowing, wears good-fitting partial lower dentures. NKFA. Diet currently advanced to clears today. Low intake noted PTA due to vomiting/diarrhea, however pt currently reports hungry appetite and a desire to consume liquid items. UBW ~210 lb, reports small intentional weight loss of approximately 8-9 lb via "cutting out" sweets. States goal weight of <200 lb. Current weight 203.7 lb, pt confirms probable weight loss due to frequent vomiting/low intake PTA. Denies adherence to therapeutic diet at home aside from general healthful diet along with moderate exercise. Pt offered dietary education regarding achieving a healthy goal weight, however defers at this time. Provided verbal education regarding clear liquid diet and progression as medically feasible. Pt verbalized understanding.

## 2020-06-29 NOTE — PROGRESS NOTE ADULT - SUBJECTIVE AND OBJECTIVE BOX
Patient is a 86y old  Male who presents with a chief complaint of SBO (29 Jun 2020 11:20)      INTERVAL HPI:  OVERNIGHT EVENTS:  T(F): 98.2 (06-29-20 @ 14:01), Max: 98.2 (06-28-20 @ 20:43)  HR: 74 (06-29-20 @ 18:03) (62 - 74)  BP: 160/71 (06-29-20 @ 18:03) (135/74 - 160/71)  RR: 17 (06-29-20 @ 14:01) (17 - 20)  SpO2: 95% (06-29-20 @ 14:01) (93% - 96%)  I&O's Summary    28 Jun 2020 07:01  -  29 Jun 2020 07:00  --------------------------------------------------------  IN: 0 mL / OUT: 2900 mL / NET: -2900 mL    29 Jun 2020 07:01  -  29 Jun 2020 18:33  --------------------------------------------------------  IN: 0 mL / OUT: 200 mL / NET: -200 mL        REVIEW OF SYSTEMS:  CONSTITUTIONAL: No fever, weight loss, or fatigue  EYES: No eye pain, visual disturbances, or discharge  ENMT:  No difficulty hearing, tinnitus, vertigo; No sinus or throat pain  NECK: No pain or stiffness  BREASTS: No pain, masses, or nipple discharge  RESPIRATORY: No cough, wheezing, chills or hemoptysis; No shortness of breath  CARDIOVASCULAR: No chest pain, palpitations, dizziness, or leg swelling  GASTROINTESTINAL: No abdominal or epigastric pain. No nausea, vomiting, or hematemesis; No diarrhea or constipation. No melena or hematochezia.  GENITOURINARY: No dysuria, frequency, hematuria, or incontinence  NEUROLOGICAL: No headaches, memory loss, loss of strength, numbness, or tremors  SKIN: No itching, burning, rashes, or lesions   LYMPH NODES: No enlarged glands  ENDOCRINE: No heat or cold intolerance; No hair loss  MUSCULOSKELETAL: No joint pain or swelling; No muscle, back, or extremity pain  PSYCHIATRIC: No depression, anxiety, mood swings, or difficulty sleeping  HEME/LYMPH: No easy bruising, or bleeding gums  ALLERY AND IMMUNOLOGIC: No hives or eczema    PHYSICAL EXAM:  GENERAL: NAD, well-groomed, well-developed  HEAD:  Atraumatic, Normocephalic  EYES: EOMI, PERRLA, conjunctiva and sclera clear  ENMT: No tonsillar erythema, exudates, or enlargement; Moist mucous membranes, Good dentition, No lesions  NECK: Supple, No JVD, Normal thyroid  NERVOUS SYSTEM:  Alert & Oriented X3, Good concentration; Motor Strength 5/5 B/L upper and lower extremities; DTRs 2+ intact and symmetric  CHEST/LUNG: Clear to percussion bilaterally; No rales, rhonchi, wheezing, or rubs  HEART: Regular rate and rhythm; No murmurs, rubs, or gallops  ABDOMEN: Soft, Nontender, Nondistended; Bowel sounds present  EXTREMITIES:  2+ Peripheral Pulses, No clubbing, cyanosis, or edema  LYMPH: No lymphadenopathy noted  SKIN: No rashes or lesions    LABS:                        14.0   7.09  )-----------( 192      ( 29 Jun 2020 08:24 )             39.7     06-29    139  |  107  |  16  ----------------------------<  69<L>  3.9   |  24  |  0.76    Ca    8.2<L>      29 Jun 2020 08:24  Phos  2.3     06-29  Mg     2.1     06-29    TPro  6.4  /  Alb  3.1<L>  /  TBili  1.1  /  DBili  x   /  AST  26  /  ALT  18  /  AlkPhos  70  06-29        CAPILLARY BLOOD GLUCOSE                  MEDICATIONS  (STANDING):  amLODIPine   Tablet 10 milliGRAM(s) Oral daily  aspirin 325 milliGRAM(s) Oral at bedtime  atorvastatin 10 milliGRAM(s) Oral at bedtime  enoxaparin Injectable 40 milliGRAM(s) SubCutaneous daily  famotidine Injectable 20 milliGRAM(s) IV Push every 12 hours  finasteride 5 milliGRAM(s) Oral daily  lactated ringers. 1000 milliLiter(s) (50 mL/Hr) IV Continuous <Continuous>  losartan 100 milliGRAM(s) Oral daily  metoprolol tartrate 25 milliGRAM(s) Oral two times a day  oxybutynin 5 milliGRAM(s) Oral two times a day  tamsulosin 0.4 milliGRAM(s) Oral at bedtime    MEDICATIONS  (PRN):  ondansetron Injectable 4 milliGRAM(s) IV Push every 6 hours PRN Nausea Patient is a 86y old  Male who presents with a chief complaint of SBO (29 Jun 2020 11:20)      INTERVAL HPI: Pt seen and examined. States he was able to have a small bm, passing small flatus as well, denies any other acute complaints at this time.    OVERNIGHT EVENTS: none noted  T(F): 98.2 (06-29-20 @ 14:01), Max: 98.2 (06-28-20 @ 20:43)  HR: 74 (06-29-20 @ 18:03) (62 - 74)  BP: 160/71 (06-29-20 @ 18:03) (135/74 - 160/71)  RR: 17 (06-29-20 @ 14:01) (17 - 20)  SpO2: 95% (06-29-20 @ 14:01) (93% - 96%)  I&O's Summary    28 Jun 2020 07:01  -  29 Jun 2020 07:00  --------------------------------------------------------  IN: 0 mL / OUT: 2900 mL / NET: -2900 mL    29 Jun 2020 07:01  -  29 Jun 2020 18:33  --------------------------------------------------------  IN: 0 mL / OUT: 200 mL / NET: -200 mL        Review of Systems:  Constitutional: denies fever, chills, sweating  HEENT: denies headache, dizziness, or lightheadedness  Respiratory: denies SOB, cough, or wheezing  Cardiovascular: denies CP, palpitations  Gastrointestinal: improving abdominal pain, nausea, vomiting, diarrhea, denies bloody stools  Genitourinary: denies painful urination, increased frequency, urgency, or bloody urine  Skin/Breast: denies rashes or itching  Musculoskeletal: denies muscle aches, joint swelling, or muscle weakness  Neurologic: denies loss of sensation, numbness, or tingling ROS negative except as noted above	    Physical Exam:  	Gen: well appearing, NAD, elder  	HEENT: NCAT, PEERLA b/l, EOMI b/l, no conjunctival erythema  	Cardio: regular rate and rhythm, +s1s2, no murmurs, rubs, or gallops  	Pulm: CTA b/l, no wheezes, rales or rhonchi  	Abdomen: soft, distended, nontender, hypoactive BS x4 quadrants, no guarding  	Extremities: no clubbing, cyanosis or edema, +2 pedal pulses  	Neuro: AAOx3, muscle strength grossly intact  Skin: warm and dry    LABS:                        14.0   7.09  )-----------( 192      ( 29 Jun 2020 08:24 )             39.7     06-29    139  |  107  |  16  ----------------------------<  69<L>  3.9   |  24  |  0.76    Ca    8.2<L>      29 Jun 2020 08:24  Phos  2.3     06-29  Mg     2.1     06-29    TPro  6.4  /  Alb  3.1<L>  /  TBili  1.1  /  DBili  x   /  AST  26  /  ALT  18  /  AlkPhos  70  06-29        CAPILLARY BLOOD GLUCOSE                  MEDICATIONS  (STANDING):  amLODIPine   Tablet 10 milliGRAM(s) Oral daily  aspirin 325 milliGRAM(s) Oral at bedtime  atorvastatin 10 milliGRAM(s) Oral at bedtime  enoxaparin Injectable 40 milliGRAM(s) SubCutaneous daily  famotidine Injectable 20 milliGRAM(s) IV Push every 12 hours  finasteride 5 milliGRAM(s) Oral daily  lactated ringers. 1000 milliLiter(s) (50 mL/Hr) IV Continuous <Continuous>  losartan 100 milliGRAM(s) Oral daily  metoprolol tartrate 25 milliGRAM(s) Oral two times a day  oxybutynin 5 milliGRAM(s) Oral two times a day  tamsulosin 0.4 milliGRAM(s) Oral at bedtime    MEDICATIONS  (PRN):  ondansetron Injectable 4 milliGRAM(s) IV Push every 6 hours PRN Nausea

## 2020-06-29 NOTE — PROGRESS NOTE ADULT - ASSESSMENT
85 yo M PMHx of CAD s/p MI and stents to RCA and LAD 1997, COPD (on bipap at night), HTN, HLD, BPH presents to the ED with 2 days of abdominal pain and N/V. Found to have SBO  Had a nuclear stress on 6/24  which showed small, mod fixed defect apex EF 68% which was unchanged from his previous test 06/2019.   TTE 01/2020 EF 72%, mild LVH, mild AI/PI/TR, sev LAE, PASP 38-43mmHg    CAD with normal LV function  - no s/s acute ischemia or HF at this time  - c/w ASA  - c/w statin  - c/w metoprolol, losartan, norvasc    HTN, mild LVH on TTE  - well controlled  - c/w BB, ARB, CCB    SBO  - no surgical intervention at this time per surgery  - per primary /surgical team    -From a cardiac standpoint the patient may be discharged home    -Monitor and replete lytes, keep K>4 and Mg >2  - Further cardiac workup will depend on clinical course.   - All other workup per primary team    Will continue to follow  Main Jordan DNP, ANP-c  Cardiology   Spectra #[id number]  [phone]

## 2020-06-29 NOTE — DIETITIAN INITIAL EVALUATION ADULT. - ADD RECOMMEND
1) When medically feasible, advance diet from clears to full liquid followed by eventual progression to soft, low fiber + DASH/TLC. 2) Encourage adequate intake as tolerated. Honor dietary preferences. 3) Monitor weights, labs, intake, GI tolerance, skin integrity.

## 2020-06-29 NOTE — PROGRESS NOTE ADULT - SUBJECTIVE AND OBJECTIVE BOX
[facility] -- Makayla Malik, Tonia, Denise, Tejas Comer Savella  Office # [phone]      Follow Up:    CAD  Subjective/Observations:   No events overnight resting comfortably in bed.  No complaints of chest pain, dyspnea, or palpitations reported. No signs of orthopnea or PND.     REVIEW OF SYSTEMS: All other review of systems is negative unless indicated above    PAST MEDICAL & SURGICAL HISTORY:  Pulmonary hypertension: moderate  COPD (chronic obstructive pulmonary disease): mild (no home oxygen)  Anxiety  CAD (coronary artery disease)  BPH (Benign Prostatic Hyperplasia)  Hyperlipemia  Hypertension  Detached retina, right: with repair  Bilateral cataracts: removed  H/O bilateral hip replacements      MEDICATIONS  (STANDING):  amLODIPine   Tablet 10 milliGRAM(s) Oral daily  aspirin 325 milliGRAM(s) Oral at bedtime  atorvastatin 10 milliGRAM(s) Oral at bedtime  enoxaparin Injectable 40 milliGRAM(s) SubCutaneous daily  famotidine Injectable 20 milliGRAM(s) IV Push every 12 hours  finasteride 5 milliGRAM(s) Oral daily  lactated ringers. 1000 milliLiter(s) (75 mL/Hr) IV Continuous <Continuous>  losartan 100 milliGRAM(s) Oral daily  metoprolol tartrate 25 milliGRAM(s) Oral two times a day  tamsulosin 0.4 milliGRAM(s) Oral at bedtime    MEDICATIONS  (PRN):  ondansetron Injectable 4 milliGRAM(s) IV Push every 6 hours PRN Nausea      Allergies    penicillin (Hives)    Intolerances        Vital Signs Last 24 Hrs  T(C): 36.6 (29 Jun 2020 05:01), Max: 36.8 (28 Jun 2020 20:43)  T(F): 97.8 (29 Jun 2020 05:01), Max: 98.2 (28 Jun 2020 20:43)  HR: 68 (29 Jun 2020 05:01) (59 - 68)  BP: 143/65 (29 Jun 2020 05:01) (142/72 - 154/68)  BP(mean): --  RR: 17 (29 Jun 2020 05:01) (17 - 20)  SpO2: 93% (29 Jun 2020 05:01) (93% - 96%)    I&O's Summary    28 Jun 2020 07:01  -  29 Jun 2020 07:00  --------------------------------------------------------  IN: 0 mL / OUT: 2900 mL / NET: -2900 mL          PHYSICAL EXAM:  TELE: Off tele   Constitutional: NAD, awake and alert, well-developed  HEENT: Moist Mucous Membranes, Anicteric  Pulmonary: Non-labored, breath sounds are clear bilaterally, No wheezing, crackles or rhonchi  Cardiovascular: Regular, S1 and S2 nl, No murmurs, rubs, gallops or clicks  Gastrointestinal: Bowel Sounds present, soft, nontender.   Lymph: No lymphadenopathy. No peripheral edema.  Skin: No visible rashes or ulcers.  Psych:  Mood & affect appropriate    LABS: All Labs Reviewed:                        14.0   7.09  )-----------( 192      ( 29 Jun 2020 08:24 )             39.7                         13.7   7.41  )-----------( 182      ( 28 Jun 2020 06:53 )             39.7                         13.6   9.59  )-----------( 186      ( 27 Jun 2020 07:29 )             40.1     29 Jun 2020 08:24    139    |  107    |  16     ----------------------------<  69     3.9     |  24     |  0.76   28 Jun 2020 06:53    140    |  107    |  24     ----------------------------<  72     3.2     |  28     |  0.93   27 Jun 2020 07:29    140    |  109    |  23     ----------------------------<  81     3.6     |  26     |  0.89     Ca    8.2        29 Jun 2020 08:24  Ca    8.1        28 Jun 2020 06:53  Ca    8.0        27 Jun 2020 07:29  Phos  2.3       29 Jun 2020 08:24  Mg     2.1       29 Jun 2020 08:24    TPro  6.4    /  Alb  3.1    /  TBili  1.1    /  DBili  x      /  AST  26     /  ALT  18     /  AlkPhos  70     29 Jun 2020 08:24  TPro  6.4    /  Alb  3.2    /  TBili  1.2    /  DBili  x      /  AST  24     /  ALT  17     /  AlkPhos  66     28 Jun 2020 06:53  TPro  6.2    /  Alb  3.1    /  TBili  1.3    /  DBili  x      /  AST  21     /  ALT  16     /  AlkPhos  66     27 Jun 2020 07:29           ECG:  < from: 12 Lead ECG (06.26.20 @ 14:12) >  Ventricular Rate 71 BPM    Atrial Rate 71 BPM    P-R Interval 152 ms    QRS Duration 98 ms    Q-T Interval 400 ms    QTC Calculation(Bezet) 434 ms    P Axis 53 degrees    R Axis -46 degrees    T Axis 44 degrees    Diagnosis Line Normal sinus rhythm  Left anterior fascicular block  Abnormal ECG  No previous ECGs available  Confirmed by Eboni Martínez MD ([id number]) on 6/26/2020 4:41:12 PM    < end of copied text >

## 2020-06-29 NOTE — PROGRESS NOTE ADULT - PROBLEM SELECTOR PLAN 1
-repeat ct abd shows cont sbo with partial obstruction  -apprec gen surg and GI collaboration  -cont npo with ivf  -monitor serial abd exams and xrays/imaginig as per gen surg  -monitor i/os  preop eval if OR needed, pt is considered an intermediate risk candidate for a low to intermediate risk procedure and is medically optimized at this time, apprec cardio optimization -repeat ct abd shows cont sbo with partial obstruction with some improvement  -apprec gen surg and GI collaboration  -cont npo with ivf  -monitor serial abd exams and xrays/imaginig as per gen surg  -monitor i/os  preop eval if OR needed, pt is considered an intermediate risk candidate for a low to intermediate risk procedure and is medically optimized at this time, apprec cardio optimization  -will consult GI

## 2020-06-29 NOTE — PROGRESS NOTE ADULT - ATTENDING COMMENTS
Pt. seen and examined. Now has GI function. Sine yesterdays ct was suboptimal contrast didn't reach entire small bowel it was repeated this AM. Improvement noted, but there are still some distended small bowel loops, which will need to be followed.  I believe GI should be involved, since he may need capsule endoscopy or laparoscopy.

## 2020-06-30 ENCOUNTER — TRANSCRIPTION ENCOUNTER (OUTPATIENT)
Age: 85
End: 2020-06-30

## 2020-06-30 LAB
ANION GAP SERPL CALC-SCNC: 5 MMOL/L — SIGNIFICANT CHANGE UP (ref 5–17)
BUN SERPL-MCNC: 13 MG/DL — SIGNIFICANT CHANGE UP (ref 7–23)
CALCIUM SERPL-MCNC: 8.3 MG/DL — LOW (ref 8.5–10.1)
CHLORIDE SERPL-SCNC: 107 MMOL/L — SIGNIFICANT CHANGE UP (ref 96–108)
CO2 SERPL-SCNC: 26 MMOL/L — SIGNIFICANT CHANGE UP (ref 22–31)
CREAT SERPL-MCNC: 0.85 MG/DL — SIGNIFICANT CHANGE UP (ref 0.5–1.3)
GLUCOSE SERPL-MCNC: 133 MG/DL — HIGH (ref 70–99)
HCT VFR BLD CALC: 38.3 % — LOW (ref 39–50)
HGB BLD-MCNC: 13.5 G/DL — SIGNIFICANT CHANGE UP (ref 13–17)
MCHC RBC-ENTMCNC: 29.7 PG — SIGNIFICANT CHANGE UP (ref 27–34)
MCHC RBC-ENTMCNC: 35.2 GM/DL — SIGNIFICANT CHANGE UP (ref 32–36)
MCV RBC AUTO: 84.4 FL — SIGNIFICANT CHANGE UP (ref 80–100)
NRBC # BLD: 0 /100 WBCS — SIGNIFICANT CHANGE UP (ref 0–0)
PHOSPHATE SERPL-MCNC: 3.2 MG/DL — SIGNIFICANT CHANGE UP (ref 2.5–4.5)
PLATELET # BLD AUTO: 182 K/UL — SIGNIFICANT CHANGE UP (ref 150–400)
POTASSIUM SERPL-MCNC: 3.7 MMOL/L — SIGNIFICANT CHANGE UP (ref 3.5–5.3)
POTASSIUM SERPL-SCNC: 3.7 MMOL/L — SIGNIFICANT CHANGE UP (ref 3.5–5.3)
RBC # BLD: 4.54 M/UL — SIGNIFICANT CHANGE UP (ref 4.2–5.8)
RBC # FLD: 12.7 % — SIGNIFICANT CHANGE UP (ref 10.3–14.5)
SARS-COV-2 RNA SPEC QL NAA+PROBE: SIGNIFICANT CHANGE UP
SODIUM SERPL-SCNC: 138 MMOL/L — SIGNIFICANT CHANGE UP (ref 135–145)
WBC # BLD: 10.85 K/UL — HIGH (ref 3.8–10.5)
WBC # FLD AUTO: 10.85 K/UL — HIGH (ref 3.8–10.5)

## 2020-06-30 PROCEDURE — 99233 SBSQ HOSP IP/OBS HIGH 50: CPT

## 2020-06-30 PROCEDURE — 74018 RADEX ABDOMEN 1 VIEW: CPT | Mod: 26

## 2020-06-30 PROCEDURE — 74177 CT ABD & PELVIS W/CONTRAST: CPT | Mod: 26

## 2020-06-30 PROCEDURE — 99232 SBSQ HOSP IP/OBS MODERATE 35: CPT

## 2020-06-30 RX ORDER — MAGNESIUM HYDROXIDE 400 MG/1
30 TABLET, CHEWABLE ORAL ONCE
Refills: 0 | Status: DISCONTINUED | OUTPATIENT
Start: 2020-06-30 | End: 2020-06-30

## 2020-06-30 RX ORDER — RADIOPAQUE PVC MARKERS/BARIUM 24MARKERS
450 CAPSULE ORAL
Refills: 0 | Status: COMPLETED | OUTPATIENT
Start: 2020-06-30 | End: 2020-06-30

## 2020-06-30 RX ORDER — HEPARIN SODIUM 5000 [USP'U]/ML
5000 INJECTION INTRAVENOUS; SUBCUTANEOUS ONCE
Refills: 0 | Status: COMPLETED | OUTPATIENT
Start: 2020-06-30 | End: 2020-06-30

## 2020-06-30 RX ORDER — HEPARIN SODIUM 5000 [USP'U]/ML
5000 INJECTION INTRAVENOUS; SUBCUTANEOUS ONCE
Refills: 0 | Status: DISCONTINUED | OUTPATIENT
Start: 2020-06-30 | End: 2020-06-30

## 2020-06-30 RX ORDER — CEFOTETAN DISODIUM 1 G
2 VIAL (EA) INJECTION ONCE
Refills: 0 | Status: COMPLETED | OUTPATIENT
Start: 2020-07-01 | End: 2020-07-01

## 2020-06-30 RX ORDER — SENNA PLUS 8.6 MG/1
2 TABLET ORAL AT BEDTIME
Refills: 0 | Status: DISCONTINUED | OUTPATIENT
Start: 2020-06-30 | End: 2020-06-30

## 2020-06-30 RX ORDER — ONDANSETRON 8 MG/1
4 TABLET, FILM COATED ORAL ONCE
Refills: 0 | Status: COMPLETED | OUTPATIENT
Start: 2020-06-30 | End: 2020-06-30

## 2020-06-30 RX ADMIN — FAMOTIDINE 20 MILLIGRAM(S): 10 INJECTION INTRAVENOUS at 05:30

## 2020-06-30 RX ADMIN — HEPARIN SODIUM 5000 UNIT(S): 5000 INJECTION INTRAVENOUS; SUBCUTANEOUS at 17:56

## 2020-06-30 RX ADMIN — SODIUM CHLORIDE 50 MILLILITER(S): 9 INJECTION, SOLUTION INTRAVENOUS at 05:28

## 2020-06-30 RX ADMIN — ONDANSETRON 4 MILLIGRAM(S): 8 TABLET, FILM COATED ORAL at 19:57

## 2020-06-30 RX ADMIN — Medication 5 MILLIGRAM(S): at 17:56

## 2020-06-30 RX ADMIN — FINASTERIDE 5 MILLIGRAM(S): 5 TABLET, FILM COATED ORAL at 17:56

## 2020-06-30 RX ADMIN — Medication 450 MILLILITER(S): at 12:09

## 2020-06-30 RX ADMIN — TAMSULOSIN HYDROCHLORIDE 0.4 MILLIGRAM(S): 0.4 CAPSULE ORAL at 20:34

## 2020-06-30 RX ADMIN — FAMOTIDINE 20 MILLIGRAM(S): 10 INJECTION INTRAVENOUS at 17:57

## 2020-06-30 RX ADMIN — Medication 25 MILLIGRAM(S): at 17:56

## 2020-06-30 RX ADMIN — Medication 450 MILLILITER(S): at 14:28

## 2020-06-30 RX ADMIN — Medication 25 MILLIGRAM(S): at 05:27

## 2020-06-30 RX ADMIN — AMLODIPINE BESYLATE 10 MILLIGRAM(S): 2.5 TABLET ORAL at 05:27

## 2020-06-30 RX ADMIN — Medication 5 MILLIGRAM(S): at 05:30

## 2020-06-30 RX ADMIN — LOSARTAN POTASSIUM 100 MILLIGRAM(S): 100 TABLET, FILM COATED ORAL at 05:27

## 2020-06-30 RX ADMIN — ATORVASTATIN CALCIUM 10 MILLIGRAM(S): 80 TABLET, FILM COATED ORAL at 20:34

## 2020-06-30 NOTE — PROGRESS NOTE ADULT - SUBJECTIVE AND OBJECTIVE BOX
Patient is a 86y old  Male who presents with a chief complaint of SBO (30 Jun 2020 14:08)      INTERVAL HPI: Pt seen and examined. States he is still distended, passing some gas and very little stool. Denies any other acute complaints at this time.    OVERNIGHT EVENTS: none noted  T(F): 98.7 (06-30-20 @ 13:47), Max: 98.7 (06-30-20 @ 13:47)  HR: 68 (06-30-20 @ 13:47) (64 - 79)  BP: 150/78 (06-30-20 @ 13:47) (135/69 - 160/71)  RR: 20 (06-30-20 @ 13:47) (18 - 20)  SpO2: 91% (06-30-20 @ 13:47) (91% - 93%)  I&O's Summary    29 Jun 2020 07:01  -  30 Jun 2020 07:00  --------------------------------------------------------  IN: 0 mL / OUT: 400 mL / NET: -400 mL        Review of Systems:  Constitutional: denies fever, chills, sweating  HEENT: denies headache, dizziness, or lightheadedness  Respiratory: denies SOB, cough, or wheezing  Cardiovascular: denies CP, palpitations  Gastrointestinal: worsening distension, some gas but still has sensation of fulness, NO nausea, vomiting, diarrhea, denies bloody stools  Genitourinary: denies painful urination, increased frequency, urgency, or bloody urine  Skin/Breast: denies rashes or itching  Musculoskeletal: denies muscle aches, joint swelling, or muscle weakness  Neurologic: denies loss of sensation, numbness, or tingling ROS negative except as noted above	    Physical Exam:  	Gen: well appearing, NAD, elder  	HEENT: NCAT, PEERLA b/l, EOMI b/l, no conjunctival erythema  	Cardio: regular rate and rhythm, +s1s2, no murmurs, rubs, or gallops  	Pulm: CTA b/l, no wheezes, rales or rhonchi  	Abdomen: soft, more distended,slightly tender, hypoactive BS x4 quadrants, no guarding  	Extremities: no clubbing, cyanosis or edema, +2 pedal pulses  	Neuro: AAOx3, muscle strength grossly intact  Skin: warm and dry    LABS:                        13.5   10.85 )-----------( 182      ( 30 Jun 2020 07:40 )             38.3     06-30    138  |  107  |  13  ----------------------------<  133<H>  3.7   |  26  |  0.85    Ca    8.3<L>      30 Jun 2020 07:40  Phos  3.2     06-30  Mg     2.1     06-29    TPro  6.4  /  Alb  3.1<L>  /  TBili  1.1  /  DBili  x   /  AST  26  /  ALT  18  /  AlkPhos  70  06-29        CAPILLARY BLOOD GLUCOSE                  MEDICATIONS  (STANDING):  amLODIPine   Tablet 10 milliGRAM(s) Oral daily  aspirin 325 milliGRAM(s) Oral at bedtime  atorvastatin 10 milliGRAM(s) Oral at bedtime  famotidine Injectable 20 milliGRAM(s) IV Push every 12 hours  finasteride 5 milliGRAM(s) Oral daily  lactated ringers. 1000 milliLiter(s) (50 mL/Hr) IV Continuous <Continuous>  losartan 100 milliGRAM(s) Oral daily  metoprolol tartrate 25 milliGRAM(s) Oral two times a day  oxybutynin 5 milliGRAM(s) Oral two times a day  tamsulosin 0.4 milliGRAM(s) Oral at bedtime    MEDICATIONS  (PRN):  ondansetron Injectable 4 milliGRAM(s) IV Push every 6 hours PRN Nausea

## 2020-06-30 NOTE — PROGRESS NOTE ADULT - SUBJECTIVE AND OBJECTIVE BOX
[facility] -- Makayla Malik Grossman, Wachsman, Tejas Comer Savella, Goodger: Office # [phone]    Follow Up:  CAD    Subjective/Observations: Patient seen and examined. Patient awake and alert, resting comfortably in bed. No complaints of chest pain, SOB, LE edema, cough. No signs of orthopnea or PND.    REVIEW OF SYSTEMS: All review of systems is negative for eye, ENT, GI, , allergic, dermatologic, musculoskeletal and neurologic except as described above    PAST MEDICAL & SURGICAL HISTORY:  Pulmonary hypertension: moderate  COPD (chronic obstructive pulmonary disease): mild (no home oxygen)  Anxiety  CAD (coronary artery disease)  BPH (Benign Prostatic Hyperplasia)  Hyperlipemia  Hypertension  Detached retina, right: with repair  Bilateral cataracts: removed  H/O bilateral hip replacements    MEDICATIONS  (STANDING):  amLODIPine   Tablet 10 milliGRAM(s) Oral daily  aspirin 325 milliGRAM(s) Oral at bedtime  atorvastatin 10 milliGRAM(s) Oral at bedtime  barium sulfate 0.1% Suspension 450 milliLiter(s) Oral <User Schedule>  enoxaparin Injectable 40 milliGRAM(s) SubCutaneous daily  famotidine Injectable 20 milliGRAM(s) IV Push every 12 hours  finasteride 5 milliGRAM(s) Oral daily  lactated ringers. 1000 milliLiter(s) (50 mL/Hr) IV Continuous <Continuous>  losartan 100 milliGRAM(s) Oral daily  metoprolol tartrate 25 milliGRAM(s) Oral two times a day  oxybutynin 5 milliGRAM(s) Oral two times a day  senna 2 Tablet(s) Oral at bedtime  tamsulosin 0.4 milliGRAM(s) Oral at bedtime    MEDICATIONS  (PRN):  magnesium hydroxide Suspension 30 milliLiter(s) Oral once PRN Constipation  ondansetron Injectable 4 milliGRAM(s) IV Push every 6 hours PRN Nausea    Allergies  penicillin (Hives)    Vital Signs Last 24 Hrs  T(C): 36.9 (30 Jun 2020 05:00), Max: 36.9 (29 Jun 2020 21:19)  T(F): 98.5 (30 Jun 2020 05:00), Max: 98.5 (29 Jun 2020 21:19)  HR: 79 (30 Jun 2020 05:00) (64 - 79)  BP: 135/69 (30 Jun 2020 05:00) (135/69 - 160/71)  BP(mean): --  RR: 18 (30 Jun 2020 05:00) (17 - 18)  SpO2: 92% (30 Jun 2020 05:00) (92% - 95%)    I&O's Summary    29 Jun 2020 07:01  -  30 Jun 2020 07:00  --------------------------------------------------------  IN: 0 mL / OUT: 400 mL / NET: -400 mL    TELE: Not on telemetry   PHYSICAL EXAM:  Appearance: NAD, no distress, alert, Well developed   HEENT: Moist Mucous Membranes, Anicteric  Cardiovascular: Regular rate and rhythm, Normal S1 S2, No JVD, No murmurs, No rubs, gallops or clicks  Respiratory: Non-labored, Clear to auscultation, No rales, No rhonchi, No wheezing.   Gastrointestinal:  Soft, Non-tender, + BS  Neurologic: Non-focal  Skin: Warm and dry, No visible rashes or ulcers, No ecchymosis, No cyanosis  Musculoskeletal: No clubbing, No cyanosis, No joint swelling/tenderness  Psychiatry: Mood & affect appropriate  Lymph: No peripheral edema.     LABS: All Labs Reviewed:                        13.5   10.85 )-----------( 182      ( 30 Jun 2020 07:40 )             38.3                         14.0   7.09  )-----------( 192      ( 29 Jun 2020 08:24 )             39.7                         13.7   7.41  )-----------( 182      ( 28 Jun 2020 06:53 )             39.7     30 Jun 2020 07:40    138    |  107    |  13     ----------------------------<  133    3.7     |  26     |  0.85   29 Jun 2020 08:24    139    |  107    |  16     ----------------------------<  69     3.9     |  24     |  0.76   28 Jun 2020 06:53    140    |  107    |  24     ----------------------------<  72     3.2     |  28     |  0.93     Ca    8.3        30 Jun 2020 07:40  Ca    8.2        29 Jun 2020 08:24  Ca    8.1        28 Jun 2020 06:53  Phos  3.2       30 Jun 2020 07:40  Phos  2.3       29 Jun 2020 08:24  Mg     2.1       29 Jun 2020 08:24    TPro  6.4    /  Alb  3.1    /  TBili  1.1    /  DBili  x      /  AST  26     /  ALT  18     /  AlkPhos  70     29 Jun 2020 08:24  TPro  6.4    /  Alb  3.2    /  TBili  1.2    /  DBili  x      /  AST  24     /  ALT  17     /  AlkPhos  66     28 Jun 2020 06:5  Troponin I, Serum: <.015 ng/mL (06-27-20 @ 00:08)  Troponin I, Serum: .000 ng/mL (06-26-20 @ 14:33)    12 Lead ECG:   Ventricular Rate 71 BPM  Atrial Rate 71 BPM  P-R Interval 152 ms  QRS Duration 98 ms  Q-T Interval 400 ms  QTC Calculation(Bezet) 434 ms  P Axis 53 degrees  R Axis -46 degrees  T Axis 44 degrees  Diagnosis Line Normal sinus rhythm  Left anterior fascicular block  Abnormal ECG  No previous ECGs available  Confirmed by Eboni Martínez MD ([id number]) on 6/26/2020 4:41:12 PM (06-26-20 @ 14:12)

## 2020-06-30 NOTE — CONSULT NOTE ADULT - PROBLEM SELECTOR RECOMMENDATION 9
some signs of returning bowel function however CTE shows persistent SBO with transition point  no wall thickening or mass seen on CTE  NPO  iv fluid  case d/w with Dr. Simpson, plan for laparoscopy in am

## 2020-06-30 NOTE — PROGRESS NOTE ADULT - ATTENDING COMMENTS
Pt seen and examined. He offers no complaints and has GI function.  All ct's and AXR were reviewed with Dr. Queen and there is persistent dilation of small bowel. Will get ct enterography. Possibility of surgery was discussed with the pt and Dr. Villagomez.

## 2020-06-30 NOTE — PROGRESS NOTE ADULT - PROBLEM SELECTOR PLAN 1
-repeat ct abd shows cont sbo with partial obstruction with some improvement; ct enterography done today-pt will need lap expl  -apprec gen surg and GI collaboration  -cont npo with ivf  -monitor serial abd exams and xrays/imaginig as per gen surg  -monitor i/os  preop eval:  pt is considered an intermediate risk candidate for a low to intermediate risk procedure and is medically optimized at this time, apprec cardio optimization  -apprec GI recs

## 2020-06-30 NOTE — PROGRESS NOTE ADULT - SUBJECTIVE AND OBJECTIVE BOX
ANIYA GALE  [id number] 86y    GENERAL SURGERY FOLLOW UP/ DR. GALVAN    NO N/V, FEELS FULL/ BLOATED AFTER CT SCAN  STILL PASSING FLATUS, HAD BM BEFORE CT      Vital Signs Last 24 Hrs  T(C): 37.1 (30 Jun 2020 13:47), Max: 37.1 (30 Jun 2020 13:47)  T(F): 98.7 (30 Jun 2020 13:47), Max: 98.7 (30 Jun 2020 13:47)  HR: 68 (30 Jun 2020 13:47) (64 - 79)  BP: 150/78 (30 Jun 2020 13:47) (135/69 - 160/71)  BP(mean): --  RR: 20 (30 Jun 2020 13:47) (18 - 20)  SpO2: 91% (30 Jun 2020 13:47) (91% - 93%)                           13.5   10.85 )-----------( 182      ( 30 Jun 2020 07:40 )             38.3      06-30    138  |  107  |  13  ----------------------------<  133<H>  3.7   |  26  |  0.85    Ca    8.3<L>      30 Jun 2020 07:40  Phos  3.2     06-30  Mg     2.1     06-29    TPro  6.4  /  Alb  3.1<L>  /  TBili  1.1  /  DBili  x   /  AST  26  /  ALT  18  /  AlkPhos  70  06-29    CT Enterography w/ Oral Cont and w/ IV Cont (06.30.20 @ 13:00)                  CLINICAL INFORMATION:  Abdominal distention, follow-up small bowel obstruction.    PROCEDURE: CT enterography isperformed following the oral ministration of negative contrast and arterial and portal venous phase imaging.  Axial images are obtained at 3.75 mm intervals with coronal sagittal reformatted images.    COMPARISON: CT scans abdomen pelvis 6/29/2020 6/28/2020.    FINDINGS:  The stomach is moderately distended.  There is a small hiatal hernia.    There are persistent fluid-filled dilated small bowel loops. There is a transition in the distal small bowel anteriorly right lower quadrant, as seen on series 2, images 80 through 83 and series 602, images 33-35.  There is an abrupt angulation of the small bowel anteriorly at this level.  No intraluminal mass or focal bowel wall thickening is noted.  Previously administered enteric contrast has transited to the colon, including rectosigmoid colon.  There is mild mesenteric edema/fluid.    Colonic diverticulosis is noted without evidence of diverticulitis.  There is a normal-appearing appendix.    Other findings are stable from prior exam.    Impression:    CT findings compatible with partial distal small bowel obstruction with change in caliber anteriorly within the right lower quadrant.    AMARI CABALLERO M.D., ATTENDING RADIOLOGIST                          PLAN FOR LAPAROSCOPY POSSIBLE LAPAROTOMY, EBONY, POSSIBLE BOWEL RESECTION TOMORROW     NPO EXCEPT MEDICATION   LABS NOTED  TYPE AND SCREEN    COVID ORDERED   PLAN, PROCEDURE IN DETAILS DISCUSSED WITH PATIENT, ALL QUESTIONS ANSWERED

## 2020-06-30 NOTE — PROGRESS NOTE ADULT - ASSESSMENT
87 yo M PMHx of CAD s/p MI and stents to RCA and LAD 1997, COPD (on bipap at night), HTN, HLD, BPH presents to the ED with 2 days of abdominal pain and N/V. Found to have SBO    CAD with normal LV function  - Had a nuclear stress on 6/24  which showed small, mod fixed defect apex EF 68% which was unchanged from his previous test 06/2019.   - TTE 01/2020 EF 72%, mild LVH, mild AI/PI/TR, sev LAE, PASP 38-43mmHg  - No s/s acute ischemia or HF at this time  - Continue ASA  - Continue statin  - Continue metoprolol, losartan, Norvasc    HTN,   - mild LVH on TTE  - well controlled BP: 135/69 (06-30-20 @ 05:00) (135/69 - 160/71)  - Continue BB, ARB, CCB    Hyperlipidemia  - Continue statin     SBO  - no surgical intervention at this time per surgery, plan for ct enterography  - Improving GI function, now on full liquid diet   - per primary /surgical team    - Monitor and replete lytes, keep K>4, Mg>2.  - All other medical needs as per primary team.  - Other cardiovascular workup will depend on clinical course.  - Will continue to follow.    Sania Gillis, MS FNP, [facility]  Nurse Practitioner- Cardiology   Spectra #[phone]

## 2020-06-30 NOTE — PROGRESS NOTE ADULT - SUBJECTIVE AND OBJECTIVE BOX
ANIYA GALE  MRN-[id number] 86y    GENERAL SURGERY/ DR. GALVAN    NO FEVER, CHILLS, N/V  TOLERATING CLEAR LIQUID DIET  + FLATUS, + BM ( HARD STOOL )  VOIDING       MEDICATIONS  (STANDING):    amLODIPine   Tablet 10 milliGRAM(s) Oral daily  aspirin 325 milliGRAM(s) Oral at bedtime  atorvastatin 10 milliGRAM(s) Oral at bedtime  enoxaparin Injectable 40 milliGRAM(s) SubCutaneous daily  famotidine Injectable 20 milliGRAM(s) IV Push every 12 hours  finasteride 5 milliGRAM(s) Oral daily  lactated ringers. 1000 milliLiter(s) (50 mL/Hr) IV Continuous <Continuous>  losartan 100 milliGRAM(s) Oral daily  metoprolol tartrate 25 milliGRAM(s) Oral two times a day  oxybutynin 5 milliGRAM(s) Oral two times a day  tamsulosin 0.4 milliGRAM(s) Oral at bedtime    MEDICATIONS  (PRN):    ondansetron Injectable 4 milliGRAM(s) IV Push every 6 hours PRN Nausea      Vital Signs Last 24 Hrs  T(C): 36.9 (30 Jun 2020 05:00), Max: 36.9 (29 Jun 2020 21:19)  T(F): 98.5 (30 Jun 2020 05:00), Max: 98.5 (29 Jun 2020 21:19)  HR: 79 (30 Jun 2020 05:00) (64 - 79)  BP: 135/69 (30 Jun 2020 05:00) (135/69 - 160/71)  BP(mean): --  RR: 18 (30 Jun 2020 05:00) (17 - 18)  SpO2: 92% (30 Jun 2020 05:00) (92% - 95%)      06-29-20 @ 07:01  -  06-30-20 @ 07:00  --------------------------------------------------------  IN: 0 mL / OUT: 400 mL / NET: -400 mL      LUNGS: CLEAR TO AUSCULTATION , NO W/R/R  ABDOMEN: PROTUBERANT. + BS, SOFT, NON TENDER TO PALPATION   EXTREMITY:  NO CALF TENDERNESS                             14.0   7.09  )-----------( 192      ( 29 Jun 2020 08:24 )             39.7      06-29    139  |  107  |  16  ----------------------------<  69<L>  3.9   |  24  |  0.76    Ca    8.2<L>      29 Jun 2020 08:24  Phos  2.3     06-29  Mg     2.1     06-29    TPro  6.4  /  Alb  3.1<L>  /  TBili  1.1  /  DBili  x   /  AST  26  /  ALT  18  /  AlkPhos  70  06-29                        ASSESSMENT &  PLAN    RESOLVING SMALL BOWEL OBSTRUCTION, HAS MORE GI FUNCTION    ADVANCE DIET TO FULL LIQUID   WILL BENEFIT FROM STOOL SOFTENER  SURGICAL TEAM WILL REACH OUT TO UROLOGY AGAIN FOR AN UPDATE  MEDICAL AND CARDIOLOGY F/U NOTED   MEDICAL MANAGEMENT AS PER PRIMARY TEAM ANIYA GALE  [id number] 86y    GENERAL SURGERY/ DR. GALVAN    NO FEVER, CHILLS, N/V  TOLERATING CLEAR LIQUID DIET  + FLATUS, + BM ( HARD STOOL )  VOIDING       MEDICATIONS  (STANDING):    amLODIPine   Tablet 10 milliGRAM(s) Oral daily  aspirin 325 milliGRAM(s) Oral at bedtime  atorvastatin 10 milliGRAM(s) Oral at bedtime  enoxaparin Injectable 40 milliGRAM(s) SubCutaneous daily  famotidine Injectable 20 milliGRAM(s) IV Push every 12 hours  finasteride 5 milliGRAM(s) Oral daily  lactated ringers. 1000 milliLiter(s) (50 mL/Hr) IV Continuous <Continuous>  losartan 100 milliGRAM(s) Oral daily  metoprolol tartrate 25 milliGRAM(s) Oral two times a day  oxybutynin 5 milliGRAM(s) Oral two times a day  tamsulosin 0.4 milliGRAM(s) Oral at bedtime    MEDICATIONS  (PRN):    ondansetron Injectable 4 milliGRAM(s) IV Push every 6 hours PRN Nausea      Vital Signs Last 24 Hrs  T(C): 36.9 (30 Jun 2020 05:00), Max: 36.9 (29 Jun 2020 21:19)  T(F): 98.5 (30 Jun 2020 05:00), Max: 98.5 (29 Jun 2020 21:19)  HR: 79 (30 Jun 2020 05:00) (64 - 79)  BP: 135/69 (30 Jun 2020 05:00) (135/69 - 160/71)  BP(mean): --  RR: 18 (30 Jun 2020 05:00) (17 - 18)  SpO2: 92% (30 Jun 2020 05:00) (92% - 95%)      06-29-20 @ 07:01  -  06-30-20 @ 07:00  --------------------------------------------------------  IN: 0 mL / OUT: 400 mL / NET: -400 mL      LUNGS: CLEAR TO AUSCULTATION , NO W/R/R  ABDOMEN: PROTUBERANT. + BS, SOFT, NON TENDER TO PALPATION   EXTREMITY:  NO CALF TENDERNESS                             14.0   7.09  )-----------( 192      ( 29 Jun 2020 08:24 )             39.7      06-29    139  |  107  |  16  ----------------------------<  69<L>  3.9   |  24  |  0.76    Ca    8.2<L>      29 Jun 2020 08:24  Phos  2.3     06-29  Mg     2.1     06-29    TPro  6.4  /  Alb  3.1<L>  /  TBili  1.1  /  DBili  x   /  AST  26  /  ALT  18  /  AlkPhos  70  06-29                        ASSESSMENT &  PLAN    RESOLVING SMALL BOWEL OBSTRUCTION, HAS MORE GI FUNCTION    ADVANCE DIET TO FULL LIQUID   WILL BENEFIT FROM STOOL SOFTENER  F/U ABDOMINAL X-RAY   SURGICAL TEAM WILL REACH OUT TO UROLOGY AGAIN FOR AN UPDATE  MEDICAL AND CARDIOLOGY F/U NOTED   MEDICAL MANAGEMENT AS PER PRIMARY TEAM

## 2020-06-30 NOTE — PROGRESS NOTE ADULT - PROBLEM SELECTOR PLAN 2
-chronic, stable  nuclear stress on 6/24 - unchanged from previous as per patient  -EKG: NSR with LAFB  -stop 325mg asa today, restart when ok with surger  -apprec cardio consults, apprec cardio optimization for OR tomorrow, expl lap

## 2020-07-01 ENCOUNTER — TRANSCRIPTION ENCOUNTER (OUTPATIENT)
Age: 85
End: 2020-07-01

## 2020-07-01 DIAGNOSIS — N39.41 URGE INCONTINENCE: ICD-10-CM

## 2020-07-01 DIAGNOSIS — N40.1 BENIGN PROSTATIC HYPERPLASIA WITH LOWER URINARY TRACT SYMPTOMS: ICD-10-CM

## 2020-07-01 LAB
ANION GAP SERPL CALC-SCNC: 4 MMOL/L — LOW (ref 5–17)
BUN SERPL-MCNC: 13 MG/DL — SIGNIFICANT CHANGE UP (ref 7–23)
CALCIUM SERPL-MCNC: 8 MG/DL — LOW (ref 8.5–10.1)
CHLORIDE SERPL-SCNC: 106 MMOL/L — SIGNIFICANT CHANGE UP (ref 96–108)
CO2 SERPL-SCNC: 29 MMOL/L — SIGNIFICANT CHANGE UP (ref 22–31)
CREAT SERPL-MCNC: 0.82 MG/DL — SIGNIFICANT CHANGE UP (ref 0.5–1.3)
GLUCOSE SERPL-MCNC: 98 MG/DL — SIGNIFICANT CHANGE UP (ref 70–99)
HCT VFR BLD CALC: 37.1 % — LOW (ref 39–50)
HGB BLD-MCNC: 13.2 G/DL — SIGNIFICANT CHANGE UP (ref 13–17)
MCHC RBC-ENTMCNC: 30.3 PG — SIGNIFICANT CHANGE UP (ref 27–34)
MCHC RBC-ENTMCNC: 35.6 GM/DL — SIGNIFICANT CHANGE UP (ref 32–36)
MCV RBC AUTO: 85.1 FL — SIGNIFICANT CHANGE UP (ref 80–100)
NRBC # BLD: 0 /100 WBCS — SIGNIFICANT CHANGE UP (ref 0–0)
PLATELET # BLD AUTO: 184 K/UL — SIGNIFICANT CHANGE UP (ref 150–400)
POTASSIUM SERPL-MCNC: 3.3 MMOL/L — LOW (ref 3.5–5.3)
POTASSIUM SERPL-SCNC: 3.3 MMOL/L — LOW (ref 3.5–5.3)
RBC # BLD: 4.36 M/UL — SIGNIFICANT CHANGE UP (ref 4.2–5.8)
RBC # FLD: 12.8 % — SIGNIFICANT CHANGE UP (ref 10.3–14.5)
SODIUM SERPL-SCNC: 139 MMOL/L — SIGNIFICANT CHANGE UP (ref 135–145)
WBC # BLD: 8.68 K/UL — SIGNIFICANT CHANGE UP (ref 3.8–10.5)
WBC # FLD AUTO: 8.68 K/UL — SIGNIFICANT CHANGE UP (ref 3.8–10.5)

## 2020-07-01 PROCEDURE — 99233 SBSQ HOSP IP/OBS HIGH 50: CPT

## 2020-07-01 PROCEDURE — 99232 SBSQ HOSP IP/OBS MODERATE 35: CPT

## 2020-07-01 PROCEDURE — 49320 DIAG LAPARO SEPARATE PROC: CPT | Mod: AS

## 2020-07-01 PROCEDURE — 49320 DIAG LAPARO SEPARATE PROC: CPT

## 2020-07-01 RX ORDER — SODIUM CHLORIDE 9 MG/ML
1000 INJECTION, SOLUTION INTRAVENOUS
Refills: 0 | Status: DISCONTINUED | OUTPATIENT
Start: 2020-07-01 | End: 2020-07-01

## 2020-07-01 RX ORDER — ONDANSETRON 8 MG/1
4 TABLET, FILM COATED ORAL EVERY 6 HOURS
Refills: 0 | Status: DISCONTINUED | OUTPATIENT
Start: 2020-07-01 | End: 2020-07-06

## 2020-07-01 RX ORDER — ONDANSETRON 8 MG/1
4 TABLET, FILM COATED ORAL ONCE
Refills: 0 | Status: DISCONTINUED | OUTPATIENT
Start: 2020-07-01 | End: 2020-07-01

## 2020-07-01 RX ORDER — SODIUM CHLORIDE 9 MG/ML
1000 INJECTION, SOLUTION INTRAVENOUS
Refills: 0 | Status: DISCONTINUED | OUTPATIENT
Start: 2020-07-01 | End: 2020-07-03

## 2020-07-01 RX ORDER — TAMSULOSIN HYDROCHLORIDE 0.4 MG/1
0.4 CAPSULE ORAL AT BEDTIME
Refills: 0 | Status: DISCONTINUED | OUTPATIENT
Start: 2020-07-01 | End: 2020-07-06

## 2020-07-01 RX ORDER — ATORVASTATIN CALCIUM 80 MG/1
10 TABLET, FILM COATED ORAL AT BEDTIME
Refills: 0 | Status: DISCONTINUED | OUTPATIENT
Start: 2020-07-01 | End: 2020-07-06

## 2020-07-01 RX ORDER — METOPROLOL TARTRATE 50 MG
25 TABLET ORAL
Refills: 0 | Status: DISCONTINUED | OUTPATIENT
Start: 2020-07-01 | End: 2020-07-06

## 2020-07-01 RX ORDER — HYDROMORPHONE HYDROCHLORIDE 2 MG/ML
0.5 INJECTION INTRAMUSCULAR; INTRAVENOUS; SUBCUTANEOUS
Refills: 0 | Status: DISCONTINUED | OUTPATIENT
Start: 2020-07-01 | End: 2020-07-01

## 2020-07-01 RX ORDER — LOSARTAN POTASSIUM 100 MG/1
100 TABLET, FILM COATED ORAL DAILY
Refills: 0 | Status: DISCONTINUED | OUTPATIENT
Start: 2020-07-01 | End: 2020-07-06

## 2020-07-01 RX ORDER — FINASTERIDE 5 MG/1
5 TABLET, FILM COATED ORAL DAILY
Refills: 0 | Status: DISCONTINUED | OUTPATIENT
Start: 2020-07-01 | End: 2020-07-06

## 2020-07-01 RX ORDER — AMLODIPINE BESYLATE 2.5 MG/1
10 TABLET ORAL DAILY
Refills: 0 | Status: DISCONTINUED | OUTPATIENT
Start: 2020-07-01 | End: 2020-07-06

## 2020-07-01 RX ORDER — FAMOTIDINE 10 MG/ML
20 INJECTION INTRAVENOUS EVERY 12 HOURS
Refills: 0 | Status: DISCONTINUED | OUTPATIENT
Start: 2020-07-01 | End: 2020-07-06

## 2020-07-01 RX ORDER — POTASSIUM CHLORIDE 20 MEQ
20 PACKET (EA) ORAL ONCE
Refills: 0 | Status: COMPLETED | OUTPATIENT
Start: 2020-07-01 | End: 2020-07-01

## 2020-07-01 RX ORDER — POTASSIUM CHLORIDE 20 MEQ
10 PACKET (EA) ORAL
Refills: 0 | Status: DISCONTINUED | OUTPATIENT
Start: 2020-07-01 | End: 2020-07-01

## 2020-07-01 RX ADMIN — FINASTERIDE 5 MILLIGRAM(S): 5 TABLET, FILM COATED ORAL at 16:23

## 2020-07-01 RX ADMIN — SODIUM CHLORIDE 75 MILLILITER(S): 9 INJECTION, SOLUTION INTRAVENOUS at 20:20

## 2020-07-01 RX ADMIN — Medication 100 GRAM(S): at 10:52

## 2020-07-01 RX ADMIN — Medication 20 MILLIEQUIVALENT(S): at 16:22

## 2020-07-01 RX ADMIN — AMLODIPINE BESYLATE 10 MILLIGRAM(S): 2.5 TABLET ORAL at 05:06

## 2020-07-01 RX ADMIN — TAMSULOSIN HYDROCHLORIDE 0.4 MILLIGRAM(S): 0.4 CAPSULE ORAL at 21:25

## 2020-07-01 RX ADMIN — ATORVASTATIN CALCIUM 10 MILLIGRAM(S): 80 TABLET, FILM COATED ORAL at 21:25

## 2020-07-01 RX ADMIN — Medication 25 MILLIGRAM(S): at 05:06

## 2020-07-01 RX ADMIN — FAMOTIDINE 20 MILLIGRAM(S): 10 INJECTION INTRAVENOUS at 05:06

## 2020-07-01 RX ADMIN — LOSARTAN POTASSIUM 100 MILLIGRAM(S): 100 TABLET, FILM COATED ORAL at 05:06

## 2020-07-01 RX ADMIN — Medication 25 MILLIGRAM(S): at 18:14

## 2020-07-01 RX ADMIN — Medication 5 MILLIGRAM(S): at 05:06

## 2020-07-01 RX ADMIN — SODIUM CHLORIDE 75 MILLILITER(S): 9 INJECTION, SOLUTION INTRAVENOUS at 12:29

## 2020-07-01 RX ADMIN — FAMOTIDINE 20 MILLIGRAM(S): 10 INJECTION INTRAVENOUS at 18:13

## 2020-07-01 NOTE — PROGRESS NOTE ADULT - SUBJECTIVE AND OBJECTIVE BOX
CC/F/U for: SBO    HPI:  87 yo M PMHx of CAD s/p MI and stents to RCA and LAD, COPD (on bipap at night), HTN, HLD, BPH presents to the ED with 2 days of abdominal pain and N/V. Patient states that for the past 2 days he has been having epigastric burning with some lower abdominal pain. Has not had anything PO in the past two days due to nausea and vomiting. States that had 6-7 episodes of emesis today. States that it is brown in color. Also admits to some loose stools since yesterday. States that his symptoms have improved following the pepcid and zofran. Admits to some chest discomfort and pain in his shoulders b/l. Admits to some mild shortness of breath, which is chronic. Had a nuclear stress on 6/24 which was unchanged from his previous test. Denies fever, chills, headache, dizziness, cough.    IN THE ED:  Temp 98.7  F , HR 70 , BP  153/80  ,RR 18 , SpO2 98% on RA  S/P pepcid and zofran, 1L IVF  EKG: NSR, LAFB  Labs significant for WBC 13.81, BUN 29, Glucose 145, bilirubin 1.4, toponin .000  CT A/P: IMPRESSION: Distal small bowel obstruction. Mild interloop edema/fluid.    Surgical team saw pt in the ED, did not recommend NG tube or any other intervention for now. (26 Jun 2020 21:48)        INTERVAL HPI/OVERNIGHT EVENTS:  Pt seen and examined at bedside.     Allergies/Intolerance: penicillin (Hives)      MEDICATIONS  (STANDING):  amLODIPine   Tablet 10 milliGRAM(s) Oral daily  atorvastatin 10 milliGRAM(s) Oral at bedtime  famotidine Injectable 20 milliGRAM(s) IV Push every 12 hours  finasteride 5 milliGRAM(s) Oral daily  lactated ringers. 1000 milliLiter(s) (50 mL/Hr) IV Continuous <Continuous>  losartan 100 milliGRAM(s) Oral daily  metoprolol tartrate 25 milliGRAM(s) Oral two times a day  potassium chloride    Tablet ER 20 milliEquivalent(s) Oral once  potassium chloride  10 mEq/100 mL IVPB 10 milliEquivalent(s) IV Intermittent every 1 hour  tamsulosin 0.4 milliGRAM(s) Oral at bedtime    MEDICATIONS  (PRN):  aluminum hydroxide/magnesium hydroxide/simethicone Suspension 30 milliLiter(s) Oral every 4 hours PRN Dyspepsia  ondansetron Injectable 4 milliGRAM(s) IV Push every 6 hours PRN Nausea        ROS: as above; all other systems reviewed and wnl      PHYSICAL EXAMINATION:  Vital Signs Last 24 Hrs  T(C): 37.3 (01 Jul 2020 10:36), Max: 37.3 (01 Jul 2020 10:36)  T(F): 99.1 (01 Jul 2020 10:36), Max: 99.1 (01 Jul 2020 10:36)  HR: 65 (01 Jul 2020 10:36) (58 - 78)  BP: 146/87 (01 Jul 2020 10:36) (119/73 - 150/78)  RR: 13 (01 Jul 2020 10:36) (13 - 20)  SpO2: 96% (01 Jul 2020 10:36) (90% - 96%)    GENERAL: NAD  HEENT: mucous membranes dry  CHEST: respirations unlabored  HEART: HR 60s  ABDOMEN: soft, ND, NT   EXTREMITIES:  no edema b/l LEs, no calf tenderness  NEURO: awake, alert, interactive; moves all extremities      LABS:                        13.2   8.68  )-----------( 184      ( 01 Jul 2020 07:39 )             37.1     07-01    139  |  106  |  13  ----------------------------<  98  3.3<L>   |  29  |  0.82    Ca    8.0<L>      01 Jul 2020 07:39  Phos  3.2     06-30 CC/F/U for: SBO    HPI:  85 yo M PMHx of CAD s/p MI and stents to RCA and LAD, COPD (on bipap at night), HTN, HLD, BPH presents to the ED with 2 days of abdominal pain and N/V. Patient states that for the past 2 days he has been having epigastric burning with some lower abdominal pain. Has not had anything PO in the past two days due to nausea and vomiting. States that had 6-7 episodes of emesis today. States that it is brown in color. Also admits to some loose stools since yesterday. States that his symptoms have improved following the pepcid and zofran. Admits to some chest discomfort and pain in his shoulders b/l. Admits to some mild shortness of breath, which is chronic. Had a nuclear stress on 6/24 which was unchanged from his previous test. Denies fever, chills, headache, dizziness, cough.    IN THE ED:  Temp 98.7  F , HR 70 , BP  153/80  ,RR 18 , SpO2 98% on RA  S/P pepcid and zofran, 1L IVF  EKG: NSR, LAFB  Labs significant for WBC 13.81, BUN 29, Glucose 145, bilirubin 1.4, toponin .000  CT A/P: IMPRESSION: Distal small bowel obstruction. Mild interloop edema/fluid.    Surgical team saw pt in the ED, did not recommend NG tube or any other intervention for now. (26 Jun 2020 21:48)    INTERVAL HPI/OVERNIGHT EVENTS:  Pt seen and examined at bedside - OR today for exlap.     Allergies/Intolerance: penicillin (Hives)      MEDICATIONS  (STANDING):  amLODIPine   Tablet 10 milliGRAM(s) Oral daily  atorvastatin 10 milliGRAM(s) Oral at bedtime  famotidine Injectable 20 milliGRAM(s) IV Push every 12 hours  finasteride 5 milliGRAM(s) Oral daily  lactated ringers. 1000 milliLiter(s) (50 mL/Hr) IV Continuous <Continuous>  losartan 100 milliGRAM(s) Oral daily  metoprolol tartrate 25 milliGRAM(s) Oral two times a day  potassium chloride    Tablet ER 20 milliEquivalent(s) Oral once  potassium chloride  10 mEq/100 mL IVPB 10 milliEquivalent(s) IV Intermittent every 1 hour  tamsulosin 0.4 milliGRAM(s) Oral at bedtime    MEDICATIONS  (PRN):  aluminum hydroxide/magnesium hydroxide/simethicone Suspension 30 milliLiter(s) Oral every 4 hours PRN Dyspepsia  ondansetron Injectable 4 milliGRAM(s) IV Push every 6 hours PRN Nausea      ROS: as above; all other systems reviewed and wnl      PHYSICAL EXAMINATION:  Vital Signs Last 24 Hrs  T(C): 37.3 (01 Jul 2020 10:36), Max: 37.3 (01 Jul 2020 10:36)  T(F): 99.1 (01 Jul 2020 10:36), Max: 99.1 (01 Jul 2020 10:36)  HR: 65 (01 Jul 2020 10:36) (58 - 78)  BP: 146/87 (01 Jul 2020 10:36) (119/73 - 150/78)  RR: 13 (01 Jul 2020 10:36) (13 - 20)  SpO2: 96% (01 Jul 2020 10:36) (90% - 96%)    GENERAL: elderly male, NAD  HEENT: mucous membranes dry  CHEST: respirations unlabored  HEART: HR 60s  ABDOMEN: surgical wound bandaged  EXTREMITIES:  no edema b/l LEs, no calf tenderness  NEURO: awake, alert, interactive      LABS:                        13.2   8.68  )-----------( 184      ( 01 Jul 2020 07:39 )             37.1     07-01    139  |  106  |  13  ----------------------------<  98  3.3<L>   |  29  |  0.82    Ca    8.0<L>      01 Jul 2020 07:39  Phos  3.2     06-30 CC/F/U for: SBO vs ileus    HPI:  87 yo M PMHx of CAD s/p MI and stents to RCA and LAD, COPD (on bipap at night), HTN, HLD, BPH presents to the ED with 2 days of abdominal pain and N/V. Patient states that for the past 2 days he has been having epigastric burning with some lower abdominal pain. Has not had anything PO in the past two days due to nausea and vomiting. States that had 6-7 episodes of emesis today. States that it is brown in color. Also admits to some loose stools since yesterday. States that his symptoms have improved following the pepcid and zofran. Admits to some chest discomfort and pain in his shoulders b/l. Admits to some mild shortness of breath, which is chronic. Had a nuclear stress on 6/24 which was unchanged from his previous test. Denies fever, chills, headache, dizziness, cough.    IN THE ED:  Temp 98.7  F , HR 70 , BP  153/80  ,RR 18 , SpO2 98% on RA  S/P pepcid and zofran, 1L IVF  EKG: NSR, LAFB  Labs significant for WBC 13.81, BUN 29, Glucose 145, bilirubin 1.4, toponin .000  CT A/P: IMPRESSION: Distal small bowel obstruction. Mild interloop edema/fluid.    Surgical team saw pt in the ED, did not recommend NG tube or any other intervention for now. (26 Jun 2020 21:48)    INTERVAL HPI/OVERNIGHT EVENTS:  Pt seen and examined at bedside - OR today for exlap - no antonio obstruction - c/f ileus; in Recovery room, pt currently postop, and denies pain, n/v    Allergies/Intolerance: penicillin (Hives)      MEDICATIONS  (STANDING):  amLODIPine   Tablet 10 milliGRAM(s) Oral daily  atorvastatin 10 milliGRAM(s) Oral at bedtime  famotidine Injectable 20 milliGRAM(s) IV Push every 12 hours  finasteride 5 milliGRAM(s) Oral daily  lactated ringers. 1000 milliLiter(s) (50 mL/Hr) IV Continuous <Continuous>  losartan 100 milliGRAM(s) Oral daily  metoprolol tartrate 25 milliGRAM(s) Oral two times a day  potassium chloride    Tablet ER 20 milliEquivalent(s) Oral once  potassium chloride  10 mEq/100 mL IVPB 10 milliEquivalent(s) IV Intermittent every 1 hour  tamsulosin 0.4 milliGRAM(s) Oral at bedtime    MEDICATIONS  (PRN):  aluminum hydroxide/magnesium hydroxide/simethicone Suspension 30 milliLiter(s) Oral every 4 hours PRN Dyspepsia  ondansetron Injectable 4 milliGRAM(s) IV Push every 6 hours PRN Nausea      ROS: as above; all other systems reviewed and wnl      PHYSICAL EXAMINATION:  Vital Signs Last 24 Hrs  T(C): 37.3 (01 Jul 2020 10:36), Max: 37.3 (01 Jul 2020 10:36)  T(F): 99.1 (01 Jul 2020 10:36), Max: 99.1 (01 Jul 2020 10:36)  HR: 65 (01 Jul 2020 10:36) (58 - 78)  BP: 146/87 (01 Jul 2020 10:36) (119/73 - 150/78)  RR: 13 (01 Jul 2020 10:36) (13 - 20)  SpO2: 96% (01 Jul 2020 10:36) (90% - 96%)    GENERAL: elderly male, NAD  HEENT: mucous membranes dry  CHEST: respirations unlabored  HEART: HR 60s  ABDOMEN: laparoscopic surgical wound w/wound closures in place; marked distention  EXTREMITIES:  no edema b/l LEs, no calf tenderness  NEURO: awake, alert, interactive; moves all extrem      LABS:                        13.2   8.68  )-----------( 184      ( 01 Jul 2020 07:39 )             37.1     07-01    139  |  106  |  13  ----------------------------<  98  3.3<L>   |  29  |  0.82    Ca    8.0<L>      01 Jul 2020 07:39  Phos  3.2     06-30

## 2020-07-01 NOTE — DISCHARGE NOTE PROVIDER - HOSPITAL COURSE
87 yo M PMHx of CAD s/p MI and stents to RCA and LAD, COPD (on bipap at night), HTN, HLD, BPH presents to the ED with 2 days of abdominal pain and N/V. Admit for SBO. Pt was seen by general surgery Dr Simpson and GI Dr Frazier who collaborated in patient's care. He was managed conservatively with serial imaging and exam without resolution of distension and symptoms. It was decided after a CT enterography to take patient to OR for exploratory lap... 85 yo M PMHx of CAD s/p MI and stents to RCA and LAD, COPD (on bipap at night), HTN, HLD, BPH presents to the ED with 2 days of abdominal pain and N/V. Admit for SBO. Pt was seen by general surgery Dr Simpson and GI Dr Frazier who collaborated in patient's care. He was managed conservatively with serial imaging and exam without resolution of distension and symptoms. It was decided after a CT enterography to take patient to OR for exploratory lap, s/p exp lap, SBO resolved , tolerated po diet, had BM.         spent 49 min , notified pcp     PHYSICAL EXAM        Constitutional: NAD, well-groomed, well-developed    HEENT: PERRLA, EOMI, Normal Hearing, MMM    Neck: No LAD, No JVD    Back: Normal spine flexure, No CVA tenderness    Respiratory: CTAB/L     Cardiovascular: S1 and S2, RRR, no M/G/R    Gastrointestinal: BS+, soft, NT/ND    Extremities: No peripheral edema    Vascular: 2+ peripheral pulses    Neurological: A/O x 3, no focal deficits    Skin: No rashes

## 2020-07-01 NOTE — PROGRESS NOTE ADULT - ASSESSMENT
87 yo M PMHx of CAD s/p MI and stents to RCA and LAD 1997, COPD (on bipap at night), HTN, HLD, BPH presents to the ED with 2 days of abdominal pain and N/V. Found to have SBO    CAD with normal LV function  - Had a nuclear stress on 6/24  which showed small, mod fixed defect apex EF 68% which was unchanged from his previous test 06/2019.   - TTE 01/2020 EF 72%, mild LVH, mild AI/PI/TR, sev LAE, PASP 38-43mmHg  - No s/s acute ischemia or HF at this time  - Continue ASA  - Continue statin  - Continue metoprolol, losartan, Norvasc    HTN,   - mild LVH on TTE  - well controlled   - BP: 134/75 (07-01-20 @ 04:50) (134/75 - 150/78)  - Continue BB, ARB, CCB    Hyperlipidemia  - Continue statin     SBO  - PLAN FOR LAPAROSCOPY POSSIBLE LAPAROTOMY, EBONY, POSSIBLE BOWEL RESECTION today  - Optimized for the OR from a cardiac point of view with no evidence of active ischemic heart disease, decompensated heart failure, severe obstructive valvular disease, or uncontrolled arrhythmia.  - BP well controlled, monitor routine hemodynamic   - per primary /surgical team    - Monitor and replete lytes, keep K>4, Mg>2.  - All other medical needs as per primary team.  - Other cardiovascular workup will depend on clinical course.  - Will continue to follow.    Main Jordan DNP, ANP-c  Cardiology   Spectra #[phone]  [phone]

## 2020-07-01 NOTE — DISCHARGE NOTE PROVIDER - CARE PROVIDER_API CALL
ABE CAMPBELL  [id number]  [address] [place] [address]  Phone: [phone]  Fax: [phone]  Follow Up Time:     Roman Frazier  GASTROENTEROLOGY  [address] [place] [id number]  Phone: [phone]  Fax: [phone]  Follow Up Time:

## 2020-07-01 NOTE — PROGRESS NOTE ADULT - SUBJECTIVE AND OBJECTIVE BOX
[facility] -- Makayla Malik, Tonia, Denise, Tejas Comer Savella  Office # [phone]      Follow Up:    CAD, Cardiac clearance   Subjective/Observations:   No events overnight resting comfortably in bed.  No complaints of chest pain, dyspnea, or palpitations reported. No signs of orthopnea or PND.     REVIEW OF SYSTEMS: All other review of systems is negative unless indicated above    PAST MEDICAL & SURGICAL HISTORY:  Pulmonary hypertension: moderate  COPD (chronic obstructive pulmonary disease): mild (no home oxygen)  Anxiety  CAD (coronary artery disease)  BPH (Benign Prostatic Hyperplasia)  Hyperlipemia  Hypertension  Detached retina, right: with repair  Bilateral cataracts: removed  H/O bilateral hip replacements      MEDICATIONS  (STANDING):  amLODIPine   Tablet 10 milliGRAM(s) Oral daily  atorvastatin 10 milliGRAM(s) Oral at bedtime  cefoTEtan  IVPB 2 Gram(s) IV Intermittent once  famotidine Injectable 20 milliGRAM(s) IV Push every 12 hours  finasteride 5 milliGRAM(s) Oral daily  lactated ringers. 1000 milliLiter(s) (50 mL/Hr) IV Continuous <Continuous>  losartan 100 milliGRAM(s) Oral daily  metoprolol tartrate 25 milliGRAM(s) Oral two times a day  oxybutynin 5 milliGRAM(s) Oral two times a day  tamsulosin 0.4 milliGRAM(s) Oral at bedtime    MEDICATIONS  (PRN):  aluminum hydroxide/magnesium hydroxide/simethicone Suspension 30 milliLiter(s) Oral every 4 hours PRN Dyspepsia  ondansetron Injectable 4 milliGRAM(s) IV Push every 6 hours PRN Nausea      Allergies    penicillin (Hives)    Intolerances        Vital Signs Last 24 Hrs  T(C): 36.9 (01 Jul 2020 04:50), Max: 37.1 (30 Jun 2020 13:47)  T(F): 98.4 (01 Jul 2020 04:50), Max: 98.7 (30 Jun 2020 13:47)  HR: 67 (01 Jul 2020 04:50) (67 - 78)  BP: 134/75 (01 Jul 2020 04:50) (134/75 - 150/78)  BP(mean): --  RR: 20 (01 Jul 2020 04:50) (20 - 20)  SpO2: 93% (01 Jul 2020 04:50) (90% - 93%)    I&O's Summary    29 Jun 2020 07:01  -  30 Jun 2020 07:00  --------------------------------------------------------  IN: 0 mL / OUT: 400 mL / NET: -400 mL    30 Jun 2020 07:01  -  01 Jul 2020 06:22  --------------------------------------------------------  IN: 600 mL / OUT: 0 mL / NET: 600 mL          PHYSICAL EXAM:  TELE: Off tele   Constitutional: NAD, awake and alert, well-developed  HEENT: Moist Mucous Membranes, Anicteric  Pulmonary: Non-labored, breath sounds are clear bilaterally, No wheezing, crackles or rhonchi  Cardiovascular: Regular, S1 and S2 nl, No murmurs, rubs, gallops or clicks  Gastrointestinal: Bowel Sounds present, soft, nontender.   Lymph: No lymphadenopathy. No peripheral edema.  Skin: No visible rashes or ulcers.  Psych:  Mood & affect appropriate    LABS: All Labs Reviewed:                        13.5   10.85 )-----------( 182      ( 30 Jun 2020 07:40 )             38.3                         14.0   7.09  )-----------( 192      ( 29 Jun 2020 08:24 )             39.7                         13.7   7.41  )-----------( 182      ( 28 Jun 2020 06:53 )             39.7     30 Jun 2020 07:40    138    |  107    |  13     ----------------------------<  133    3.7     |  26     |  0.85   29 Jun 2020 08:24    139    |  107    |  16     ----------------------------<  69     3.9     |  24     |  0.76   28 Jun 2020 06:53    140    |  107    |  24     ----------------------------<  72     3.2     |  28     |  0.93     Ca    8.3        30 Jun 2020 07:40  Ca    8.2        29 Jun 2020 08:24  Ca    8.1        28 Jun 2020 06:53  Phos  3.2       30 Jun 2020 07:40  Phos  2.3       29 Jun 2020 08:24  Mg     2.1       29 Jun 2020 08:24    TPro  6.4    /  Alb  3.1    /  TBili  1.1    /  DBili  x      /  AST  26     /  ALT  18     /  AlkPhos  70     29 Jun 2020 08:24  TPro  6.4    /  Alb  3.2    /  TBili  1.2    /  DBili  x      /  AST  24     /  ALT  17     /  AlkPhos  66     28 Jun 2020 06:53        12 Lead ECG:   Ventricular Rate 71 BPM  Atrial Rate 71 BPM  P-R Interval 152 ms  QRS Duration 98 ms  Q-T Interval 400 ms  QTC Calculation(Bezet) 434 ms  P Axis 53 degrees  R Axis -46 degrees  T Axis 44 degrees  Diagnosis Line Normal sinus rhythm  Left anterior fascicular block  Abnormal ECG  No previous ECGs available  Confirmed by Eboni Martínez MD ([id number]) on 6/26/2020 [facility] -- Makayla Malik, Tonia, Denise, Tejas Comer Savella  Office # [phone]      Follow Up:    CAD, Cardiac clearance   Subjective/Observations:   No events overnight resting comfortably in bed.  No complaints of chest pain, dyspnea, or palpitations reported. No signs of orthopnea or PND.     REVIEW OF SYSTEMS: All other review of systems is negative unless indicated above    PAST MEDICAL & SURGICAL HISTORY:  Pulmonary hypertension: moderate  COPD (chronic obstructive pulmonary disease): mild (no home oxygen)  Anxiety  CAD (coronary artery disease)  BPH (Benign Prostatic Hyperplasia)  Hyperlipemia  Hypertension  Detached retina, right: with repair  Bilateral cataracts: removed  H/O bilateral hip replacements      MEDICATIONS  (STANDING):  amLODIPine   Tablet 10 milliGRAM(s) Oral daily  atorvastatin 10 milliGRAM(s) Oral at bedtime  cefoTEtan  IVPB 2 Gram(s) IV Intermittent once  famotidine Injectable 20 milliGRAM(s) IV Push every 12 hours  finasteride 5 milliGRAM(s) Oral daily  lactated ringers. 1000 milliLiter(s) (50 mL/Hr) IV Continuous <Continuous>  losartan 100 milliGRAM(s) Oral daily  metoprolol tartrate 25 milliGRAM(s) Oral two times a day  oxybutynin 5 milliGRAM(s) Oral two times a day  tamsulosin 0.4 milliGRAM(s) Oral at bedtime    MEDICATIONS  (PRN):  aluminum hydroxide/magnesium hydroxide/simethicone Suspension 30 milliLiter(s) Oral every 4 hours PRN Dyspepsia  ondansetron Injectable 4 milliGRAM(s) IV Push every 6 hours PRN Nausea      Allergies    penicillin (Hives)    Intolerances        Vital Signs Last 24 Hrs  T(C): 36.9 (01 Jul 2020 04:50), Max: 37.1 (30 Jun 2020 13:47)  T(F): 98.4 (01 Jul 2020 04:50), Max: 98.7 (30 Jun 2020 13:47)  HR: 67 (01 Jul 2020 04:50) (67 - 78)  BP: 134/75 (01 Jul 2020 04:50) (134/75 - 150/78)  BP(mean): --  RR: 20 (01 Jul 2020 04:50) (20 - 20)  SpO2: 93% (01 Jul 2020 04:50) (90% - 93%)    I&O's Summary    29 Jun 2020 07:01  -  30 Jun 2020 07:00  --------------------------------------------------------  IN: 0 mL / OUT: 400 mL / NET: -400 mL    30 Jun 2020 07:01  -  01 Jul 2020 06:22  --------------------------------------------------------  IN: 600 mL / OUT: 0 mL / NET: 600 mL          PHYSICAL EXAM:  TELE: Off tele   Constitutional: NAD, awake and alert, well-developed  HEENT: Moist Mucous Membranes, Anicteric  Pulmonary:  Non-labored, breath sounds bilateral rhonchi throughout , No wheezing, crackles .   Cardiovascular: Regular, S1 and S2 nl, No murmurs, rubs, gallops or clicks  Gastrointestinal: Bowel Sounds present, soft, nontender.   Lymph: No lymphadenopathy. No peripheral edema.  Skin: No visible rashes or ulcers.  Psych:  Mood & affect appropriate    LABS: All Labs Reviewed:                        13.5   10.85 )-----------( 182      ( 30 Jun 2020 07:40 )             38.3                         14.0   7.09  )-----------( 192      ( 29 Jun 2020 08:24 )             39.7                         13.7   7.41  )-----------( 182      ( 28 Jun 2020 06:53 )             39.7     30 Jun 2020 07:40    138    |  107    |  13     ----------------------------<  133    3.7     |  26     |  0.85   29 Jun 2020 08:24    139    |  107    |  16     ----------------------------<  69     3.9     |  24     |  0.76   28 Jun 2020 06:53    140    |  107    |  24     ----------------------------<  72     3.2     |  28     |  0.93     Ca    8.3        30 Jun 2020 07:40  Ca    8.2        29 Jun 2020 08:24  Ca    8.1        28 Jun 2020 06:53  Phos  3.2       30 Jun 2020 07:40  Phos  2.3       29 Jun 2020 08:24  Mg     2.1       29 Jun 2020 08:24    TPro  6.4    /  Alb  3.1    /  TBili  1.1    /  DBili  x      /  AST  26     /  ALT  18     /  AlkPhos  70     29 Jun 2020 08:24  TPro  6.4    /  Alb  3.2    /  TBili  1.2    /  DBili  x      /  AST  24     /  ALT  17     /  AlkPhos  66     28 Jun 2020 06:53        12 Lead ECG:   Ventricular Rate 71 BPM  Atrial Rate 71 BPM  P-R Interval 152 ms  QRS Duration 98 ms  Q-T Interval 400 ms  QTC Calculation(Bezet) 434 ms  P Axis 53 degrees  R Axis -46 degrees  T Axis 44 degrees  Diagnosis Line Normal sinus rhythm  Left anterior fascicular block  Abnormal ECG  No previous ECGs available  Confirmed by Eboni Martínez MD ([id number]) on 6/26/2020 [facility] -- Makayla Malik, Tonia, Denise, Tejas Comer Savella  Office # [phone]      Follow Up:    CAD, Cardiac clearance     Subjective/Observations:   No events overnight resting comfortably in bed.  No complaints of chest pain, dyspnea, or palpitations reported. No signs of orthopnea or PND.     REVIEW OF SYSTEMS: All other review of systems is negative unless indicated above    PAST MEDICAL & SURGICAL HISTORY:  Pulmonary hypertension: moderate  COPD (chronic obstructive pulmonary disease): mild (no home oxygen)  Anxiety  CAD (coronary artery disease)  BPH (Benign Prostatic Hyperplasia)  Hyperlipemia  Hypertension  Detached retina, right: with repair  Bilateral cataracts: removed  H/O bilateral hip replacements      MEDICATIONS  (STANDING):  amLODIPine   Tablet 10 milliGRAM(s) Oral daily  atorvastatin 10 milliGRAM(s) Oral at bedtime  cefoTEtan  IVPB 2 Gram(s) IV Intermittent once  famotidine Injectable 20 milliGRAM(s) IV Push every 12 hours  finasteride 5 milliGRAM(s) Oral daily  lactated ringers. 1000 milliLiter(s) (50 mL/Hr) IV Continuous <Continuous>  losartan 100 milliGRAM(s) Oral daily  metoprolol tartrate 25 milliGRAM(s) Oral two times a day  oxybutynin 5 milliGRAM(s) Oral two times a day  tamsulosin 0.4 milliGRAM(s) Oral at bedtime    MEDICATIONS  (PRN):  aluminum hydroxide/magnesium hydroxide/simethicone Suspension 30 milliLiter(s) Oral every 4 hours PRN Dyspepsia  ondansetron Injectable 4 milliGRAM(s) IV Push every 6 hours PRN Nausea      Allergies    penicillin (Hives)    Intolerances        Vital Signs Last 24 Hrs  T(C): 36.9 (01 Jul 2020 04:50), Max: 37.1 (30 Jun 2020 13:47)  T(F): 98.4 (01 Jul 2020 04:50), Max: 98.7 (30 Jun 2020 13:47)  HR: 67 (01 Jul 2020 04:50) (67 - 78)  BP: 134/75 (01 Jul 2020 04:50) (134/75 - 150/78)  BP(mean): --  RR: 20 (01 Jul 2020 04:50) (20 - 20)  SpO2: 93% (01 Jul 2020 04:50) (90% - 93%)    I&O's Summary    29 Jun 2020 07:01  -  30 Jun 2020 07:00  --------------------------------------------------------  IN: 0 mL / OUT: 400 mL / NET: -400 mL    30 Jun 2020 07:01  -  01 Jul 2020 06:22  --------------------------------------------------------  IN: 600 mL / OUT: 0 mL / NET: 600 mL          PHYSICAL EXAM:  TELE: Off tele   Constitutional: NAD, awake and alert, well-developed  HEENT: Moist Mucous Membranes, Anicteric  Pulmonary:  Non-labored, breath sounds bilateral rhonchi throughout , No wheezing, crackles .   Cardiovascular: Regular, S1 and S2 nl, No murmurs, rubs, gallops or clicks  Gastrointestinal: Bowel Sounds present, soft, nontender.   Lymph: No lymphadenopathy. No peripheral edema.  Skin: No visible rashes or ulcers.  Psych:  Mood & affect appropriate    LABS: All Labs Reviewed:                        13.5   10.85 )-----------( 182      ( 30 Jun 2020 07:40 )             38.3                         14.0   7.09  )-----------( 192      ( 29 Jun 2020 08:24 )             39.7                         13.7   7.41  )-----------( 182      ( 28 Jun 2020 06:53 )             39.7     30 Jun 2020 07:40    138    |  107    |  13     ----------------------------<  133    3.7     |  26     |  0.85   29 Jun 2020 08:24    139    |  107    |  16     ----------------------------<  69     3.9     |  24     |  0.76   28 Jun 2020 06:53    140    |  107    |  24     ----------------------------<  72     3.2     |  28     |  0.93     Ca    8.3        30 Jun 2020 07:40  Ca    8.2        29 Jun 2020 08:24  Ca    8.1        28 Jun 2020 06:53  Phos  3.2       30 Jun 2020 07:40  Phos  2.3       29 Jun 2020 08:24  Mg     2.1       29 Jun 2020 08:24    TPro  6.4    /  Alb  3.1    /  TBili  1.1    /  DBili  x      /  AST  26     /  ALT  18     /  AlkPhos  70     29 Jun 2020 08:24  TPro  6.4    /  Alb  3.2    /  TBili  1.2    /  DBili  x      /  AST  24     /  ALT  17     /  AlkPhos  66     28 Jun 2020 06:53        12 Lead ECG:   Ventricular Rate 71 BPM  Atrial Rate 71 BPM  P-R Interval 152 ms  QRS Duration 98 ms  Q-T Interval 400 ms  QTC Calculation(Bezet) 434 ms  P Axis 53 degrees  R Axis -46 degrees  T Axis 44 degrees  Diagnosis Line Normal sinus rhythm  Left anterior fascicular block  Abnormal ECG  No previous ECGs available  Confirmed by Eboni Martínez MD ([id number]) on 6/26/2020

## 2020-07-01 NOTE — CONSULT NOTE ADULT - PROBLEM SELECTOR RECOMMENDATION 9
continue flomax and finasteride post op when possible, maitain post op arguelles until flomax restarted

## 2020-07-01 NOTE — BRIEF OPERATIVE NOTE - OPERATION/FINDINGS
Ileus.  Small bowel was ran x2.  No distinct transition point- gradual.  No adhesions, bands, hernias noted.

## 2020-07-01 NOTE — DISCHARGE NOTE PROVIDER - NSDCMRMEDTOKEN_GEN_ALL_CORE_FT
amLODIPine 10 mg oral tablet: 1 tab(s) orally once a day  aspirin 325 mg oral tablet:  orally once a day (at bedtime)  finasteride 5 mg oral tablet: 1 tab(s) orally once a day  furosemide 40 mg oral tablet: 1 tab(s) orally once a day  losartan 100 mg oral tablet: 1 tab(s) orally once a day  metoprolol tartrate 25 mg oral tablet: 1 tab(s) orally 2 times a day  Myrbetriq 25 mg oral tablet, extended release: 1 tab(s) orally once a day  potassium chloride 20 mEq oral tablet, extended release: 1 tab(s) orally once a day  pravastatin 40 mg oral tablet: 1 tab(s) orally once a day  raNITIdine 300 mg oral capsule: 1 cap(s) orally once a day (at bedtime)  tamsulosin 0.4 mg oral capsule:  orally once a day (at bedtime) albuterol 90 mcg/inh inhalation aerosol: 2 puff(s) inhaled every 6 hours, As Needed -Shortness of Breath and/or Wheezing   amLODIPine 10 mg oral tablet: 1 tab(s) orally once a day  aspirin 325 mg oral tablet:  orally once a day (at bedtime)  budesonide-formoterol 80 mcg-4.5 mcg/inh inhalation aerosol: 1 dose(s) inhaled 2 times a day   finasteride 5 mg oral tablet: 1 tab(s) orally once a day  furosemide 40 mg oral tablet: 1 tab(s) orally once a day  guaiFENesin 600 mg oral tablet, extended release: 1 tab(s) orally every 12 hours  losartan 100 mg oral tablet: 1 tab(s) orally once a day  metoprolol tartrate 25 mg oral tablet: 1 tab(s) orally 2 times a day  Myrbetriq 25 mg oral tablet, extended release: 1 tab(s) orally once a day  potassium chloride 20 mEq oral tablet, extended release: 1 tab(s) orally once a day  pravastatin 40 mg oral tablet: 1 tab(s) orally once a day  raNITIdine 300 mg oral capsule: 1 cap(s) orally once a day (at bedtime)  tamsulosin 0.4 mg oral capsule:  orally once a day (at bedtime)

## 2020-07-01 NOTE — PROGRESS NOTE ADULT - SUBJECTIVE AND OBJECTIVE BOX
Post Operative Note  Patient: ANIYA GALE 86y (02-Jul-1933) Male   MRN: [id number]  Location: [address] D1  Visit: 06-26-20 Inpatient  Date: 07-01-20 @ 23:58    Procedure: s/p exploratory laparoscopy     Subjective:   87 y/o M seen and examined at bedside. Pt offering no complaints at this time, currently NPO,  admits to flatus however denies bowel movement. Pt denies dizziness, chest pain, sob, nausea, vomiting, abdominal pain, or urinary complaints.    Objective:  Vitals: T(F): 98 (07-01-20 @ 21:08), Max: 99.1 (07-01-20 @ 10:36)  HR: 68 (07-01-20 @ 21:08)  BP: 139/70 (07-01-20 @ 21:08) (119/73 - 170/84)  RR: 17 (07-01-20 @ 21:08)  SpO2: 90% (07-01-20 @ 21:08)      In:   06-30-20 @ 07:01  -  07-01-20 @ 07:00  --------------------------------------------------------  IN: 600 mL    07-01-20 @ 07:01  -  07-01-20 @ 23:58  --------------------------------------------------------  IN: 0 mL      IV Fluids: lactated ringers. 1000 milliLiter(s) (75 mL/Hr) IV Continuous <Continuous>      Out:   06-30-20 @ 07:01  -  07-01-20 @ 07:00  --------------------------------------------------------  OUT: 0 mL    07-01-20 @ 07:01  -  07-01-20 @ 23:58  --------------------------------------------------------  OUT: 650 mL     Voided Urine:   06-30-20 @ 07:01  -  07-01-20 @ 07:00  --------------------------------------------------------  OUT: 0 mL    07-01-20 @ 07:01  -  07-01-20 @ 23:58  --------------------------------------------------------  OUT: 650 mL      PHYSICAL EXAM  GENERAL:  Well-nourished, well-developed Male lying comfortably in bed in NAD  HEAD:  Normocephalic, atraumatic  CARDIO: RRR, no MRG  RESPIRATORY:  CTABL, no ronchi, rales or wheezing  ABDOMEN: soft, protuberant, +bs, nontender to palpation, incisional sites c/d/i  EXTREMITIES: No calf tenderness  NEURO:  A&O x 3      Medications: [Standing]  aluminum hydroxide/magnesium hydroxide/simethicone Suspension 30 milliLiter(s) Oral every 4 hours PRN  amLODIPine   Tablet 10 milliGRAM(s) Oral daily  atorvastatin 10 milliGRAM(s) Oral at bedtime  famotidine Injectable 20 milliGRAM(s) IV Push every 12 hours  finasteride 5 milliGRAM(s) Oral daily  lactated ringers. 1000 milliLiter(s) IV Continuous <Continuous>  losartan 100 milliGRAM(s) Oral daily  metoprolol tartrate 25 milliGRAM(s) Oral two times a day  ondansetron Injectable 4 milliGRAM(s) IV Push every 6 hours PRN  tamsulosin 0.4 milliGRAM(s) Oral at bedtime    Medications: [PRN]  aluminum hydroxide/magnesium hydroxide/simethicone Suspension 30 milliLiter(s) Oral every 4 hours PRN  amLODIPine   Tablet 10 milliGRAM(s) Oral daily  atorvastatin 10 milliGRAM(s) Oral at bedtime  famotidine Injectable 20 milliGRAM(s) IV Push every 12 hours  finasteride 5 milliGRAM(s) Oral daily  lactated ringers. 1000 milliLiter(s) IV Continuous <Continuous>  losartan 100 milliGRAM(s) Oral daily  metoprolol tartrate 25 milliGRAM(s) Oral two times a day  ondansetron Injectable 4 milliGRAM(s) IV Push every 6 hours PRN  tamsulosin 0.4 milliGRAM(s) Oral at bedtime    Labs:                        13.2   8.68  )-----------( 184      ( 01 Jul 2020 07:39 )             37.1     07-01    139  |  106  |  13  ----------------------------<  98  3.3<L>   |  29  |  0.82    Ca    8.0<L>      01 Jul 2020 07:39  Phos  3.2     06-30      Assessment:  87 y/o M s/p exp laparoscopy, currently NPO passing flatus    Plan:  - adv to [facility] for breakfast, f/u diet tolerance  - Pain control, supportive care  - Zofran PRN for nausea  - Incentive spirometry  - OOB, ambulation as tolerated  - SCDs  - Follow up AM labs  - Will continue to monitor

## 2020-07-01 NOTE — PROGRESS NOTE ADULT - ATTENDING COMMENTS
86M, HTN, HL, CAD/stents, COPD/nocturnal BiPAP, BPH; adm n/v/abd pain - CT c/f SBO - failed conservative mgmt w/repeat CTAP showing continued SBO - OR today for exlap  -SBO - postop mgmt per Surgery  -prn analgesia  -prn antiemetics  -dietary advancement per Surgery recomms  -IVFs for volume depletion  -CV- HTN, HL, CAD - continue lipitor, metoprolol, norvasc, losartan; holding asa  -COPD - not decompensated - continue nocturnal BiPAP  -BPH - continue regimen of flomax, finasteride  -dvt prophy 86M, HTN, HL, CAD/stents, COPD/nocturnal BiPAP, BPH; adm n/v/abd pain - CT c/f SBO - failed conservative mgmt w/repeat CTAP showing continued SBO - s/p OR today 7/1 for exlap w/neg findings - c/f ileus  -ileus - less likely SBO - s/p exlap today 7/1 w/neg findings - postop mgmt per Surgery; continued bowel rest/NPO status   -prn analgesia  -prn antiemetics  -dietary advancement per Surgery recomms - NPO for now  -IVFs for volume depletion  -CV- HTN, HL, CAD - continued lipitor, metoprolol, norvasc, losartan; holding asa  -COPD - not decompensated - continue nocturnal BiPAP  -BPH - continued regimen of flomax, finasteride  -dvt prophy

## 2020-07-01 NOTE — DISCHARGE NOTE PROVIDER - NSDCCPCAREPLAN_GEN_ALL_CORE_FT
PRINCIPAL DISCHARGE DIAGNOSIS  Diagnosis: Small bowel obstruction  Assessment and Plan of Treatment: resolved , continue low fiber die for 1 week , f./u with GI Dr Frazier in 2 weeks

## 2020-07-01 NOTE — CONSULT NOTE ADULT - PROBLEM SELECTOR RECOMMENDATION 2
Would NOT give oxybutynin in the noemí op period, as this may cause retention. After d/c, pt can return to preop regimen, and f/u with Dr Peace

## 2020-07-01 NOTE — PROGRESS NOTE ADULT - SUBJECTIVE AND OBJECTIVE BOX
SURGERY PA NOTE ON BEHALF OF DR. SIMPSON / GENERAL SURGERY:    S: Patient seen and examined at bedside.  Reports feeling ok, abdominal pain improved.  Had some wretching/nausea after barium yesterday (for CT enterography performed yesterday - see radiology section below).  +Flatus last night (none yet today), +liquid bm early this morning 0300 (described as "explosive").  Has been ambulating, has been NPO since yesterday.      MEDICATIONS:  aluminum hydroxide/magnesium hydroxide/simethicone Suspension 30 milliLiter(s) Oral every 4 hours PRN  amLODIPine   Tablet 10 milliGRAM(s) Oral daily  atorvastatin 10 milliGRAM(s) Oral at bedtime  cefoTEtan  IVPB 2 Gram(s) IV Intermittent once  famotidine Injectable 20 milliGRAM(s) IV Push every 12 hours  finasteride 5 milliGRAM(s) Oral daily  lactated ringers. 1000 milliLiter(s) IV Continuous <Continuous>  losartan 100 milliGRAM(s) Oral daily  metoprolol tartrate 25 milliGRAM(s) Oral two times a day  ondansetron Injectable 4 milliGRAM(s) IV Push every 6 hours PRN  tamsulosin 0.4 milliGRAM(s) Oral at bedtime      O:  Vital Signs Last 24 Hrs  T(C): 36.9 (01 Jul 2020 04:50), Max: 37.1 (30 Jun 2020 13:47)  T(F): 98.4 (01 Jul 2020 04:50), Max: 98.7 (30 Jun 2020 13:47)  HR: 67 (01 Jul 2020 04:50) (67 - 78)  BP: 134/75 (01 Jul 2020 04:50) (134/75 - 150/78)  BP(mean): --  RR: 20 (01 Jul 2020 04:50) (20 - 20)  SpO2: 93% (01 Jul 2020 04:50) (90% - 93%)    I&O SUMMARY:    06-30-20 @ 07:01  -  07-01-20 @ 07:00  --------------------------------------------------------  IN: 600 mL / OUT: 0 mL / NET: 600 mL        PHYSICAL EXAM:  Lungs: CTA bilat without W/R/R  Card: S1S2  Abd: Still with moderate distenstion, +tympani to percussion throughout.  Non-tender throughout abdomen, no guarding/no rebound.  +BS x 4.  Ext: Calves soft, NT, without edema bilat     LABS:                        13.2   8.68  )-----------( 184      ( 01 Jul 2020 07:39 )             37.1     07-01    139  |  106  |  13  ----------------------------<  98  3.3<L>   |  29  |  0.82    Ca    8.0<L>      01 Jul 2020 07:39  Phos  3.2     06-30      RADIOLOGY:  EXAM:  CT ENTEROGRAPHY OC IC                        PROCEDURE DATE:  06/30/2020    INTERPRETATION:   CT ABDOMEN AND PELVIS  CLINICAL INFORMATION:  Abdominal distention, follow-up small bowel obstruction.  PROCEDURE: CT enterography isperformed following the oral ministration of negative contrast and arterial and portal venous phase imaging.  Axial images are obtained at 3.75 mm intervals with coronal sagittal reformatted images.  COMPARISON: CT scans abdomen pelvis 6/29/2020 6/28/2020.  FINDINGS:  The stomach is moderately distended.  There is a small hiatal hernia.  There are persistent fluid-filled dilated small bowel loops. There is a transition in the distal small bowel anteriorly right lower quadrant, as seen on series 2, images 80 through 83 and series 602, images 33-35.  There is an abrupt angulation of the small bowel anteriorly at this level.  No intraluminal mass or focal bowel wall thickening is noted.  Previously administered enteric contrast has transited to the colon, including rectosigmoid colon.  There is mild mesenteric edema/fluid.  Colonic diverticulosis is noted without evidence of diverticulitis.  There is a normal-appearing appendix.  Other findings are stable from prior exam.  Impression:  CT findings compatible with partial distal small bowel obstruction with change in caliber anteriorly within the right lower quadrant.  AMARI CABALLERO M.D., ATTENDING RADIOLOGIST  This document has been electronically signed. Jun 30 2020  1:40PM      A: 87 yo M with PMHx of CAD s/p MI and stents to RCA and LAD, COPD (on bipap at night), HTN, HLD, BPH; admitted with N/V and partial SBO          P: Discussed with Dr. Simpson      Patient with persistent distention and little change in clinical appearance; although passing flatus - CT enterography yesterday shows little appreciable change in SBO from admission       radiographic findings       Scheduled for ex-lap in OR today, continue with planned surgical intervention      Maintain NPO status, IVF      Will continue to follow post-operatively

## 2020-07-02 LAB
ANION GAP SERPL CALC-SCNC: 5 MMOL/L — SIGNIFICANT CHANGE UP (ref 5–17)
BUN SERPL-MCNC: 13 MG/DL — SIGNIFICANT CHANGE UP (ref 7–23)
CALCIUM SERPL-MCNC: 8.4 MG/DL — LOW (ref 8.5–10.1)
CHLORIDE SERPL-SCNC: 107 MMOL/L — SIGNIFICANT CHANGE UP (ref 96–108)
CO2 SERPL-SCNC: 28 MMOL/L — SIGNIFICANT CHANGE UP (ref 22–31)
CREAT SERPL-MCNC: 0.89 MG/DL — SIGNIFICANT CHANGE UP (ref 0.5–1.3)
GLUCOSE SERPL-MCNC: 120 MG/DL — HIGH (ref 70–99)
HCT VFR BLD CALC: 39.1 % — SIGNIFICANT CHANGE UP (ref 39–50)
HGB BLD-MCNC: 13.7 G/DL — SIGNIFICANT CHANGE UP (ref 13–17)
MAGNESIUM SERPL-MCNC: 2.2 MG/DL — SIGNIFICANT CHANGE UP (ref 1.6–2.6)
MCHC RBC-ENTMCNC: 30 PG — SIGNIFICANT CHANGE UP (ref 27–34)
MCHC RBC-ENTMCNC: 35 GM/DL — SIGNIFICANT CHANGE UP (ref 32–36)
MCV RBC AUTO: 85.6 FL — SIGNIFICANT CHANGE UP (ref 80–100)
NRBC # BLD: 0 /100 WBCS — SIGNIFICANT CHANGE UP (ref 0–0)
PHOSPHATE SERPL-MCNC: 3 MG/DL — SIGNIFICANT CHANGE UP (ref 2.5–4.5)
PLATELET # BLD AUTO: 219 K/UL — SIGNIFICANT CHANGE UP (ref 150–400)
POTASSIUM SERPL-MCNC: 3.9 MMOL/L — SIGNIFICANT CHANGE UP (ref 3.5–5.3)
POTASSIUM SERPL-SCNC: 3.9 MMOL/L — SIGNIFICANT CHANGE UP (ref 3.5–5.3)
RBC # BLD: 4.57 M/UL — SIGNIFICANT CHANGE UP (ref 4.2–5.8)
RBC # FLD: 12.8 % — SIGNIFICANT CHANGE UP (ref 10.3–14.5)
SODIUM SERPL-SCNC: 140 MMOL/L — SIGNIFICANT CHANGE UP (ref 135–145)
WBC # BLD: 8.32 K/UL — SIGNIFICANT CHANGE UP (ref 3.8–10.5)
WBC # FLD AUTO: 8.32 K/UL — SIGNIFICANT CHANGE UP (ref 3.8–10.5)

## 2020-07-02 PROCEDURE — 99232 SBSQ HOSP IP/OBS MODERATE 35: CPT

## 2020-07-02 PROCEDURE — 99233 SBSQ HOSP IP/OBS HIGH 50: CPT

## 2020-07-02 RX ADMIN — SODIUM CHLORIDE 75 MILLILITER(S): 9 INJECTION, SOLUTION INTRAVENOUS at 17:50

## 2020-07-02 RX ADMIN — Medication 25 MILLIGRAM(S): at 05:04

## 2020-07-02 RX ADMIN — FINASTERIDE 5 MILLIGRAM(S): 5 TABLET, FILM COATED ORAL at 11:24

## 2020-07-02 RX ADMIN — Medication 25 MILLIGRAM(S): at 17:48

## 2020-07-02 RX ADMIN — TAMSULOSIN HYDROCHLORIDE 0.4 MILLIGRAM(S): 0.4 CAPSULE ORAL at 20:57

## 2020-07-02 RX ADMIN — ATORVASTATIN CALCIUM 10 MILLIGRAM(S): 80 TABLET, FILM COATED ORAL at 20:57

## 2020-07-02 RX ADMIN — FAMOTIDINE 20 MILLIGRAM(S): 10 INJECTION INTRAVENOUS at 05:03

## 2020-07-02 RX ADMIN — AMLODIPINE BESYLATE 10 MILLIGRAM(S): 2.5 TABLET ORAL at 05:03

## 2020-07-02 RX ADMIN — FAMOTIDINE 20 MILLIGRAM(S): 10 INJECTION INTRAVENOUS at 17:48

## 2020-07-02 RX ADMIN — LOSARTAN POTASSIUM 100 MILLIGRAM(S): 100 TABLET, FILM COATED ORAL at 05:03

## 2020-07-02 NOTE — PROGRESS NOTE ADULT - ATTENDING COMMENTS
Pt. seen and examined. He has no complaints. States he is passing gas and liquid stool.  Abdomen is still protuberant., but very soft.

## 2020-07-02 NOTE — PROGRESS NOTE ADULT - ASSESSMENT
87 yo M PMHx of CAD s/p MI and stents to RCA and LAD 1997, COPD (on bipap at night), HTN, HLD, BPH presents to the ED with 2 days of abdominal pain and N/V. Found to have SBO    CAD with normal LV function  - Had a nuclear stress on 6/24  which showed small, mod fixed defect apex EF 68% which was unchanged from his previous test 06/2019.   - TTE 01/2020 EF 72%, mild LVH, mild AI/PI/TR, sev LAE, PASP 38-43mmHg  - No s/s acute ischemia or HF at this time  - Continue ASA  - Continue statin  - Continue metoprolol, losartan, Norvasc  -On tele w/o arrythmia overnight will D/C tele    HTN,   - mild LVH on TTE  - well controlled   - Continue BB, ARB, CCB    Hyperlipidemia  - Continue statin     SBO  - S/P LAPAROSCOPY w/o cardiac complications  - BP well controlled, monitor routine hemodynamic   - per primary /surgical team    - Monitor and replete lytes, keep K>4, Mg>2.  - All other medical needs as per primary team.  - Other cardiovascular workup will depend on clinical course.  - Will continue to follow.    Main Jordan DNP, ANP-c  Cardiology   Spectra #[phone]

## 2020-07-02 NOTE — PROGRESS NOTE ADULT - SUBJECTIVE AND OBJECTIVE BOX
INTERVAL HPI/OVERNIGHT EVENTS:  pt seen and examined  sp OR yesterday  denies n/v/abd pain  passing gas  wants reg diet    MEDICATIONS  (STANDING):  amLODIPine   Tablet 10 milliGRAM(s) Oral daily  atorvastatin 10 milliGRAM(s) Oral at bedtime  famotidine Injectable 20 milliGRAM(s) IV Push every 12 hours  finasteride 5 milliGRAM(s) Oral daily  lactated ringers. 1000 milliLiter(s) (75 mL/Hr) IV Continuous <Continuous>  losartan 100 milliGRAM(s) Oral daily  metoprolol tartrate 25 milliGRAM(s) Oral two times a day  tamsulosin 0.4 milliGRAM(s) Oral at bedtime    MEDICATIONS  (PRN):  aluminum hydroxide/magnesium hydroxide/simethicone Suspension 30 milliLiter(s) Oral every 4 hours PRN Dyspepsia  ondansetron Injectable 4 milliGRAM(s) IV Push every 6 hours PRN Nausea      Allergies    penicillin (Hives)    Intolerances        Review of Systems:    General:  No wt loss, fevers, chills, night sweats, fatigue   Eyes:  Good vision, no reported pain  ENT:  No sore throat, pain, runny nose, dysphagia  CV:  No pain, palpitations, hypo/hypertension  Resp:  No dyspnea, cough, tachypnea, wheezing  GI:  No pain, No nausea, No vomiting, No diarrhea, No constipation, No weight loss, No fever, No pruritis, No rectal bleeding, No melena, No dysphagia  :  No pain, bleeding, incontinence, nocturia  Muscle:  No pain, weakness  Neuro:  No weakness, tingling, memory problems  Psych:  No fatigue, insomnia, mood problems, depression  Endocrine:  No polyuria, polydypsia, cold/heat intolerance  Heme:  No petechiae, ecchymosis, easy bruisability  Skin:  No rash, tattoos, scars, edema      Vital Signs Last 24 Hrs  T(C): 36.9 (02 Jul 2020 04:30), Max: 37.3 (01 Jul 2020 10:36)  T(F): 98.4 (02 Jul 2020 04:30), Max: 99.1 (01 Jul 2020 10:36)  HR: 72 (02 Jul 2020 04:30) (58 - 85)  BP: 156/78 (02 Jul 2020 04:30) (119/73 - 170/84)  BP(mean): --  RR: 17 (02 Jul 2020 04:30) (11 - 19)  SpO2: 93% (02 Jul 2020 04:30) (90% - 100%)    PHYSICAL EXAM:    Constitutional: NAD  HEENT: ncat  Gastrointestinal: soft nt +dt  Extremities: No peripheral edema  Vascular: + peripheral pulses  Neurological: A/O x 3    LABS:                        13.7   8.32  )-----------( 219      ( 02 Jul 2020 06:56 )             39.1     07-02    140  |  107  |  13  ----------------------------<  120<H>  3.9   |  28  |  0.89    Ca    8.4<L>      02 Jul 2020 06:56  Phos  3.0     07-02  Mg     2.2     07-02            RADIOLOGY & ADDITIONAL TESTS:

## 2020-07-02 NOTE — PROGRESS NOTE ADULT - SUBJECTIVE AND OBJECTIVE BOX
[facility] -- Makayla Malik, Tonia, Denise, Tejas Comer Savella  Office # [phone]      Follow Up:    CAD  Subjective/Observations:   No events overnight resting comfortably in bed.  No complaints of chest pain, dyspnea, or palpitations reported. No signs of orthopnea or PND.     REVIEW OF SYSTEMS: All other review of systems is negative unless indicated above    PAST MEDICAL & SURGICAL HISTORY:  Pulmonary hypertension: moderate  COPD (chronic obstructive pulmonary disease): mild (no home oxygen)  Anxiety  CAD (coronary artery disease)  BPH (Benign Prostatic Hyperplasia)  Hyperlipemia  Hypertension  Detached retina, right: with repair  Bilateral cataracts: removed  H/O bilateral hip replacements      MEDICATIONS  (STANDING):  amLODIPine   Tablet 10 milliGRAM(s) Oral daily  atorvastatin 10 milliGRAM(s) Oral at bedtime  famotidine Injectable 20 milliGRAM(s) IV Push every 12 hours  finasteride 5 milliGRAM(s) Oral daily  lactated ringers. 1000 milliLiter(s) (75 mL/Hr) IV Continuous <Continuous>  losartan 100 milliGRAM(s) Oral daily  metoprolol tartrate 25 milliGRAM(s) Oral two times a day  tamsulosin 0.4 milliGRAM(s) Oral at bedtime    MEDICATIONS  (PRN):  aluminum hydroxide/magnesium hydroxide/simethicone Suspension 30 milliLiter(s) Oral every 4 hours PRN Dyspepsia  ondansetron Injectable 4 milliGRAM(s) IV Push every 6 hours PRN Nausea      Allergies    penicillin (Hives)    Intolerances        Vital Signs Last 24 Hrs  T(C): 36.9 (02 Jul 2020 04:30), Max: 37.3 (01 Jul 2020 10:36)  T(F): 98.4 (02 Jul 2020 04:30), Max: 99.1 (01 Jul 2020 10:36)  HR: 72 (02 Jul 2020 04:30) (58 - 85)  BP: 156/78 (02 Jul 2020 04:30) (119/73 - 170/84)  BP(mean): --  RR: 17 (02 Jul 2020 04:30) (11 - 19)  SpO2: 93% (02 Jul 2020 04:30) (90% - 100%)    I&O's Summary    01 Jul 2020 07:01  -  02 Jul 2020 07:00  --------------------------------------------------------  IN: 900 mL / OUT: 650 mL / NET: 250 mL          PHYSICAL EXAM:  TELE: SR  Constitutional: NAD, awake and alert, well-developed  HEENT: Moist Mucous Membranes, Anicteric  Pulmonary: Non-labored, breath sounds are clear bilaterally, No wheezing, crackles or rhonchi  Cardiovascular: Regular, S1 and S2 nl, No murmurs, rubs, gallops or clicks  Gastrointestinal: Bowel Sounds present, soft, nontender.   Lymph: No lymphadenopathy. No peripheral edema.  Skin: No visible rashes or ulcers.  Psych:  Mood & affect appropriate    LABS: All Labs Reviewed:                        13.7   8.32  )-----------( 219      ( 02 Jul 2020 06:56 )             39.1                         13.2   8.68  )-----------( 184      ( 01 Jul 2020 07:39 )             37.1                         13.5   10.85 )-----------( 182      ( 30 Jun 2020 07:40 )             38.3     02 Jul 2020 06:56    140    |  107    |  13     ----------------------------<  120    3.9     |  28     |  0.89   01 Jul 2020 07:39    139    |  106    |  13     ----------------------------<  98     3.3     |  29     |  0.82   30 Jun 2020 07:40    138    |  107    |  13     ----------------------------<  133    3.7     |  26     |  0.85     Ca    8.4        02 Jul 2020 06:56  Ca    8.0        01 Jul 2020 07:39  Ca    8.3        30 Jun 2020 07:40  Phos  3.0       02 Jul 2020 06:56  Phos  3.2       30 Jun 2020 07:40  Mg     2.2       02 Jul 2020 06:56               ECG:  < from: 12 Lead ECG (06.26.20 @ 14:12) >  Ventricular Rate 71 BPM    Atrial Rate 71 BPM    P-R Interval 152 ms    QRS Duration 98 ms    Q-T Interval 400 ms    QTC Calculation(Bezet) 434 ms    P Axis 53 degrees    R Axis -46 degrees    T Axis 44 degrees    Diagnosis Line Normal sinus rhythm  Left anterior fascicular block  Abnormal ECG  No previous ECGs available  Confirmed by Eboni Martínez MD ([id number]) on 6/26/2020 4:41:12 PM    < end of copied text >

## 2020-07-02 NOTE — PROGRESS NOTE ADULT - SUBJECTIVE AND OBJECTIVE BOX
STATUS POST:  diagnostic lap    POST OPERATIVE DAY #: 1    SUBJECTIVE:  Patient seen and examined at bedside.  No overnight events.  Patient with no new complaints at this time, was NPO in AM, admits to flatus and denies bowel movement.  Patient denies any fevers, chills, chest pain, shortness of breath, abdominal pain, nausea, vomiting or diarrhea.    Vital Signs Last 24 Hrs  T(C): 36.9 (02 Jul 2020 04:30), Max: 37 (01 Jul 2020 14:01)  T(F): 98.4 (02 Jul 2020 04:30), Max: 98.6 (01 Jul 2020 14:01)  HR: 72 (02 Jul 2020 04:30) (61 - 85)  BP: 156/78 (02 Jul 2020 04:30) (129/99 - 170/84)  BP(mean): --  RR: 17 (02 Jul 2020 04:30) (11 - 19)  SpO2: 93% (02 Jul 2020 04:30) (90% - 100%)    PHYSICAL EXAM:  GENERAL: No acute distress, well-developed  HEAD:  Atraumatic, Normocephalic  ABDOMEN: Soft, non-tender, distended; high pitched bowel sounds+  NEUROLOGY: A&O x 3, no focal deficits    I&O's Summary    01 Jul 2020 07:01  -  02 Jul 2020 07:00  --------------------------------------------------------  IN: 900 mL / OUT: 650 mL / NET: 250 mL      I&O's Detail    01 Jul 2020 07:01  -  02 Jul 2020 07:00  --------------------------------------------------------  IN:    lactated ringers.: 900 mL  Total IN: 900 mL    OUT:    Voided: 650 mL  Total OUT: 650 mL    Total NET: 250 mL    MEDICATIONS  (STANDING):  amLODIPine   Tablet 10 milliGRAM(s) Oral daily  atorvastatin 10 milliGRAM(s) Oral at bedtime  famotidine Injectable 20 milliGRAM(s) IV Push every 12 hours  finasteride 5 milliGRAM(s) Oral daily  lactated ringers. 1000 milliLiter(s) (75 mL/Hr) IV Continuous <Continuous>  losartan 100 milliGRAM(s) Oral daily  metoprolol tartrate 25 milliGRAM(s) Oral two times a day  tamsulosin 0.4 milliGRAM(s) Oral at bedtime    MEDICATIONS  (PRN):  aluminum hydroxide/magnesium hydroxide/simethicone Suspension 30 milliLiter(s) Oral every 4 hours PRN Dyspepsia  ondansetron Injectable 4 milliGRAM(s) IV Push every 6 hours PRN Nausea    LABS:                        13.7   8.32  )-----------( 219      ( 02 Jul 2020 06:56 )             39.1     07-02    140  |  107  |  13  ----------------------------<  120<H>  3.9   |  28  |  0.89    Ca    8.4<L>      02 Jul 2020 06:56  Phos  3.0     07-02  Mg     2.2     07-02          RADIOLOGY & ADDITIONAL STUDIES:    ASSESSMENT    87y Male POD 1 s/p diagnostic lap  revealing ileus currently still distended    PLAN  - Discussed with Dr. Simpson  - adv to [facility]  - f/u suyapa  - incentive spirometer  - pain control  - serial abdominal exams  - labs in am    Surgical Team Contact Information  Spectralink: Ext: [phone] or [phone]  Pager: [phone]

## 2020-07-02 NOTE — PHYSICAL THERAPY INITIAL EVALUATION ADULT - ADDITIONAL COMMENTS
Patient lives in private home [address] with railing, stairs to basement with chair lift.  Patient was independent in all ADLs and ambulated without device.  Patient has access to SAC and RW as needed

## 2020-07-02 NOTE — PROGRESS NOTE ADULT - ATTENDING COMMENTS
86M, HTN, HL, CAD/stents, COPD/nocturnal BiPAP, BPH; adm n/v/abd pain - CT c/f SBO - failed conservative mgmt w/repeat CTAP showing continued SBO - s/p OR today 7/1 for exlap w/neg findings - c/f ileus  -ileus - less likely SBO - s/p exlap today 7/1 w/neg findings - postop mgmt per Surgery; continued bowel rest/NPO status   -prn analgesia  -prn antiemetics  -dietary advancement per Surgery recomms - NPO for now  -IVFs for volume depletion  -CV- HTN, HL, CAD - continued lipitor, metoprolol, norvasc, losartan; holding asa  -COPD - not decompensated - continue nocturnal BiPAP  -BPH - continued regimen of flomax, finasteride  -dvt prophy 86M, HTN, HL, CAD/stents, COPD/nocturnal BiPAP, BPH; adm n/v/abd pain - CT c/f SBO - failed conservative mgmt w/repeat CTAP showing continued SBO - s/p OR 7/1 for exlap w/neg findings - c/f ileus    Ileus - no e/o colonic obstructive process  -s/p exlap 7/1 w/neg findings for SBO - postop mgmt per Surgery - started passing flatus, diet advanced - currently on clear liquids  -prn analgesia  -prn antiemetics    volume depletion - gentle IVFs    HTN, HL, CAD - continued lipitor, metoprolol, norvasc, losartan; holding asa perioperatively    COPD - not decompensated - continue nocturnal BiPAP    BPH - continued regimen of flomax, finasteride    dvt prophy

## 2020-07-02 NOTE — PHYSICAL THERAPY INITIAL EVALUATION ADULT - PERTINENT HX OF CURRENT PROBLEM, REHAB EVAL
85 yo M PMHx of CAD s/p MI and stents to RCA and LAD, COPD (on bipap at night), HTN, HLD, BPH presents to the ED with 2 days of abdominal pain and N/V. Patient states that for the past 2 days he has been having epigastric burning with some lower abdominal pain.

## 2020-07-02 NOTE — CHART NOTE - NSCHARTNOTEFT_GEN_A_CORE
Assessment: 88 y/o male (birthday is today) adm with SBO. PMH CAD s/p MI and stents, COPD (on BIPAP at night), HTN, HLD, BPH. S/p OR 7/1 exp lap (ileus). Pt states that he is tolerating clear liquids well along with Ensure clear. Looking forward to diet advancement. Last BM 7/1.    Factors impacting intake: [ ] none [ ] nausea  [ ] vomiting [ ] diarrhea [ ] constipation  [ ]chewing problems [ ] swallowing issues  [ x] other:     Diet Presciption: Diet, Clear Liquid (07-02-20 @ 00:02)    Intake: % clear liquids     Current Weight: Weight (kg): 95.3 (06-26 @ 18:35), 93.9 (06-26 @ 13:34)  % Weight Change    Pertinent Medications: MEDICATIONS  (STANDING):  amLODIPine   Tablet 10 milliGRAM(s) Oral daily  atorvastatin 10 milliGRAM(s) Oral at bedtime  famotidine Injectable 20 milliGRAM(s) IV Push every 12 hours  finasteride 5 milliGRAM(s) Oral daily  lactated ringers. 1000 milliLiter(s) (75 mL/Hr) IV Continuous <Continuous>  losartan 100 milliGRAM(s) Oral daily  metoprolol tartrate 25 milliGRAM(s) Oral two times a day  tamsulosin 0.4 milliGRAM(s) Oral at bedtime    MEDICATIONS  (PRN):  aluminum hydroxide/magnesium hydroxide/simethicone Suspension 30 milliLiter(s) Oral every 4 hours PRN Dyspepsia  ondansetron Injectable 4 milliGRAM(s) IV Push every 6 hours PRN Nausea    Pertinent Labs: 07-02 Na140 mmol/L Glu 120 mg/dL<H> K+ 3.9 mmol/L Cr  0.89 mg/dL BUN 13 mg/dL 07-02 Phos 3.0 mg/dL 06-29 Alb 3.1 g/dL<L>     CAPILLARY BLOOD GLUCOSE        Skin: no pressure ulcers noted.     Estimated Needs:   [x ] no change since previous assessment  [ ] recalculated:     Previous Nutrition Diagnosis:   [ ] Inadequate Energy Intake [ ]Inadequate Oral Intake [ ] Excessive Energy Intake   [ ] Underweight [ ] Increased Nutrient Needs [ ] Overweight/Obesity   [x] Altered GI Function [ ] Unintended Weight Loss [ ] Food & Nutrition Related Knowledge Deficit [ ] Malnutrition     Nutrition Diagnosis is [ x] ongoing  [ ] resolved [ ] not applicable     New Nutrition Diagnosis: [x ] not applicable       Interventions:   Recommend  [x ] Change Diet To: when medically feasible, consider adv diet as tolerated to low fiber, soft, DASH/TLC   [ ] Nutrition Supplement  [ ] Nutrition Support  [ ] Other:     Monitoring and Evaluation:   [x ] PO intake [ x ] Tolerance to diet prescription [ x ] weights [ x ] labs[ x ] follow up per protocol  [ ] other: Assessment: 88 y/o male (birthday is today) adm with SBO. PMH CAD s/p MI and stents, COPD (on BIPAP at night), HTN, HLD, BPH. S/p OR 7/1 exp lap (ileus). Pt states that he is tolerating clear liquids well along with Ensure clear. Looking forward to diet advancement. Last BM 7/1.    Factors impacting intake: [ ] none [ ] nausea  [ ] vomiting [ ] diarrhea [ ] constipation  [ ]chewing problems [ ] swallowing issues  [ x] other:     Diet Presciption: Diet, Clear Liquid (07-02-20 @ 00:02)    Intake: % clear liquids     Current Weight: Weight (kg): 95.3 (06-26 @ 18:35), 93.9 (06-26 @ 13:34)  % Weight Change    Pertinent Medications: MEDICATIONS  (STANDING):  amLODIPine   Tablet 10 milliGRAM(s) Oral daily  atorvastatin 10 milliGRAM(s) Oral at bedtime  famotidine Injectable 20 milliGRAM(s) IV Push every 12 hours  finasteride 5 milliGRAM(s) Oral daily  lactated ringers. 1000 milliLiter(s) (75 mL/Hr) IV Continuous <Continuous>  losartan 100 milliGRAM(s) Oral daily  metoprolol tartrate 25 milliGRAM(s) Oral two times a day  tamsulosin 0.4 milliGRAM(s) Oral at bedtime    MEDICATIONS  (PRN):  aluminum hydroxide/magnesium hydroxide/simethicone Suspension 30 milliLiter(s) Oral every 4 hours PRN Dyspepsia  ondansetron Injectable 4 milliGRAM(s) IV Push every 6 hours PRN Nausea    Pertinent Labs: 07-02 Na140 mmol/L Glu 120 mg/dL<H> K+ 3.9 mmol/L Cr  0.89 mg/dL BUN 13 mg/dL 07-02 Phos 3.0 mg/dL 06-29 Alb 3.1 g/dL<L>     CAPILLARY BLOOD GLUCOSE        Skin: no pressure ulcers noted.     Estimated Needs:   [x ] no change since previous assessment  [ ] recalculated:     Previous Nutrition Diagnosis:   [ ] Inadequate Energy Intake [ ]Inadequate Oral Intake [ ] Excessive Energy Intake   [ ] Underweight [ ] Increased Nutrient Needs [ ] Overweight/Obesity   [x] Altered GI Function [ ] Unintended Weight Loss [ ] Food & Nutrition Related Knowledge Deficit [ ] Malnutrition     Nutrition Diagnosis is [ x] ongoing  [ ] resolved [ ] not applicable     New Nutrition Diagnosis: [ ] not applicable   [x] malnutrition (acute/moderate) related to decreased energy intake as evidenced by pt consuming <75% est energy needs along with 3% wt loss 1 1/2 weeks     Interventions:   Recommend  [x ] Change Diet To: when medically feasible, consider adv diet as tolerated to low fiber, soft, DASH/TLC   [ ] Nutrition Supplement  [ ] Nutrition Support  [ ] Other:     Monitoring and Evaluation:   [x ] PO intake [ x ] Tolerance to diet prescription [ x ] weights [ x ] labs[ x ] follow up per protocol  [ ] other:

## 2020-07-02 NOTE — PHYSICAL THERAPY INITIAL EVALUATION ADULT - GAIT TRAINING, PT EVAL
Patient will ambulate x 150 feet without device independently in 1 week in order to increase safety at home

## 2020-07-02 NOTE — CHART NOTE - NSCHARTNOTEFT_GEN_A_CORE
Upon Nutritional Assessment by the Registered Dietitian your patient was determined to meet criteria / has evidence of the following diagnosis/diagnoses:          [ ]  Mild Protein Calorie Malnutrition        [ x]  Moderate Protein Calorie Malnutrition        [ ] Severe Protein Calorie Malnutrition        [ ] Unspecified Protein Calorie Malnutrition        [ ] Underweight / BMI <19        [ ] Morbid Obesity / BMI > 40      Findings as based on:  •  Comprehensive nutrition assessment and consultation  •  Calorie counts (nutrient intake analysis)  •  Food acceptance and intake status from observations by staff  •  Follow up  •  Patient education  •  Intervention secondary to interdisciplinary rounds  •   concerns  * <75% est needs met along with 3% wt loss over past 1 1/2 weeks     Treatment:    The following diet has been recommended:   continue to provide pt with Ensure clear BID  when medically feasible; consider adv diet as tolerated to soft, low fiber, DASH/TLC       PROVIDER Section:     By signing this assessment you are acknowledging and agree with the diagnosis/diagnoses assigned by the Registered Dietitian    Comments:

## 2020-07-02 NOTE — PROGRESS NOTE ADULT - SUBJECTIVE AND OBJECTIVE BOX
The patient was interviewed and evaluated  87y Male s/p exploratory lap with GA    T(C): 36.9 (07-02-20 @ 04:30), Max: 37 (07-01-20 @ 14:01)  HR: 72 (07-02-20 @ 04:30) (61 - 85)  BP: 156/78 (07-02-20 @ 04:30) (129/99 - 170/84)  RR: 17 (07-02-20 @ 04:30) (11 - 19)  SpO2: 93% (07-02-20 @ 04:30) (90% - 100%)  Wt(kg): --    Pt seen, doing well, no anesthesia complications or complaints noted or reported.   No Nausea    No additional recommendations.     Pain well controlled

## 2020-07-02 NOTE — PROGRESS NOTE ADULT - SUBJECTIVE AND OBJECTIVE BOX
CC/F/U for:     HPI:  87 yo M PMHx of CAD s/p MI and stents to RCA and LAD, COPD (on bipap at night), HTN, HLD, BPH presents to the ED with 2 days of abdominal pain and N/V. Patient states that for the past 2 days he has been having epigastric burning with some lower abdominal pain. Has not had anything PO in the past two days due to nausea and vomiting. States that had 6-7 episodes of emesis today. States that it is brown in color. Also admits to some loose stools since yesterday. States that his symptoms have improved following the pepcid and zofran. Admits to some chest discomfort and pain in his shoulders b/l. Admits to some mild shortness of breath, which is chronic. Had a nuclear stress on 6/24 which was unchanged from his previous test. Denies fever, chills, headache, dizziness, cough.    IN THE ED:  Temp 98.7  F , HR 70 , BP  153/80  ,RR 18 , SpO2 98% on RA  S/P pepcid and zofran, 1L IVF  EKG: NSR, LAFB  Labs significant for WBC 13.81, BUN 29, Glucose 145, bilirubin 1.4, toponin .000  CT A/P: IMPRESSION: Distal small bowel obstruction. Mild interloop edema/fluid.    Surgical team saw pt in the ED, did not recommend NG tube or any other intervention for now. (26 Jun 2020 21:48)        INTERVAL HPI/OVERNIGHT EVENTS:  Pt seen and examined at bedside.     Allergies/Intolerance: penicillin (Hives)      MEDICATIONS  (STANDING):  amLODIPine   Tablet 10 milliGRAM(s) Oral daily  atorvastatin 10 milliGRAM(s) Oral at bedtime  famotidine Injectable 20 milliGRAM(s) IV Push every 12 hours  finasteride 5 milliGRAM(s) Oral daily  lactated ringers. 1000 milliLiter(s) (75 mL/Hr) IV Continuous <Continuous>  losartan 100 milliGRAM(s) Oral daily  metoprolol tartrate 25 milliGRAM(s) Oral two times a day  tamsulosin 0.4 milliGRAM(s) Oral at bedtime    MEDICATIONS  (PRN):  aluminum hydroxide/magnesium hydroxide/simethicone Suspension 30 milliLiter(s) Oral every 4 hours PRN Dyspepsia  ondansetron Injectable 4 milliGRAM(s) IV Push every 6 hours PRN Nausea        ROS: as above; all other systems reviewed and wnl      PHYSICAL EXAMINATION:  Vital Signs Last 24 Hrs  T(C): 36.9 (02 Jul 2020 04:30), Max: 37 (01 Jul 2020 14:01)  T(F): 98.4 (02 Jul 2020 04:30), Max: 98.6 (01 Jul 2020 14:01)  HR: 72 (02 Jul 2020 04:30) (61 - 85)  BP: 156/78 (02 Jul 2020 04:30) (135/76 - 170/84)  BP(mean): --  RR: 17 (02 Jul 2020 04:30) (11 - 19)  SpO2: 93% (02 Jul 2020 04:30) (90% - 100%)  CAPILLARY BLOOD GLUCOSE          GENERAL: NAD  HEENT: mucous membranes dry  CHEST: respirations unlabored  HEART: HR s  ABDOMEN: soft, ND, NT   EXTREMITIES:  no edema b/l LEs, no calf tenderness  NEURO: awake, alert, interactive; moves all extremities      LABS:                        13.7   8.32  )-----------( 219      ( 02 Jul 2020 06:56 )             39.1     07-02    140  |  107  |  13  ----------------------------<  120<H>  3.9   |  28  |  0.89    Ca    8.4<L>      02 Jul 2020 06:56  Phos  3.0     07-02  Mg     2.2     07-02              RADIOLOGY & ADDITIONAL TESTS:      ASSESSMENT AND PLAN:  87y Male CC/F/U for: ileus    HPI:  85 yo M PMHx of CAD s/p MI and stents to RCA and LAD, COPD (on bipap at night), HTN, HLD, BPH presents to the ED with 2 days of abdominal pain and N/V. Patient states that for the past 2 days he has been having epigastric burning with some lower abdominal pain. Has not had anything PO in the past two days due to nausea and vomiting. States that had 6-7 episodes of emesis today. States that it is brown in color. Also admits to some loose stools since yesterday. States that his symptoms have improved following the pepcid and zofran. Admits to some chest discomfort and pain in his shoulders b/l. Admits to some mild shortness of breath, which is chronic. Had a nuclear stress on 6/24 which was unchanged from his previous test. Denies fever, chills, headache, dizziness, cough.    IN THE ED:  Temp 98.7  F , HR 70 , BP  153/80  ,RR 18 , SpO2 98% on RA  S/P pepcid and zofran, 1L IVF  EKG: NSR, LAFB  Labs significant for WBC 13.81, BUN 29, Glucose 145, bilirubin 1.4, toponin .000  CT A/P: IMPRESSION: Distal small bowel obstruction. Mild interloop edema/fluid.    Surgical team saw pt in the ED, did not recommend NG tube or any other intervention for now. (26 Jun 2020 21:48)      INTERVAL HPI/OVERNIGHT EVENTS:  Pt seen and examined at bedside - s/p OR yesterday w/neg findings - c/f ileus; started passing flatus; advanced npo-> clears.     Allergies/Intolerance: penicillin (Hives)      MEDICATIONS  (STANDING):  amLODIPine   Tablet 10 milliGRAM(s) Oral daily  atorvastatin 10 milliGRAM(s) Oral at bedtime  famotidine Injectable 20 milliGRAM(s) IV Push every 12 hours  finasteride 5 milliGRAM(s) Oral daily  lactated ringers. 1000 milliLiter(s) (75 mL/Hr) IV Continuous <Continuous>  losartan 100 milliGRAM(s) Oral daily  metoprolol tartrate 25 milliGRAM(s) Oral two times a day  tamsulosin 0.4 milliGRAM(s) Oral at bedtime    MEDICATIONS  (PRN):  aluminum hydroxide/magnesium hydroxide/simethicone Suspension 30 milliLiter(s) Oral every 4 hours PRN Dyspepsia  ondansetron Injectable 4 milliGRAM(s) IV Push every 6 hours PRN Nausea    ROS: as above; all other systems reviewed and wnl      PHYSICAL EXAMINATION:  Vital Signs Last 24 Hrs  T(C): 36.9 (02 Jul 2020 04:30), Max: 37 (01 Jul 2020 14:01)  T(F): 98.4 (02 Jul 2020 04:30), Max: 98.6 (01 Jul 2020 14:01)  HR: 72 (02 Jul 2020 04:30) (61 - 85)  BP: 156/78 (02 Jul 2020 04:30) (135/76 - 170/84)  RR: 17 (02 Jul 2020 04:30) (11 - 19)  SpO2: 93% (02 Jul 2020 04:30) (90% - 100%)    GENERAL: elderly male, NAD  HEENT: mucous membranes dry  CHEST: respirations unlabored  HEART: HR 70s  ABDOMEN: laparoscopic surgical wound w/wound closures in place; marked distention  EXTREMITIES: no edema b/l LEs, no calf tenderness  NEURO: awake, alert, interactive; moves all extrem      LABS:                        13.7   8.32  )-----------( 219      ( 02 Jul 2020 06:56 )             39.1     07-02    140  |  107  |  13  ----------------------------<  120<H>  3.9   |  28  |  0.89    Ca    8.4<L>      02 Jul 2020 06:56  Phos  3.0     07-02  Mg     2.2     07-02

## 2020-07-03 DIAGNOSIS — K56.7 ILEUS, UNSPECIFIED: ICD-10-CM

## 2020-07-03 LAB
ANION GAP SERPL CALC-SCNC: 6 MMOL/L — SIGNIFICANT CHANGE UP (ref 5–17)
BASE EXCESS BLDV CALC-SCNC: 5 MMOL/L — HIGH (ref -2–2)
BLOOD GAS COMMENTS, VENOUS: SIGNIFICANT CHANGE UP
BUN SERPL-MCNC: 10 MG/DL — SIGNIFICANT CHANGE UP (ref 7–23)
CALCIUM SERPL-MCNC: 8.2 MG/DL — LOW (ref 8.5–10.1)
CHLORIDE SERPL-SCNC: 107 MMOL/L — SIGNIFICANT CHANGE UP (ref 96–108)
CO2 SERPL-SCNC: 29 MMOL/L — SIGNIFICANT CHANGE UP (ref 22–31)
CREAT SERPL-MCNC: 0.82 MG/DL — SIGNIFICANT CHANGE UP (ref 0.5–1.3)
GLUCOSE SERPL-MCNC: 98 MG/DL — SIGNIFICANT CHANGE UP (ref 70–99)
HCO3 BLDV-SCNC: 27 MMOL/L — SIGNIFICANT CHANGE UP (ref 21–29)
HCT VFR BLD CALC: 38.9 % — LOW (ref 39–50)
HGB BLD-MCNC: 14 G/DL — SIGNIFICANT CHANGE UP (ref 13–17)
HOROWITZ INDEX BLDV+IHG-RTO: 21 — SIGNIFICANT CHANGE UP
MAGNESIUM SERPL-MCNC: 2.1 MG/DL — SIGNIFICANT CHANGE UP (ref 1.6–2.6)
MCHC RBC-ENTMCNC: 30.6 PG — SIGNIFICANT CHANGE UP (ref 27–34)
MCHC RBC-ENTMCNC: 36 GM/DL — SIGNIFICANT CHANGE UP (ref 32–36)
MCV RBC AUTO: 85.1 FL — SIGNIFICANT CHANGE UP (ref 80–100)
NRBC # BLD: 0 /100 WBCS — SIGNIFICANT CHANGE UP (ref 0–0)
NT-PROBNP SERPL-SCNC: 517 PG/ML — HIGH (ref 0–450)
PCO2 BLDV: 54 MMHG — HIGH (ref 35–50)
PH BLDV: 7.37 — SIGNIFICANT CHANGE UP (ref 7.35–7.45)
PHOSPHATE SERPL-MCNC: 3.2 MG/DL — SIGNIFICANT CHANGE UP (ref 2.5–4.5)
PLATELET # BLD AUTO: 220 K/UL — SIGNIFICANT CHANGE UP (ref 150–400)
PO2 BLDV: <44 MMHG — SIGNIFICANT CHANGE UP (ref 25–45)
POTASSIUM SERPL-MCNC: 3.3 MMOL/L — LOW (ref 3.5–5.3)
POTASSIUM SERPL-SCNC: 3.3 MMOL/L — LOW (ref 3.5–5.3)
RBC # BLD: 4.57 M/UL — SIGNIFICANT CHANGE UP (ref 4.2–5.8)
RBC # FLD: 13.1 % — SIGNIFICANT CHANGE UP (ref 10.3–14.5)
SAO2 % BLDV: 67 % — SIGNIFICANT CHANGE UP (ref 67–88)
SODIUM SERPL-SCNC: 142 MMOL/L — SIGNIFICANT CHANGE UP (ref 135–145)
TSH SERPL-MCNC: 2.89 UIU/ML — SIGNIFICANT CHANGE UP (ref 0.36–3.74)
WBC # BLD: 8.34 K/UL — SIGNIFICANT CHANGE UP (ref 3.8–10.5)
WBC # FLD AUTO: 8.34 K/UL — SIGNIFICANT CHANGE UP (ref 3.8–10.5)

## 2020-07-03 PROCEDURE — 99232 SBSQ HOSP IP/OBS MODERATE 35: CPT

## 2020-07-03 PROCEDURE — 99233 SBSQ HOSP IP/OBS HIGH 50: CPT

## 2020-07-03 RX ORDER — BUDESONIDE AND FORMOTEROL FUMARATE DIHYDRATE 160; 4.5 UG/1; UG/1
2 AEROSOL RESPIRATORY (INHALATION)
Refills: 0 | Status: DISCONTINUED | OUTPATIENT
Start: 2020-07-03 | End: 2020-07-06

## 2020-07-03 RX ORDER — ALBUTEROL 90 UG/1
2 AEROSOL, METERED ORAL EVERY 4 HOURS
Refills: 0 | Status: DISCONTINUED | OUTPATIENT
Start: 2020-07-03 | End: 2020-07-06

## 2020-07-03 RX ORDER — POTASSIUM CHLORIDE 20 MEQ
40 PACKET (EA) ORAL EVERY 4 HOURS
Refills: 0 | Status: COMPLETED | OUTPATIENT
Start: 2020-07-03 | End: 2020-07-03

## 2020-07-03 RX ADMIN — BUDESONIDE AND FORMOTEROL FUMARATE DIHYDRATE 2 PUFF(S): 160; 4.5 AEROSOL RESPIRATORY (INHALATION) at 21:11

## 2020-07-03 RX ADMIN — ATORVASTATIN CALCIUM 10 MILLIGRAM(S): 80 TABLET, FILM COATED ORAL at 21:11

## 2020-07-03 RX ADMIN — Medication 40 MILLIEQUIVALENT(S): at 17:40

## 2020-07-03 RX ADMIN — FINASTERIDE 5 MILLIGRAM(S): 5 TABLET, FILM COATED ORAL at 11:08

## 2020-07-03 RX ADMIN — SODIUM CHLORIDE 75 MILLILITER(S): 9 INJECTION, SOLUTION INTRAVENOUS at 06:29

## 2020-07-03 RX ADMIN — Medication 600 MILLIGRAM(S): at 17:39

## 2020-07-03 RX ADMIN — AMLODIPINE BESYLATE 10 MILLIGRAM(S): 2.5 TABLET ORAL at 05:19

## 2020-07-03 RX ADMIN — LOSARTAN POTASSIUM 100 MILLIGRAM(S): 100 TABLET, FILM COATED ORAL at 05:19

## 2020-07-03 RX ADMIN — FAMOTIDINE 20 MILLIGRAM(S): 10 INJECTION INTRAVENOUS at 05:20

## 2020-07-03 RX ADMIN — TAMSULOSIN HYDROCHLORIDE 0.4 MILLIGRAM(S): 0.4 CAPSULE ORAL at 21:11

## 2020-07-03 RX ADMIN — Medication 25 MILLIGRAM(S): at 05:19

## 2020-07-03 RX ADMIN — Medication 25 MILLIGRAM(S): at 17:39

## 2020-07-03 RX ADMIN — Medication 40 MILLIEQUIVALENT(S): at 16:35

## 2020-07-03 RX ADMIN — FAMOTIDINE 20 MILLIGRAM(S): 10 INJECTION INTRAVENOUS at 17:40

## 2020-07-03 NOTE — PROGRESS NOTE ADULT - SUBJECTIVE AND OBJECTIVE BOX
CC/F/U for:     HPI:  87 yo M PMHx of CAD s/p MI and stents to RCA and LAD, COPD (on bipap at night), HTN, HLD, BPH presents to the ED with 2 days of abdominal pain and N/V. Patient states that for the past 2 days he has been having epigastric burning with some lower abdominal pain. Has not had anything PO in the past two days due to nausea and vomiting. States that had 6-7 episodes of emesis today. States that it is brown in color. Also admits to some loose stools since yesterday. States that his symptoms have improved following the pepcid and zofran. Admits to some chest discomfort and pain in his shoulders b/l. Admits to some mild shortness of breath, which is chronic. Had a nuclear stress on 6/24 which was unchanged from his previous test. Denies fever, chills, headache, dizziness, cough.    IN THE ED:  Temp 98.7  F , HR 70 , BP  153/80  ,RR 18 , SpO2 98% on RA  S/P pepcid and zofran, 1L IVF  EKG: NSR, LAFB  Labs significant for WBC 13.81, BUN 29, Glucose 145, bilirubin 1.4, toponin .000  CT A/P: IMPRESSION: Distal small bowel obstruction. Mild interloop edema/fluid.    Surgical team saw pt in the ED, did not recommend NG tube or any other intervention for now. (26 Jun 2020 21:48)        INTERVAL HPI/OVERNIGHT EVENTS:  Pt seen and examined at bedside.     Allergies/Intolerance: penicillin (Hives)      MEDICATIONS  (STANDING):  amLODIPine   Tablet 10 milliGRAM(s) Oral daily  atorvastatin 10 milliGRAM(s) Oral at bedtime  budesonide  80 MICROgram(s)/formoterol 4.5 MICROgram(s) Inhaler 2 Puff(s) Inhalation two times a day  famotidine Injectable 20 milliGRAM(s) IV Push every 12 hours  finasteride 5 milliGRAM(s) Oral daily  guaiFENesin  milliGRAM(s) Oral every 12 hours  losartan 100 milliGRAM(s) Oral daily  metoprolol tartrate 25 milliGRAM(s) Oral two times a day  tamsulosin 0.4 milliGRAM(s) Oral at bedtime    MEDICATIONS  (PRN):  ALBUTerol    90 MICROgram(s) HFA Inhaler 2 Puff(s) Inhalation every 4 hours PRN Shortness of Breath and/or Wheezing  aluminum hydroxide/magnesium hydroxide/simethicone Suspension 30 milliLiter(s) Oral every 4 hours PRN Dyspepsia  ondansetron Injectable 4 milliGRAM(s) IV Push every 6 hours PRN Nausea        ROS: as above; all other systems reviewed and wnl      PHYSICAL EXAMINATION:  Vital Signs Last 24 Hrs  T(C): 36.9 (03 Jul 2020 05:01), Max: 37.1 (02 Jul 2020 21:55)  T(F): 98.4 (03 Jul 2020 05:01), Max: 98.7 (02 Jul 2020 21:55)  HR: 65 (03 Jul 2020 05:01) (60 - 65)  BP: 135/72 (03 Jul 2020 05:01) (125/71 - 146/76)  BP(mean): --  RR: 20 (03 Jul 2020 05:01) (20 - 20)  SpO2: 94% (03 Jul 2020 05:01) (94% - 94%)  CAPILLARY BLOOD GLUCOSE          GENERAL: NAD  HEENT: mucous membranes dry  CHEST: respirations unlabored  HEART: HR s  ABDOMEN: soft, ND, NT   EXTREMITIES:  no edema b/l LEs, no calf tenderness  NEURO: awake, alert, interactive; moves all extremities      LABS:                        14.0   8.34  )-----------( 220      ( 03 Jul 2020 06:47 )             38.9     07-03    142  |  107  |  10  ----------------------------<  98  3.3<L>   |  29  |  0.82    Ca    8.2<L>      03 Jul 2020 06:47  Phos  3.2     07-03  Mg     2.1     07-03              RADIOLOGY & ADDITIONAL TESTS:      ASSESSMENT AND PLAN:  87y Male CC/F/U for: ileus    HPI:  85 yo M PMHx of CAD s/p MI and stents to RCA and LAD, COPD (on bipap at night), HTN, HLD, BPH presents to the ED with 2 days of abdominal pain and N/V. Patient states that for the past 2 days he has been having epigastric burning with some lower abdominal pain. Has not had anything PO in the past two days due to nausea and vomiting. States that had 6-7 episodes of emesis today. States that it is brown in color. Also admits to some loose stools since yesterday. States that his symptoms have improved following the pepcid and zofran. Admits to some chest discomfort and pain in his shoulders b/l. Admits to some mild shortness of breath, which is chronic. Had a nuclear stress on 6/24 which was unchanged from his previous test. Denies fever, chills, headache, dizziness, cough.    IN THE ED:  Temp 98.7  F , HR 70 , BP  153/80  ,RR 18 , SpO2 98% on RA  S/P pepcid and zofran, 1L IVF  EKG: NSR, LAFB  Labs significant for WBC 13.81, BUN 29, Glucose 145, bilirubin 1.4, toponin .000  CT A/P: IMPRESSION: Distal small bowel obstruction. Mild interloop edema/fluid.    Surgical team saw pt in the ED, did not recommend NG tube or any other intervention for now. (26 Jun 2020 21:48)      INTERVAL HPI/OVERNIGHT EVENTS:  Pt seen and examined at bedside - tolerated clears, advanced to full liquids per Surgery.     Allergies/Intolerance: penicillin (Hives)      MEDICATIONS  (STANDING):  amLODIPine   Tablet 10 milliGRAM(s) Oral daily  atorvastatin 10 milliGRAM(s) Oral at bedtime  budesonide  80 MICROgram(s)/formoterol 4.5 MICROgram(s) Inhaler 2 Puff(s) Inhalation two times a day  famotidine Injectable 20 milliGRAM(s) IV Push every 12 hours  finasteride 5 milliGRAM(s) Oral daily  guaiFENesin  milliGRAM(s) Oral every 12 hours  losartan 100 milliGRAM(s) Oral daily  metoprolol tartrate 25 milliGRAM(s) Oral two times a day  tamsulosin 0.4 milliGRAM(s) Oral at bedtime    MEDICATIONS  (PRN):  ALBUTerol    90 MICROgram(s) HFA Inhaler 2 Puff(s) Inhalation every 4 hours PRN Shortness of Breath and/or Wheezing  aluminum hydroxide/magnesium hydroxide/simethicone Suspension 30 milliLiter(s) Oral every 4 hours PRN Dyspepsia  ondansetron Injectable 4 milliGRAM(s) IV Push every 6 hours PRN Nausea      ROS: as above; all other systems reviewed and wnl      PHYSICAL EXAMINATION:  Vital Signs Last 24 Hrs  T(C): 36.9 (03 Jul 2020 05:01), Max: 37.1 (02 Jul 2020 21:55)  T(F): 98.4 (03 Jul 2020 05:01), Max: 98.7 (02 Jul 2020 21:55)  HR: 65 (03 Jul 2020 05:01) (60 - 65)  BP: 135/72 (03 Jul 2020 05:01) (125/71 - 146/76)  RR: 20 (03 Jul 2020 05:01) (20 - 20)  SpO2: 94% (03 Jul 2020 05:01) (94% - 94%)    GENERAL: elderly male, NAD  HEENT: mucous membranes dry  CHEST: respirations unlabored  HEART: HR 60s  ABDOMEN: laparoscopic surgical c/d/i; marked distention, soft, NT to palpation  EXTREMITIES: no edema b/l LEs, no calf tenderness  NEURO: awake, alert, interactive; moves all extrem      LABS:                        14.0   8.34  )-----------( 220      ( 03 Jul 2020 06:47 )             38.9     07-03    142  |  107  |  10  ----------------------------<  98  3.3<L>   |  29  |  0.82    Ca    8.2<L>      03 Jul 2020 06:47  Phos  3.2     07-03  Mg     2.1     07-03

## 2020-07-03 NOTE — PROGRESS NOTE ADULT - SUBJECTIVE AND OBJECTIVE BOX
[facility] -- Makayla Malik, Denise Randall, Tejas Comer Savella  Office # [phone]      Follow Up:  CAD    Subjective/Observations: Seen and examined.  Sitting up in chair.  No new complaints.  Denies cp, sob or palpitations.  Tolerating diet without N/V.  No events overnight.  NAD.        REVIEW OF SYSTEMS: All other review of systems is negative unless indicated above    PAST MEDICAL & SURGICAL HISTORY:  Pulmonary hypertension: moderate  COPD (chronic obstructive pulmonary disease): mild (no home oxygen)  Anxiety  CAD (coronary artery disease)  BPH (Benign Prostatic Hyperplasia)  Hyperlipemia  Hypertension  Detached retina, right: with repair  Bilateral cataracts: removed  H/O bilateral hip replacements      MEDICATIONS  (STANDING):  amLODIPine   Tablet 10 milliGRAM(s) Oral daily  atorvastatin 10 milliGRAM(s) Oral at bedtime  budesonide  80 MICROgram(s)/formoterol 4.5 MICROgram(s) Inhaler 2 Puff(s) Inhalation two times a day  famotidine Injectable 20 milliGRAM(s) IV Push every 12 hours  finasteride 5 milliGRAM(s) Oral daily  guaiFENesin  milliGRAM(s) Oral every 12 hours  losartan 100 milliGRAM(s) Oral daily  metoprolol tartrate 25 milliGRAM(s) Oral two times a day  tamsulosin 0.4 milliGRAM(s) Oral at bedtime    MEDICATIONS  (PRN):  ALBUTerol    90 MICROgram(s) HFA Inhaler 2 Puff(s) Inhalation every 4 hours PRN Shortness of Breath and/or Wheezing  aluminum hydroxide/magnesium hydroxide/simethicone Suspension 30 milliLiter(s) Oral every 4 hours PRN Dyspepsia  ondansetron Injectable 4 milliGRAM(s) IV Push every 6 hours PRN Nausea      Allergies    penicillin (Hives)    Intolerances            Vital Signs Last 24 Hrs  T(C): 36.9 (03 Jul 2020 05:01), Max: 37.1 (02 Jul 2020 21:55)  T(F): 98.4 (03 Jul 2020 05:01), Max: 98.7 (02 Jul 2020 21:55)  HR: 65 (03 Jul 2020 05:01) (60 - 65)  BP: 135/72 (03 Jul 2020 05:01) (125/71 - 146/76)  BP(mean): --  RR: 20 (03 Jul 2020 05:01) (20 - 20)  SpO2: 94% (03 Jul 2020 05:01) (94% - 94%)    I&O's Summary    02 Jul 2020 07:01  -  03 Jul 2020 07:00  --------------------------------------------------------  IN: 900 mL / OUT: 2400 mL / NET: -1500 mL          PHYSICAL EXAM:  TELE: Not on tele   Constitutional: NAD, awake and alert, well-developed  HEENT: Moist Mucous Membranes, Anicteric  Pulmonary: Non-labored, breath sounds with rhonchi Left > right  Cardiovascular: Regular, S1 and S2, No murmurs, rubs, gallops or clicks  Gastrointestinal: Bowel Sounds present, Abdomen distended somewhat soft, nontender ss intact   Lymph: No peripheral edema. No lymphadenopathy.  Skin: No visible rashes or ulcers.  Psych:  Mood & affect appropriate    LABS: All Labs Reviewed:                        14.0   8.34  )-----------( 220      ( 03 Jul 2020 06:47 )             38.9                         13.7   8.32  )-----------( 219      ( 02 Jul 2020 06:56 )             39.1                         13.2   8.68  )-----------( 184      ( 01 Jul 2020 07:39 )             37.1     03 Jul 2020 06:47    142    |  107    |  10     ----------------------------<  98     3.3     |  29     |  0.82   02 Jul 2020 06:56    140    |  107    |  13     ----------------------------<  120    3.9     |  28     |  0.89   01 Jul 2020 07:39    139    |  106    |  13     ----------------------------<  98     3.3     |  29     |  0.82     Ca    8.2        03 Jul 2020 06:47  Ca    8.4        02 Jul 2020 06:56  Ca    8.0        01 Jul 2020 07:39  Phos  3.2       03 Jul 2020 06:47  Phos  3.0       02 Jul 2020 06:56  Mg     2.1       03 Jul 2020 06:47  Mg     2.2       02 Jul 2020 06:56    < from: 12 Lead ECG (06.26.20 @ 14:12) >  Ventricular Rate 71 BPM    Atrial Rate 71 BPM    P-R Interval 152 ms    QRS Duration 98 ms    Q-T Interval 400 ms    QTC Calculation(Bezet) 434 ms    P Axis 53 degrees    R Axis -46 degrees    T Axis 44 degrees    Diagnosis Line Normal sinus rhythm  Left anterior fascicular block  Abnormal ECG  No previous ECGs available  Confirmed by Eboni Martínez MD ([id number]) on 6/26/2020 4:41:12 PM    < end of copied text >      < from: CT Enterography w/ Oral Cont and w/ IV Cont (06.30.20 @ 13:00) >  EXAM:  CT ENTEROGRAPHY OC IC                            PROCEDURE DATE:  06/30/2020          INTERPRETATION:   CT ABDOMEN AND PELVIS    CLINICAL INFORMATION:  Abdominal distention, follow-up small bowel obstruction.    PROCEDURE: CT enterography isperformed following the oral ministration of negative contrast and arterial and portal venous phase imaging.  Axial images are obtained at 3.75 mm intervals with coronal sagittal reformatted images.    COMPARISON: CT scans abdomen pelvis 6/29/2020 6/28/2020.    FINDINGS:  The stomach is moderately distended.  There is a small hiatal hernia.    There are persistent fluid-filled dilated small bowel loops. There is a transition in the distal small bowel anteriorly right lower quadrant, as seen on series 2, images 80 through 83 and series 602, images 33-35.  There is an abrupt angulation of the small bowel anteriorly at this level.  No intraluminal mass or focal bowel wall thickening is noted.  Previously administered enteric contrast has transited to the colon, including rectosigmoid colon.  There is mild mesenteric edema/fluid.    Colonic diverticulosis is noted without evidence of diverticulitis.  There is a normal-appearing appendix.    Other findings are stable from prior exam.    Impression:    CT findings compatible with partial distal small bowel obstruction with change in caliber anteriorly within the right lower quadrant.                                AMARI CABALLERO M.D., ATTENDING RADIOLOGIST  This document has been electronically signed. Jun 30 2020  1:40PM                < end of copied text >      < from: Xray Abdomen 1 View (06.30.20 @ 08:18) >  EXAM:  XR ABDOMEN 1 VIEW                            PROCEDURE DATE:  06/30/2020          INTERPRETATION:  Abdomen one view    HISTORY: Follow-up small bowel obstruction    Comparison: 6/29/2020    Frontal view of the abdomen shows similar pattern of small bowel dilatation. Colonic caliber is normal. No definite free air is identified. Bilateral hip replacements are again noted.    IMPRESSION:  Similar small bowel obstruction pattern.    Thank you for this referral.                CALVIN BAINS M.D., ATTENDING RADIOLOGIST  This document has been electronically signed. Jun 30 2020 11:00AM        < end of copied text >

## 2020-07-03 NOTE — PROGRESS NOTE ADULT - ATTENDING COMMENTS
86M, HTN, HL, CAD/stents, COPD/nocturnal BiPAP, BPH; adm n/v/abd pain - CT c/f SBO - failed conservative mgmt w/repeat CTAP showing continued SBO - s/p OR 7/1 for exlap w/neg findings - c/f ileus    Ileus - failed conservative mgmt for SBO  -no e/o colonic obstructive process - s/p exlap 7/1 w/neg findings for SBO   -postop - started passing flatus, diet advanced - now on full liquids - monitoring tolerance  -prn analgesia  -prn antiemetics    volume depletion - gentle IVFs    HTN, HL, CAD - continued lipitor, metoprolol, norvasc, losartan; holding asa perioperatively    COPD - stable; no e/o decompensation - continue nocturnal BiPAP; Pulm consulted to optimize med regimen    BPH - continued regimen of flomax, finasteride    dvt prophy

## 2020-07-03 NOTE — PROGRESS NOTE ADULT - SUBJECTIVE AND OBJECTIVE BOX
INTERVAL HPI/OVERNIGHT EVENTS:  pt seen and examined  sp OR   denies n/v/abd pain  passing gas  wants reg diet    MEDICATIONS  (STANDING):  amLODIPine   Tablet 10 milliGRAM(s) Oral daily  atorvastatin 10 milliGRAM(s) Oral at bedtime  famotidine Injectable 20 milliGRAM(s) IV Push every 12 hours  finasteride 5 milliGRAM(s) Oral daily  lactated ringers. 1000 milliLiter(s) (75 mL/Hr) IV Continuous <Continuous>  losartan 100 milliGRAM(s) Oral daily  metoprolol tartrate 25 milliGRAM(s) Oral two times a day  tamsulosin 0.4 milliGRAM(s) Oral at bedtime    MEDICATIONS  (PRN):  aluminum hydroxide/magnesium hydroxide/simethicone Suspension 30 milliLiter(s) Oral every 4 hours PRN Dyspepsia  ondansetron Injectable 4 milliGRAM(s) IV Push every 6 hours PRN Nausea      Allergies    penicillin (Hives)    Intolerances        Review of Systems:    General:  No wt loss, fevers, chills, night sweats, fatigue   Eyes:  Good vision, no reported pain  ENT:  No sore throat, pain, runny nose, dysphagia  CV:  No pain, palpitations, hypo/hypertension  Resp:  No dyspnea, cough, tachypnea, wheezing  GI:  No pain, No nausea, No vomiting, No diarrhea, No constipation, No weight loss, No fever, No pruritis, No rectal bleeding, No melena, No dysphagia  :  No pain, bleeding, incontinence, nocturia  Muscle:  No pain, weakness  Neuro:  No weakness, tingling, memory problems  Psych:  No fatigue, insomnia, mood problems, depression  Endocrine:  No polyuria, polydypsia, cold/heat intolerance  Heme:  No petechiae, ecchymosis, easy bruisability  Skin:  No rash, tattoos, scars, edema      Vital Signs Last 24 Hrs  T(C): 36.9 (02 Jul 2020 04:30), Max: 37.3 (01 Jul 2020 10:36)  T(F): 98.4 (02 Jul 2020 04:30), Max: 99.1 (01 Jul 2020 10:36)  HR: 72 (02 Jul 2020 04:30) (58 - 85)  BP: 156/78 (02 Jul 2020 04:30) (119/73 - 170/84)  BP(mean): --  RR: 17 (02 Jul 2020 04:30) (11 - 19)  SpO2: 93% (02 Jul 2020 04:30) (90% - 100%)    PHYSICAL EXAM:    Constitutional: NAD  HEENT: ncat  Gastrointestinal: soft nt +dt  Extremities: No peripheral edema  Vascular: + peripheral pulses  Neurological: A/O x 3    LABS:                        13.7   8.32  )-----------( 219      ( 02 Jul 2020 06:56 )             39.1     07-02    140  |  107  |  13  ----------------------------<  120<H>  3.9   |  28  |  0.89    Ca    8.4<L>      02 Jul 2020 06:56  Phos  3.0     07-02  Mg     2.2     07-02            RADIOLOGY & ADDITIONAL TESTS:

## 2020-07-03 NOTE — PROGRESS NOTE ADULT - PROBLEM SELECTOR PLAN 1
7/1 sp ex lap  passing flatus; f/u surgery recs  prn pain control/anti emetics  diet as per sx  monitor gi fxn  op gi f/u upon dc

## 2020-07-03 NOTE — CONSULT NOTE ADULT - SUBJECTIVE AND OBJECTIVE BOX
87 y/o M pmhx, HTN, HLD, CAD, COPD, hx of stents placed, transferred from [facility] c/o epigastric abdominal discomfort with N/V since Thursday. Pt describes the vomit as "brownish liquid". Pt states he hasnt eaten since Tuesday as he underwent a chemical stress test on Wednesday which he initially attributed symptoms to. Pt also c/o episodes of watery diarrhea since onset of symptoms and admits to passing flatus at this time. PT currently denies N/V, fevers, chills, chest pain, SOB, melena, hematochezia, palpitations, or hx of similar symptoms in the past. Pt with no abdominal surgical hx, and denies any unintentional weight loss.     PAST MEDICAL & SURGICAL HISTORY:  Pulmonary hypertension: moderate  COPD (chronic obstructive pulmonary disease): mild (no home oxygen)  Anxiety  CAD (coronary artery disease)  BPH (Benign Prostatic Hyperplasia)  Hyperlipemia  Hypertension  Detached retina, right: with repair  Bilateral cataracts: removed  H/O bilateral hip replacements      MEDICATIONS  (STANDING):  amLODIPine   Tablet 10 milliGRAM(s) Oral daily  aspirin 325 milliGRAM(s) Oral at bedtime  atorvastatin 10 milliGRAM(s) Oral at bedtime  famotidine Injectable 20 milliGRAM(s) IV Push every 12 hours  finasteride 5 milliGRAM(s) Oral daily  furosemide    Tablet 40 milliGRAM(s) Oral daily  losartan 100 milliGRAM(s) Oral daily  metoprolol tartrate 25 milliGRAM(s) Oral two times a day  tamsulosin 0.4 milliGRAM(s) Oral at bedtime    MEDICATIONS  (PRN):      amLODIPine   Tablet 10 milliGRAM(s) Oral daily  aspirin 325 milliGRAM(s) Oral at bedtime  atorvastatin 10 milliGRAM(s) Oral at bedtime  famotidine Injectable 20 milliGRAM(s) IV Push every 12 hours  finasteride 5 milliGRAM(s) Oral daily  furosemide    Tablet 40 milliGRAM(s) Oral daily  losartan 100 milliGRAM(s) Oral daily  metoprolol tartrate 25 milliGRAM(s) Oral two times a day  tamsulosin 0.4 milliGRAM(s) Oral at bedtime      penicillin (Hives)      Social history:  Former smoker quit 30 years ago  Denies alcohol use  Denies illicit substance use    FAMILY HISTORY:  Family history of heart disease  Family history of hypertension in mother    Vital Signs Last 24 Hrs  T(C): 36.8 (26 Jun 2020 21:49), Max: 37.1 (26 Jun 2020 18:35)  T(F): 98.2 (26 Jun 2020 21:49), Max: 98.7 (26 Jun 2020 18:35)  HR: 75 (26 Jun 2020 21:49) (66 - 78)  BP: 148/71 (26 Jun 2020 21:49) (122/69 - 169/79)  BP(mean): --  RR: 19 (26 Jun 2020 21:49) (16 - 19)  SpO2: 97% (26 Jun 2020 21:49) (95% - 98%)    ROS  General: No acute distress, awake and alert  Head: Normal cephalic/atraumatic  Neck: Denies neck pain or palpable masses, hoarseness or stridor  Lymphatic: Denies cervical or axillary or femoral lymphadenopathy  Chest: No chest pain, cough or hemoptysis  Heart: No chest pain, palpitations  Abdomen: SEE HPI  Extremities: Denies cyanosis, open sores or swollen extremity  Neuro: Denies weakness, paralysis, syncope, loss of vision  Psych: Denies hallucinations, visual disturbances, or depression    PHYSICAL EXAM:  GENERAL: NAD, well-developed  HEAD:  Atraumatic, Normocephalic  EYES: EOMI, PERRLA, conjunctiva and sclera clear  NECK: Supple, No JVD  CHEST/LUNG: CTABL, no ronchi, rales or wheezing  HEART: RRR, no MRG  ABDOMEN: Soft, protuberant (however not distended per pt), nontender, hypoactive bs  EXTREMITIES:  2+ Peripheral Pulses, No clubbing, cyanosis, or edema  PSYCH: AAOx3  NEUROLOGY: non-focal  SKIN: No rashes or lesions    LABS: ALL [id number] STUDIES WERE REVIEWED IN THEIR ENTIRETY                        15.6   13.81 )-----------( 212      ( 26 Jun 2020 14:33 )             45.6     06-26    138  |  101  |  29<H>  ----------------------------<  145<H>  4.0   |  27  |  1.22    Ca    9.0      26 Jun 2020 14:33    TPro  7.5  /  Alb  3.7  /  TBili  1.4<H>  /  DBili  x   /  AST  16  /  ALT  20  /  AlkPhos  76  06-26    PT/INR - ( 26 Jun 2020 17:34 )   PT: 13.5 sec;   INR: 1.21 ratio         PTT - ( 26 Jun 2020 17:34 )  PTT:27.0 sec    Imaging:  < from: CT Abdomen and Pelvis w/ IV Cont (06.26.20 @ 15:58) >  EXAM:  CT ABDOMEN AND PELVIS IC                                  PROCEDURE DATE:  06/26/2020          INTERPRETATION:  CLINICAL INFORMATION: Abdominal pain with nausea vomiting and diarrhea.    COMPARISON: None.    PROCEDURE:   CT of the Abdomen and Pelvis was performed with intravenous contrast.   Intravenous contrast: 90 ml Omnipaque 350. 10 ml discarded.  Oral contrast: None.  Sagittal and coronal reformats were performed.    FINDINGS:    LOWER CHEST: Scarring/fibrosis left lower lobe.    LIVER: Within normal limits.  BILE DUCTS: Normal caliber.  GALLBLADDER: Distended. No wall thickening.  SPLEEN: Within normal limits.  PANCREAS: Atrophic.  ADRENALS: Within normal limits.  KIDNEYS/URETERS:   Tiny indeterminate hypodense lesion upper pole right kidney.  No hydronephrosis.    Evaluation of the pelvis is limited secondary to metallic streak artifact related to patient's bilateral hip arthroplasties.  BLADDER: Limited.  REPRODUCTIVE ORGANS: Limited.    BOWEL: Small hiatal hernia with mildly distended fluid-filled esophagus.  There is a fluid-filled distended stomach.  There are dilated fluid-filled loops of small bowel with transition in the right lower quadrant and collapsed distal ileum; compatible with small bowel obstruction.  There is prominent sigmoid diverticulosis, without CT evidence of diverticulitis.  There is a normal-appearing appendix.  PERITONEUM: There is mild interloop mesenteric edema/stranding. No free intraperitoneal air is noted.    VESSELS: Within normal limits.  RETROPERITONEUM/LYMPH NODES: Atherosclerotic calcification.    ABDOMINAL WALL: Within normal limits.    BONES: Multilevel degenerative changes of the spine with anterior spondylolisthesis L4 on L5.  Partially imaged are total bilateral hip arthroplasties.    IMPRESSION:     Distal small bowel obstruction.  Mild interloop edema/fluid.    Other findings as discussed above.    AMARI CABALLERO M.D., ATTENDING RADIOLOGIST  This document has been electronically signed. Jun 26 2020  4:22PM        < end of copied text >        Assessment:  87 y/o M with extensive pmhx, transferred from [facility] c/o N/V with abdominal discomfort since Thursday, and CT abd/pelv findings consistent with SBO with transition point in RLQ with collapsed distal ileum,    Plan:  - admit to medicine  - NPO, IVF, IV home meds  - will hold off on NGT for as N/V has subsided and pt is admits to flatus and continued water bm's  - serial abdominal exams  - will follow  - zofran prn nausea  - discussed with Dr. Simpson
CHIEF COMPLAINT: urinary incontinence    HISTORY OF PRESENT ILLNESS: 87 y/o man, pt of Dr JANELL Peace, has a long hx of urge inconinence and BPH. He is maitained at home on finasteride, flomax, and myrbetriq. Since admission, he has not been on myrbetriq, and has had more episodes of UUI. He has a sbo, and is schedduled for surgery today. He denies hematuria, dysuria or stranguria.    PAST MEDICAL & SURGICAL HISTORY:  Pulmonary hypertension: moderate  COPD (chronic obstructive pulmonary disease): mild (no home oxygen)  Anxiety  CAD (coronary artery disease)  BPH (Benign Prostatic Hyperplasia)  Hyperlipemia  Hypertension  Detached retina, right: with repair  Bilateral cataracts: removed  H/O bilateral hip replacements      REVIEW OF SYSTEMS:    CONSTITUTIONAL: No weakness, fevers or chills  EYES/ENT: No visual changes;  No vertigo or throat pain   NECK: No pain or stiffness  RESPIRATORY: No cough, wheezing, hemoptysis; No shortness of breath  CARDIOVASCULAR: No chest pain or palpitations  GASTROINTESTINAL: intermittent pain and abdominal swelling  GENITOURINARY: No dysuria, or hematuria  NEUROLOGICAL: No numbness or weakness  SKIN: No itching, burning, rashes, or lesions   All other review of systems is negative unless indicated above.    MEDICATIONS  (STANDING):  amLODIPine   Tablet 10 milliGRAM(s) Oral daily  atorvastatin 10 milliGRAM(s) Oral at bedtime  cefoTEtan  IVPB 2 Gram(s) IV Intermittent once  famotidine Injectable 20 milliGRAM(s) IV Push every 12 hours  finasteride 5 milliGRAM(s) Oral daily  lactated ringers. 1000 milliLiter(s) (50 mL/Hr) IV Continuous <Continuous>  losartan 100 milliGRAM(s) Oral daily  metoprolol tartrate 25 milliGRAM(s) Oral two times a day  oxybutynin 5 milliGRAM(s) Oral two times a day  tamsulosin 0.4 milliGRAM(s) Oral at bedtime    MEDICATIONS  (PRN):  aluminum hydroxide/magnesium hydroxide/simethicone Suspension 30 milliLiter(s) Oral every 4 hours PRN Dyspepsia  ondansetron Injectable 4 milliGRAM(s) IV Push every 6 hours PRN Nausea      Allergies    penicillin (Hives)    Intolerances        SOCIAL HISTORY:    FAMILY HISTORY:  Family history of heart disease  Family history of hypertension in mother      Vital Signs Last 24 Hrs  T(C): 36.9 (01 Jul 2020 04:50), Max: 37.1 (30 Jun 2020 13:47)  T(F): 98.4 (01 Jul 2020 04:50), Max: 98.7 (30 Jun 2020 13:47)  HR: 67 (01 Jul 2020 04:50) (67 - 78)  BP: 134/75 (01 Jul 2020 04:50) (134/75 - 150/78)  BP(mean): --  RR: 20 (01 Jul 2020 04:50) (20 - 20)  SpO2: 93% (01 Jul 2020 04:50) (90% - 93%)    PHYSICAL EXAM:    Constitutional: NAD, well-developed  HEENT: CHAS, EOMI, Normal Hearing, MMM  Neck: No LAD, No JVD  Back: Normal spine flexure, No CVA tenderness  Respiratory: not using accessory muscles   Cardiovascular: S1 and S2, RRR, no M/G/R  Abd: Bsoft, distended, non tender, no rebound or guarding  : Normal phallus,open meatus,bilateral descended testes, no masses  Extremities: No peripheral edema  Vascular: 2+ peripheral pulses  Neurological: A/O x 3, no focal deficits  Psychiatric: Normal mood, normal affect  Musculoskeletal: 5/5 strength b/l upper and lower extremities  Skin: No rashes    LABS:                        13.5   10.85 )-----------( 182      ( 30 Jun 2020 07:40 )             38.3     06-30    138  |  107  |  13  ----------------------------<  133<H>  3.7   |  26  |  0.85    Ca    8.3<L>      30 Jun 2020 07:40  Phos  3.2     06-30  Mg     2.1     06-29    TPro  6.4  /  Alb  3.1<L>  /  TBili  1.1  /  DBili  x   /  AST  26  /  ALT  18  /  AlkPhos  70  06-29        Urine Culture:     RADIOLOGY & ADDITIONAL STUDIES:  EXAM:  CT ABDOMEN AND PELVIS IC                                  PROCEDURE DATE:  06/26/2020          INTERPRETATION:  CLINICAL INFORMATION: Abdominal pain with nausea vomiting and diarrhea.    COMPARISON: None.    PROCEDURE:   CT of the Abdomen and Pelvis was performed with intravenous contrast.   Intravenous contrast: 90 ml Omnipaque 350. 10 ml discarded.  Oral contrast: None.  Sagittal and coronal reformats were performed.    FINDINGS:    LOWER CHEST: Scarring/fibrosis left lower lobe.    LIVER: Within normal limits.  BILE DUCTS: Normal caliber.  GALLBLADDER: Distended. No wall thickening.  SPLEEN: Within normal limits.  PANCREAS: Atrophic.  ADRENALS: Within normal limits.  KIDNEYS/URETERS:   Tiny indeterminate hypodense lesion upper pole right kidney.  No hydronephrosis.    Evaluation of the pelvis is limited secondary to metallic streak artifact related to patient's bilateral hip arthroplasties.  BLADDER: Limited.  REPRODUCTIVE ORGANS: Limited.    BOWEL: Small hiatal hernia with mildly distended fluid-filled esophagus.  There is a fluid-filled distended stomach.  There are dilated fluid-filled loops of small bowel with transition in the right lower quadrant and collapsed distal ileum; compatible with small bowel obstruction.  There is prominent sigmoid diverticulosis, without CT evidence of diverticulitis.  There is a normal-appearing appendix.  PERITONEUM: There is mild interloop mesenteric edema/stranding. No free intraperitoneal air is noted.    VESSELS: Within normal limits.  RETROPERITONEUM/LYMPH NODES: Atherosclerotic calcification.    ABDOMINAL WALL: Within normal limits.    BONES: Multilevel degenerative changes of the spine with anterior spondylolisthesis L4 on L5.  Partially imaged are total bilateral hip arthroplasties.    IMPRESSION:     Distal small bowel obstruction.  Mild interloop edema/fluid.    Other findings as discussed above.                    AMARI CABALLERO M.D., ATTENDING RADIOLOGIST  This document has been electronically signed. Jun 26 2020  4:22PM
[facility]  Ernesto Contreras PA-C  [facility] [place] [address]  [phone]      Chief Complaint:  Patient is a 86y old  Male who presents with a chief complaint of SBO (30 Jun 2020 14:08)      HPI: 87 yo M PMHx of CAD s/p MI and stents to RCA and LAD, COPD (on bipap at night), HTN, HLD, BPH presents to the ED with 2 days of abdominal pain and N/V. Admit for SBO    Allergies:  penicillin (Hives)      Medications:  aluminum hydroxide/magnesium hydroxide/simethicone Suspension 30 milliLiter(s) Oral every 4 hours PRN  amLODIPine   Tablet 10 milliGRAM(s) Oral daily  atorvastatin 10 milliGRAM(s) Oral at bedtime  famotidine Injectable 20 milliGRAM(s) IV Push every 12 hours  finasteride 5 milliGRAM(s) Oral daily  heparin   Injectable 5000 Unit(s) SubCutaneous once  lactated ringers. 1000 milliLiter(s) IV Continuous <Continuous>  losartan 100 milliGRAM(s) Oral daily  metoprolol tartrate 25 milliGRAM(s) Oral two times a day  ondansetron Injectable 4 milliGRAM(s) IV Push every 6 hours PRN  ondansetron Injectable 4 milliGRAM(s) IV Push once  oxybutynin 5 milliGRAM(s) Oral two times a day  tamsulosin 0.4 milliGRAM(s) Oral at bedtime      PMHX/PSHX:  Pulmonary hypertension  COPD (chronic obstructive pulmonary disease)  Anxiety  CAD (coronary artery disease)  BPH (Benign Prostatic Hyperplasia)  Hyperlipemia  Hypertension  Detached retina, right  Bilateral cataracts  H/O bilateral hip replacements      Family history:  Family history of heart disease  Family history of hypertension in mother      Social History:     ROS:     General:  No wt loss, fevers, chills, night sweats, fatigue,   Eyes:  Good vision, no reported pain  ENT:  No sore throat, pain, runny nose, dysphagia  CV:  No pain, palpitations, hypo/hypertension  Resp:  No dyspnea, cough, tachypnea, wheezing  GI:  No pain, No nausea, No vomiting, No diarrhea, No constipation, No weight loss, No fever, No pruritis, No rectal bleeding, No tarry stools, No dysphagia,  :  No pain, bleeding, incontinence, nocturia  Muscle:  No pain, weakness  Neuro:  No weakness, tingling, memory problems  Psych:  No fatigue, insomnia, mood problems, depression  Endocrine:  No polyuria, polydipsia, cold/heat intolerance  Heme:  No petechiae, ecchymosis, easy bruisability  Skin:  No rash, tattoos, scars, edema      PHYSICAL EXAM:   Vital Signs:  Vital Signs Last 24 Hrs  T(C): 37.1 (30 Jun 2020 13:47), Max: 37.1 (30 Jun 2020 13:47)  T(F): 98.7 (30 Jun 2020 13:47), Max: 98.7 (30 Jun 2020 13:47)  HR: 68 (30 Jun 2020 13:47) (64 - 79)  BP: 150/78 (30 Jun 2020 13:47) (135/69 - 160/71)  BP(mean): --  RR: 20 (30 Jun 2020 13:47) (18 - 20)  SpO2: 91% (30 Jun 2020 13:47) (91% - 93%)  Daily     Daily     GENERAL:  Appears stated age, well-groomed, well-nourished, no distress  HEENT:  NC/AT,  conjunctivae clear and pink, no thyromegaly, nodules, adenopathy, no JVD, sclera -anicteric  CHEST:  Full & symmetric excursion, no increased effort, breath sounds clear  HEART:  Regular rhythm, S1, S2, no murmur/rub/S3/S4, no abdominal bruit, no edema  ABDOMEN:  Soft, non-tender, non-distended, normoactive bowel sounds,  no masses ,no hepato-splenomegaly, no signs of chronic liver disease  EXTEREMITIES:  no cyanosis,clubbing or edema  SKIN:  No rash/erythema/ecchymoses/petechiae/wounds/abscess/warm/dry  NEURO:  Alert, oriented, no asterixis, no tremor, no encephalopathy    LABS:                        13.5   10.85 )-----------( 182      ( 30 Jun 2020 07:40 )             38.3     06-30    138  |  107  |  13  ----------------------------<  133<H>  3.7   |  26  |  0.85    Ca    8.3<L>      30 Jun 2020 07:40  Phos  3.2     06-30  Mg     2.1     06-29    TPro  6.4  /  Alb  3.1<L>  /  TBili  1.1  /  DBili  x   /  AST  26  /  ALT  18  /  AlkPhos  70  06-29    LIVER FUNCTIONS - ( 29 Jun 2020 08:24 )  Alb: 3.1 g/dL / Pro: 6.4 g/dL / ALK PHOS: 70 U/L / ALT: 18 U/L / AST: 26 U/L / GGT: x                   Imaging:
· Subjective and Objective:   [facility] CARDIOLOGY CONSULTANTS:    Makayla Malik, Tonia, Denise, Tejas Comer Savella, Goodger      [phone]    87 yo M PMHx of CAD s/p MI and stents to RCA and LAD , COPD (on bipap at night), HTN, HLD, BPH presents to the ED with 2 days of abdominal pain and N/V. Patient states that for the past 2 days he has been having epigastric burning with some lower abdominal pain. Has not had anything PO in the past two days due to nausea and vomiting. States that had 6-7 episodes of emesis today. States that it is brown in color. Also admits to some loose stools. States that his symptoms have improved following the pepcid and zofran. Admits to some chest discomfort and pain in his shoulders b/l. Admits to some mild shortness of breath, which is chronic. Denies fever, chills, headache, dizziness, cough.  Had a nuclear stress on   which showed small, mod fixed defect apex EF 68% which was unchanged from his previous test 2019.   TTE 2020 EF 72%, mild LVH, mild AI/PI/TR, sev LAE, PASP 38-43mmHg    IN THE ED:  Temp 98.7  F , HR 70 , BP  153/80  ,RR 18 , SpO2 98% on RA  S/P pepcid and zofran, 1L IVF  EKG: NSR, LAFB  Labs significant for WBC 13.81, BUN 29, Glucose 145, bilirubin 1.4, toponin .000  CT A/P: IMPRESSION: Distal small bowel obstruction. Mild interloop edema/fluid.    Surgical team saw pt in the ED, did not recommend NG tube or any other intervention for now.     ROS otherwise negative unless noted        PAST MEDICAL & SURGICAL HISTORY:  Pulmonary hypertension: moderate  COPD (chronic obstructive pulmonary disease): mild (no home oxygen)  Anxiety  CAD (coronary artery disease)  BPH (Benign Prostatic Hyperplasia)  Hyperlipemia  Hypertension  Detached retina, right: with repair  Bilateral cataracts: removed  H/O bilateral hip replacements      MEDICATIONS  (STANDING):  amLODIPine   Tablet 10 milliGRAM(s) Oral daily  aspirin 325 milliGRAM(s) Oral at bedtime  atorvastatin 10 milliGRAM(s) Oral at bedtime  enoxaparin Injectable 40 milliGRAM(s) SubCutaneous daily  famotidine Injectable 20 milliGRAM(s) IV Push every 12 hours  finasteride 5 milliGRAM(s) Oral daily  lactated ringers. 1000 milliLiter(s) (75 mL/Hr) IV Continuous <Continuous>  losartan 100 milliGRAM(s) Oral daily  metoprolol tartrate 25 milliGRAM(s) Oral two times a day  tamsulosin 0.4 milliGRAM(s) Oral at bedtime      Allergies    penicillin (Hives)    Intolerances                              13.6   9.59  )-----------( 186      ( 2020 07:29 )             40.1       06-27    140  |  109<H>  |  23  ----------------------------<  81  3.6   |  26  |  0.89    Ca    8.0<L>      2020 07:29    TPro  6.2  /  Alb  3.1<L>  /  TBili  1.3<H>  /  DBili  x   /  AST  21  /  ALT  16  /  AlkPhos  66  0627      LIVER FUNCTIONS - ( 2020 07:29 )  Alb: 3.1 g/dL / Pro: 6.2 g/dL / ALK PHOS: 66 U/L / ALT: 16 U/L / AST: 21 U/L / GGT: x             PT/INR - ( 2020 17:34 )   PT: 13.5 sec;   INR: 1.21 ratio         PTT - ( 2020 17:34 )  PTT:27.0 sec    CARDIAC MARKERS ( 2020 00:08 )  <.015 ng/mL / x     / x     / x     / x      CARDIAC MARKERS ( 2020 14:33 )  .000 ng/mL / x     / x     / x     / x                        Daily Height in cm: 180.34 (2020 18:35)    Daily Weight in k.4 (2020 00:03)    I&O's Summary    2020 07:01  -  2020 07:00  --------------------------------------------------------  IN: 400 mL / OUT: 0 mL / NET: 400 mL        Vital Signs Last 24 Hrs  T(C): 36.6 (2020 12:23), Max: 37.1 (2020 18:35)  T(F): 97.9 (2020 12:23), Max: 98.7 (2020 18:35)  HR: 64 (2020 12:23) (61 - 75)  BP: 111/61 (2020 12:23) (111/61 - 170/71)  BP(mean): --  RR: 20 (2020 12:23) (17 - 20)  SpO2: 64% (2020 12:23) (64% - 98%)    PHYSICAL EXAM:   · Constitutional	Well-developed, well nourished  · Eyes	EOMI; PERRL; no drainage or redness  · ENMT	No oral lesions; no gross abnormalities  · Neck	No bruits; no thyromegaly or nodules  · Respiratory	Normal breath sounds b/l, No RRW  · Cardiovascular	Regular rate & rhythm, normal S1, S2; no murmurs, gallops or rubs; no S3, S4  · Gastrointestinal	Soft, non-tender, no hepatosplenomegaly, normal bowel sounds  · Extremities	No cyanosis, clubbing or edema  · Vascular	Equal and normal pulses (carotid, femoral, dorsalis pedis)  · Neurological	Alert & oriented; no sensory, motor or coordination deficits, normal reflexes
Date/Time Patient Seen:  		  Referring MD:   Data Reviewed	       Patient is a 87y old  Male who presents with a chief complaint of SBO (03 Jul 2020 09:29)      Subjective/HPI  in bed  seen and examined  vs and meds reviewed  labs reviewed  H and P reviewed  er provider note reviewed      85 yo M PMHx of CAD s/p MI and stents to RCA and LAD, COPD (on bipap at night), HTN, HLD, BPH presents to the ED with 2 days of abdominal pain and N/V. Patient states that for the past 2 days he has been having epigastric burning with some lower abdominal pain. Has not had anything PO in the past two days due to nausea and vomiting. States that had 6-7 episodes of emesis today. States that it is brown in color. Also admits to some loose stools since yesterday. States that his symptoms have improved following the pepcid and zofran. Admits to some chest discomfort and pain in his shoulders b/l. Admits to some mild shortness of breath, which is chronic. Had a nuclear stress on 6/24 which was unchanged from his previous test. Denies fever, chills, headache, dizziness, cough.     FAMILY HISTORY:  Family history of heart disease  Family history of hypertension in mother.     Social History:  Social History (marital status, living situation, occupation, tobacco use, alcohol and drug use, and sexual history): Tobacco: quit 35 yrs ago, former 2 pack a day smoker for 20 years  	EtOH: denies  	Recreational drug use: denies  	Lives with: wife  	Ambulates: independent  	ADLs: independent  Last colonoscopy: 2 years ago - normal     Tobacco Screening:  · Core Measure Site	Yes  · Has the patient used tobacco in the past 30 days?	No    Risk Assessment:    Present on Admission:  Deep Venous Thrombosis	no  Pulmonary Embolus	no     Heart Failure:  Does this patient have a history of or has been diagnosed with heart failure? no.  PAST MEDICAL & SURGICAL HISTORY:  Pulmonary hypertension: moderate  COPD (chronic obstructive pulmonary disease): mild (no home oxygen)  Anxiety  CAD (coronary artery disease)  BPH (Benign Prostatic Hyperplasia)  Hyperlipemia  Hypertension  Detached retina, right: with repair  Bilateral cataracts: removed  H/O bilateral hip replacements        Medication list         MEDICATIONS  (STANDING):  amLODIPine   Tablet 10 milliGRAM(s) Oral daily  atorvastatin 10 milliGRAM(s) Oral at bedtime  budesonide  80 MICROgram(s)/formoterol 4.5 MICROgram(s) Inhaler 2 Puff(s) Inhalation two times a day  famotidine Injectable 20 milliGRAM(s) IV Push every 12 hours  finasteride 5 milliGRAM(s) Oral daily  guaiFENesin  milliGRAM(s) Oral every 12 hours  losartan 100 milliGRAM(s) Oral daily  metoprolol tartrate 25 milliGRAM(s) Oral two times a day  tamsulosin 0.4 milliGRAM(s) Oral at bedtime    MEDICATIONS  (PRN):  ALBUTerol    90 MICROgram(s) HFA Inhaler 2 Puff(s) Inhalation every 4 hours PRN Shortness of Breath and/or Wheezing  aluminum hydroxide/magnesium hydroxide/simethicone Suspension 30 milliLiter(s) Oral every 4 hours PRN Dyspepsia  ondansetron Injectable 4 milliGRAM(s) IV Push every 6 hours PRN Nausea         Vitals log        ICU Vital Signs Last 24 Hrs  T(C): 36.9 (03 Jul 2020 05:01), Max: 37.1 (02 Jul 2020 21:55)  T(F): 98.4 (03 Jul 2020 05:01), Max: 98.7 (02 Jul 2020 21:55)  HR: 65 (03 Jul 2020 05:01) (60 - 65)  BP: 135/72 (03 Jul 2020 05:01) (125/71 - 146/76)  BP(mean): --  ABP: --  ABP(mean): --  RR: 20 (03 Jul 2020 05:01) (20 - 20)  SpO2: 94% (03 Jul 2020 05:01) (94% - 94%)           Input and Output:  I&O's Detail    02 Jul 2020 07:01  -  03 Jul 2020 07:00  --------------------------------------------------------  IN:    lactated ringers.: 900 mL  Total IN: 900 mL    OUT:    Voided: 2400 mL  Total OUT: 2400 mL    Total NET: -1500 mL          Lab Data                        14.0   8.34  )-----------( 220      ( 03 Jul 2020 06:47 )             38.9     07-03    142  |  107  |  10  ----------------------------<  98  3.3<L>   |  29  |  0.82    Ca    8.2<L>      03 Jul 2020 06:47  Phos  3.2     07-03  Mg     2.1     07-03              Review of Systems	      Objective     Physical Examination        Pertinent Lab findings & Imaging      Getachew:  NO   Adequate UO     I&O's Detail    02 Jul 2020 07:01  -  03 Jul 2020 07:00  --------------------------------------------------------  IN:    lactated ringers.: 900 mL  Total IN: 900 mL    OUT:    Voided: 2400 mL  Total OUT: 2400 mL    Total NET: -1500 mL               Discussed with:     Cultures:	        Radiology

## 2020-07-03 NOTE — PROGRESS NOTE ADULT - SUBJECTIVE AND OBJECTIVE BOX
STATUS POST:  diagnostic lap    POST OPERATIVE DAY #: 2    SUBJECTIVE:  Patient seen and examined at bedside.  No overnight events.  Patient with no new complaints at this time, currently tolerating CLD, admits to flatus and denies bowel movement.  Patient denies any fevers, chills, chest pain, shortness of breath, abdominal pain, nausea, vomiting or diarrhea.    Vital Signs Last 24 Hrs  T(C): 36.9 (03 Jul 2020 05:01), Max: 37.1 (02 Jul 2020 21:55)  T(F): 98.4 (03 Jul 2020 05:01), Max: 98.7 (02 Jul 2020 21:55)  HR: 65 (03 Jul 2020 05:01) (60 - 65)  BP: 135/72 (03 Jul 2020 05:01) (125/71 - 146/76)  BP(mean): --  RR: 20 (03 Jul 2020 05:01) (20 - 20)  SpO2: 94% (03 Jul 2020 05:01) (94% - 94%)    PHYSICAL EXAM:  GENERAL: No acute distress, well-developed  HEAD:  Atraumatic, Normocephalic  ABDOMEN: Soft, non-tender, distended; bowel sounds+  NEUROLOGY: A&O x 3, no focal deficits    I&O's Summary    02 Jul 2020 07:01  -  03 Jul 2020 07:00  --------------------------------------------------------  IN: 900 mL / OUT: 2400 mL / NET: -1500 mL      I&O's Detail    02 Jul 2020 07:01  -  03 Jul 2020 07:00  --------------------------------------------------------  IN:    lactated ringers.: 900 mL  Total IN: 900 mL    OUT:    Voided: 2400 mL  Total OUT: 2400 mL    Total NET: -1500 mL        MEDICATIONS  (STANDING):  amLODIPine   Tablet 10 milliGRAM(s) Oral daily  atorvastatin 10 milliGRAM(s) Oral at bedtime  famotidine Injectable 20 milliGRAM(s) IV Push every 12 hours  finasteride 5 milliGRAM(s) Oral daily  losartan 100 milliGRAM(s) Oral daily  metoprolol tartrate 25 milliGRAM(s) Oral two times a day  tamsulosin 0.4 milliGRAM(s) Oral at bedtime    MEDICATIONS  (PRN):  aluminum hydroxide/magnesium hydroxide/simethicone Suspension 30 milliLiter(s) Oral every 4 hours PRN Dyspepsia  ondansetron Injectable 4 milliGRAM(s) IV Push every 6 hours PRN Nausea    LABS:                        14.0   8.34  )-----------( 220      ( 03 Jul 2020 06:47 )             38.9     07-03    142  |  107  |  10  ----------------------------<  98  3.3<L>   |  29  |  0.82    Ca    8.2<L>      03 Jul 2020 06:47  Phos  3.2     07-03  Mg     2.1     07-03    RADIOLOGY & ADDITIONAL STUDIES:  no new    ASSESSMENT    87y Male POD 1 s/p diagnostic lap revealing ileus currently still distended    PLAN  - Discussed with Dr. Simpson  - adv to FLD, advance diet as tolerated  - recommending Reglan as a promotility agent if comorbidities permit  - At this time there is no indication for surgical intervention, signing off, please reconsult as needed    Surgical Team Contact Information  Spectralink: Ext: [phone] or [phone]  Pager: [phone]

## 2020-07-03 NOTE — CONSULT NOTE ADULT - PROBLEM SELECTOR RECOMMENDATION 9
sbo - post op - Lap Ex - Surgery follow up noted - on Full Liquid diet - CT scan noted  ambulate - out of bed - I miles - post op care and management  COPD - proventil and symbicort - mucienx - I miles - post op  follows with Dr. Car in the office - ex smoker -   JC - uses NIPPV at home - not using device in the hospital - does not want to use BIPAP at present - sleep hygiene discussed - education and counseling  BPH - flomax - monitor sx  will check ProBNP and VBG and TSH  will follow  tolerating room air at rest  ct scan and labs reviewed with the patient  small sub cm pulm nodules - will follow up as outpatient with his PMD pulm - Dr. Car

## 2020-07-03 NOTE — PROGRESS NOTE ADULT - ASSESSMENT
85 yo M PMHx of CAD s/p MI and stents to RCA and LAD 1997, COPD (on bipap at night), HTN, HLD, BPH presents to the ED with 2 days of abdominal pain and N/V. Found to have SBO    CAD with normal LV function  - Had a nuclear stress on 6/24  which showed small, mod fixed defect apex EF 68% which was unchanged from his previous test 06/2019.   - TTE 01/2020 EF 72%, mild LVH, mild AI/PI/TR, sev LAE, PASP 38-43mmHg  - No s/s acute ischemia or HF at this time  - Continue ASA  - Continue statin  - Continue metoprolol, losartan, Norvasc  -On tele w/o arrythmia overnight will D/C tele    HTN,   - mild LVH on TTE  - well controlled   - Continue BB, ARB, CCB    Hyperlipidemia  - Continue statin     SBO  - S/P LAPAROSCOPY w/o cardiac complications  - BP well controlled, monitor routine hemodynamic   - per primary /surgical team    COPD  - Cont inhalers as per Pulm  - Encourage C&DB and I/S    - Monitor and replete lytes, keep K>4, Mg>2.  - All other medical needs as per primary team.  - Other cardiovascular workup will depend on clinical course.  - Will continue to follow.    RUBY Davison-BC  Cardiology   Spectra #[phone]

## 2020-07-04 LAB
ANION GAP SERPL CALC-SCNC: 6 MMOL/L — SIGNIFICANT CHANGE UP (ref 5–17)
BUN SERPL-MCNC: 8 MG/DL — SIGNIFICANT CHANGE UP (ref 7–23)
CALCIUM SERPL-MCNC: 8.6 MG/DL — SIGNIFICANT CHANGE UP (ref 8.5–10.1)
CHLORIDE SERPL-SCNC: 104 MMOL/L — SIGNIFICANT CHANGE UP (ref 96–108)
CO2 SERPL-SCNC: 28 MMOL/L — SIGNIFICANT CHANGE UP (ref 22–31)
CREAT SERPL-MCNC: 0.73 MG/DL — SIGNIFICANT CHANGE UP (ref 0.5–1.3)
GLUCOSE SERPL-MCNC: 105 MG/DL — HIGH (ref 70–99)
MAGNESIUM SERPL-MCNC: 2.1 MG/DL — SIGNIFICANT CHANGE UP (ref 1.6–2.6)
POTASSIUM SERPL-MCNC: 3.7 MMOL/L — SIGNIFICANT CHANGE UP (ref 3.5–5.3)
POTASSIUM SERPL-SCNC: 3.7 MMOL/L — SIGNIFICANT CHANGE UP (ref 3.5–5.3)
SODIUM SERPL-SCNC: 138 MMOL/L — SIGNIFICANT CHANGE UP (ref 135–145)

## 2020-07-04 PROCEDURE — 99233 SBSQ HOSP IP/OBS HIGH 50: CPT

## 2020-07-04 PROCEDURE — 99232 SBSQ HOSP IP/OBS MODERATE 35: CPT

## 2020-07-04 PROCEDURE — 99024 POSTOP FOLLOW-UP VISIT: CPT

## 2020-07-04 RX ORDER — ASPIRIN/CALCIUM CARB/MAGNESIUM 324 MG
325 TABLET ORAL DAILY
Refills: 0 | Status: DISCONTINUED | OUTPATIENT
Start: 2020-07-04 | End: 2020-07-06

## 2020-07-04 RX ORDER — OXYBUTYNIN CHLORIDE 5 MG
5 TABLET ORAL ONCE
Refills: 0 | Status: COMPLETED | OUTPATIENT
Start: 2020-07-04 | End: 2020-07-04

## 2020-07-04 RX ORDER — ENOXAPARIN SODIUM 100 MG/ML
40 INJECTION SUBCUTANEOUS DAILY
Refills: 0 | Status: DISCONTINUED | OUTPATIENT
Start: 2020-07-05 | End: 2020-07-06

## 2020-07-04 RX ADMIN — Medication 600 MILLIGRAM(S): at 18:21

## 2020-07-04 RX ADMIN — LOSARTAN POTASSIUM 100 MILLIGRAM(S): 100 TABLET, FILM COATED ORAL at 05:32

## 2020-07-04 RX ADMIN — Medication 5 MILLIGRAM(S): at 06:51

## 2020-07-04 RX ADMIN — Medication 25 MILLIGRAM(S): at 05:32

## 2020-07-04 RX ADMIN — AMLODIPINE BESYLATE 10 MILLIGRAM(S): 2.5 TABLET ORAL at 05:32

## 2020-07-04 RX ADMIN — Medication 25 MILLIGRAM(S): at 18:27

## 2020-07-04 RX ADMIN — FINASTERIDE 5 MILLIGRAM(S): 5 TABLET, FILM COATED ORAL at 11:20

## 2020-07-04 RX ADMIN — FAMOTIDINE 20 MILLIGRAM(S): 10 INJECTION INTRAVENOUS at 18:21

## 2020-07-04 RX ADMIN — BUDESONIDE AND FORMOTEROL FUMARATE DIHYDRATE 2 PUFF(S): 160; 4.5 AEROSOL RESPIRATORY (INHALATION) at 18:21

## 2020-07-04 RX ADMIN — BUDESONIDE AND FORMOTEROL FUMARATE DIHYDRATE 2 PUFF(S): 160; 4.5 AEROSOL RESPIRATORY (INHALATION) at 05:32

## 2020-07-04 RX ADMIN — TAMSULOSIN HYDROCHLORIDE 0.4 MILLIGRAM(S): 0.4 CAPSULE ORAL at 23:00

## 2020-07-04 RX ADMIN — ATORVASTATIN CALCIUM 10 MILLIGRAM(S): 80 TABLET, FILM COATED ORAL at 23:00

## 2020-07-04 RX ADMIN — FAMOTIDINE 20 MILLIGRAM(S): 10 INJECTION INTRAVENOUS at 05:32

## 2020-07-04 RX ADMIN — Medication 600 MILLIGRAM(S): at 05:32

## 2020-07-04 NOTE — PROGRESS NOTE ADULT - SUBJECTIVE AND OBJECTIVE BOX
INTERVAL HPI/OVERNIGHT EVENTS:  pt seen and examined      MEDICATIONS  (STANDING):  amLODIPine   Tablet 10 milliGRAM(s) Oral daily  atorvastatin 10 milliGRAM(s) Oral at bedtime  famotidine Injectable 20 milliGRAM(s) IV Push every 12 hours  finasteride 5 milliGRAM(s) Oral daily  lactated ringers. 1000 milliLiter(s) (75 mL/Hr) IV Continuous <Continuous>  losartan 100 milliGRAM(s) Oral daily  metoprolol tartrate 25 milliGRAM(s) Oral two times a day  tamsulosin 0.4 milliGRAM(s) Oral at bedtime    MEDICATIONS  (PRN):  aluminum hydroxide/magnesium hydroxide/simethicone Suspension 30 milliLiter(s) Oral every 4 hours PRN Dyspepsia  ondansetron Injectable 4 milliGRAM(s) IV Push every 6 hours PRN Nausea      Allergies    penicillin (Hives)    Intolerances        Review of Systems:    General:  No wt loss, fevers, chills, night sweats, fatigue   Eyes:  Good vision, no reported pain  ENT:  No sore throat, pain, runny nose, dysphagia  CV:  No pain, palpitations, hypo/hypertension  Resp:  No dyspnea, cough, tachypnea, wheezing  GI:  No pain, No nausea, No vomiting, No diarrhea, No constipation, No weight loss, No fever, No pruritis, No rectal bleeding, No melena, No dysphagia  :  No pain, bleeding, incontinence, nocturia  Muscle:  No pain, weakness  Neuro:  No weakness, tingling, memory problems  Psych:  No fatigue, insomnia, mood problems, depression  Endocrine:  No polyuria, polydypsia, cold/heat intolerance  Heme:  No petechiae, ecchymosis, easy bruisability  Skin:  No rash, tattoos, scars, edema      Vital Signs Last 24 Hrs  T(C): 36.9 (02 Jul 2020 04:30), Max: 37.3 (01 Jul 2020 10:36)  T(F): 98.4 (02 Jul 2020 04:30), Max: 99.1 (01 Jul 2020 10:36)  HR: 72 (02 Jul 2020 04:30) (58 - 85)  BP: 156/78 (02 Jul 2020 04:30) (119/73 - 170/84)  BP(mean): --  RR: 17 (02 Jul 2020 04:30) (11 - 19)  SpO2: 93% (02 Jul 2020 04:30) (90% - 100%)    PHYSICAL EXAM:    Constitutional: NAD  HEENT: ncat  Gastrointestinal: soft nt +dt  Extremities: No peripheral edema  Vascular: + peripheral pulses  Neurological: A/O x 3    LABS:                        13.7   8.32  )-----------( 219      ( 02 Jul 2020 06:56 )             39.1     07-02    140  |  107  |  13  ----------------------------<  120<H>  3.9   |  28  |  0.89    Ca    8.4<L>      02 Jul 2020 06:56  Phos  3.0     07-02  Mg     2.2     07-02            RADIOLOGY & ADDITIONAL TESTS:

## 2020-07-04 NOTE — PROGRESS NOTE ADULT - ASSESSMENT
87 yo M PMHx of CAD s/p MI and stents to RCA and LAD 1997, COPD (on bipap at night), HTN, HLD, BPH presents to the ED with 2 days of abdominal pain and N/V. Found to have SBO S/P ex Lap     CAD   - Continue ASA  - Continue statin  - Continue metoprolol, losartan, Norvasc    HTN,   - mild LVH on TTE  - well controlled   - Continue BB, ARB, CCB    Hyperlipidemia  - Continue statin     SBO  - S/P LAPAROSCOPY w/o cardiac complications  - BP well controlled, monitor routine hemodynamic   - per primary /surgical team    COPD  - Cont inhalers as per Pulm  - Encourage C&DB and I/S    - Monitor and replete lytes, keep K>4, Mg>2.  - All other medical needs as per primary team.  - Other cardiovascular workup will depend on clinical course.  - Will continue to follow.    Sofie Norris DNP, ANP-c  Cardiology   Spectra #[phone]  [phone]

## 2020-07-04 NOTE — PROGRESS NOTE ADULT - SUBJECTIVE AND OBJECTIVE BOX
Date/Time Patient Seen:  		  Referring MD:   Data Reviewed	       Patient is a 87y old  Male who presents with a chief complaint of SBO (03 Jul 2020 15:20)      Subjective/HPI     PAST MEDICAL & SURGICAL HISTORY:  Pulmonary hypertension: moderate  COPD (chronic obstructive pulmonary disease): mild (no home oxygen)  Anxiety  CAD (coronary artery disease)  BPH (Benign Prostatic Hyperplasia)  Hyperlipemia  Hypertension  Detached retina, right: with repair  Bilateral cataracts: removed  H/O bilateral hip replacements        Medication list         MEDICATIONS  (STANDING):  amLODIPine   Tablet 10 milliGRAM(s) Oral daily  atorvastatin 10 milliGRAM(s) Oral at bedtime  budesonide  80 MICROgram(s)/formoterol 4.5 MICROgram(s) Inhaler 2 Puff(s) Inhalation two times a day  famotidine Injectable 20 milliGRAM(s) IV Push every 12 hours  finasteride 5 milliGRAM(s) Oral daily  guaiFENesin  milliGRAM(s) Oral every 12 hours  losartan 100 milliGRAM(s) Oral daily  metoprolol tartrate 25 milliGRAM(s) Oral two times a day  oxybutynin 5 milliGRAM(s) Oral once  tamsulosin 0.4 milliGRAM(s) Oral at bedtime    MEDICATIONS  (PRN):  ALBUTerol    90 MICROgram(s) HFA Inhaler 2 Puff(s) Inhalation every 4 hours PRN Shortness of Breath and/or Wheezing  aluminum hydroxide/magnesium hydroxide/simethicone Suspension 30 milliLiter(s) Oral every 4 hours PRN Dyspepsia  ondansetron Injectable 4 milliGRAM(s) IV Push every 6 hours PRN Nausea         Vitals log        ICU Vital Signs Last 24 Hrs  T(C): 37 (04 Jul 2020 04:51), Max: 37 (04 Jul 2020 04:51)  T(F): 98.6 (04 Jul 2020 04:51), Max: 98.6 (04 Jul 2020 04:51)  HR: 66 (04 Jul 2020 04:51) (66 - 68)  BP: 148/53 (04 Jul 2020 04:51) (116/67 - 148/67)  BP(mean): --  ABP: --  ABP(mean): --  RR: 18 (04 Jul 2020 04:51) (17 - 19)  SpO2: 95% (04 Jul 2020 04:51) (93% - 95%)           Input and Output:  I&O's Detail    02 Jul 2020 07:01  -  03 Jul 2020 07:00  --------------------------------------------------------  IN:    lactated ringers.: 900 mL  Total IN: 900 mL    OUT:    Voided: 2400 mL  Total OUT: 2400 mL    Total NET: -1500 mL      03 Jul 2020 07:01  -  04 Jul 2020 06:26  --------------------------------------------------------  IN:    Oral Fluid: 460 mL  Total IN: 460 mL    OUT:  Total OUT: 0 mL    Total NET: 460 mL          Lab Data                        14.0   8.34  )-----------( 220      ( 03 Jul 2020 06:47 )             38.9     07-03    142  |  107  |  10  ----------------------------<  98  3.3<L>   |  29  |  0.82    Ca    8.2<L>      03 Jul 2020 06:47  Phos  3.2     07-03  Mg     2.1     07-03              Review of Systems	      Objective     Physical Examination    heart s1s2  lung dec BS  on RA      Pertinent Lab findings & Imaging      Getachew:  NO   Adequate UO     I&O's Detail    02 Jul 2020 07:01  -  03 Jul 2020 07:00  --------------------------------------------------------  IN:    lactated ringers.: 900 mL  Total IN: 900 mL    OUT:    Voided: 2400 mL  Total OUT: 2400 mL    Total NET: -1500 mL      03 Jul 2020 07:01  -  04 Jul 2020 06:26  --------------------------------------------------------  IN:    Oral Fluid: 460 mL  Total IN: 460 mL    OUT:  Total OUT: 0 mL    Total NET: 460 mL               Discussed with:     Cultures:	        Radiology

## 2020-07-04 NOTE — PROGRESS NOTE ADULT - ASSESSMENT
86M, HTN, HL, CAD/stents, COPD/nocturnal BiPAP, BPH; adm n/v/abd pain - CT c/f SBO - failed conservative mgmt w/repeat CTAP showing continued SBO - s/p OR 7/1 for exlap w/neg findings - c/f ileus    Ileus - failed conservative mgmt for SBO  -no e/o colonic obstructive process - s/p exlap 7/1 w/neg findings for SBO   -postop - started passing flatus, diet advanced to low fiber diet - monitoring tolerance  -prn analgesia  -prn antiemetics    volume depletion - gentle IVFs    HTN, HL, CAD - continued lipitor, metoprolol, norvasc, losartan; holding asa perioperatively    COPD - stable; no e/o decompensation - continue nocturnal BiPAP; Pulm consulted to optimize med regimen    BPH - continued regimen of flomax, finasteride    dvt prophy

## 2020-07-04 NOTE — PROGRESS NOTE ADULT - SUBJECTIVE AND OBJECTIVE BOX
Patient is a 87y old  Male who presents with a chief complaint of SBO (04 Jul 2020 11:47)      INTERVAL HPI/OVERNIGHT EVENTS: 86M, HTN, HL, CAD/stents, COPD/nocturnal BiPAP, BPH; adm n/v/abd pain - CT c/f SBO - failed conservative mgmt w/repeat CTAP showing continued SBO - s/p OR 7/1 for exlap w/neg findings - c/f ileus. pt had BM and bowel sound    MEDICATIONS  (STANDING):  amLODIPine   Tablet 10 milliGRAM(s) Oral daily  atorvastatin 10 milliGRAM(s) Oral at bedtime  budesonide  80 MICROgram(s)/formoterol 4.5 MICROgram(s) Inhaler 2 Puff(s) Inhalation two times a day  famotidine Injectable 20 milliGRAM(s) IV Push every 12 hours  finasteride 5 milliGRAM(s) Oral daily  guaiFENesin  milliGRAM(s) Oral every 12 hours  losartan 100 milliGRAM(s) Oral daily  metoprolol tartrate 25 milliGRAM(s) Oral two times a day  tamsulosin 0.4 milliGRAM(s) Oral at bedtime    MEDICATIONS  (PRN):  ALBUTerol    90 MICROgram(s) HFA Inhaler 2 Puff(s) Inhalation every 4 hours PRN Shortness of Breath and/or Wheezing  aluminum hydroxide/magnesium hydroxide/simethicone Suspension 30 milliLiter(s) Oral every 4 hours PRN Dyspepsia  ondansetron Injectable 4 milliGRAM(s) IV Push every 6 hours PRN Nausea      Allergies    penicillin (Hives)    Intolerances        REVIEW OF SYSTEMS:  CONSTITUTIONAL: No fever, weight loss, or fatigue  EYES: No eye pain, visual disturbances, or discharge  ENMT:  No difficulty hearing, tinnitus, vertigo; No sinus or throat pain  NECK: No pain or stiffness  BREASTS: No pain, masses, or nipple discharge  RESPIRATORY: No cough, wheezing, chills or hemoptysis; No shortness of breath  CARDIOVASCULAR: No chest pain, palpitations, dizziness, or leg swelling  GASTROINTESTINAL: No abdominal or epigastric pain. No nausea, vomiting, or hematemesis; No diarrhea or constipation. No melena or hematochezia.  GENITOURINARY: No dysuria, frequency, hematuria, or incontinence  NEUROLOGICAL: No headaches, memory loss, loss of strength, numbness, or tremors  SKIN: No itching, burning, rashes, or lesions   LYMPH NODES: No enlarged glands  ENDOCRINE: No heat or cold intolerance; No hair loss; No polydipsia or polyuria  MUSCULOSKELETAL: No joint pain or swelling; No muscle, back, or extremity pain  PSYCHIATRIC: No depression, anxiety, mood swings, or difficulty sleeping  HEME/LYMPH: No easy bruising, or bleeding gums  ALLERGY AND IMMUNOLOGIC: No hives or eczema    Vital Signs Last 24 Hrs  T(C): 37 (04 Jul 2020 04:51), Max: 37 (04 Jul 2020 04:51)  T(F): 98.6 (04 Jul 2020 04:51), Max: 98.6 (04 Jul 2020 04:51)  HR: 66 (04 Jul 2020 04:51) (66 - 68)  BP: 148/53 (04 Jul 2020 04:51) (116/67 - 148/67)  BP(mean): --  RR: 18 (04 Jul 2020 04:51) (17 - 19)  SpO2: 95% (04 Jul 2020 04:51) (93% - 95%)    PHYSICAL EXAM:  GENERAL: NAD, well-groomed, well-developed  HEAD:  Atraumatic, Normocephalic  EYES: EOMI, PERRLA, conjunctiva and sclera clear  ENMT: No tonsillar erythema, exudates, or enlargement; Moist mucous membranes, Good dentition, No lesions  NECK: Supple, No JVD, Normal thyroid  NERVOUS SYSTEM:  Alert & Oriented X3, Good concentration; Motor Strength 5/5 B/L upper and lower extremities; DTRs 2+ intact and symmetric  CHEST/LUNG: Clear to auscultation bilaterally; No rales, rhonchi, wheezing, or rubs  HEART: Regular rate and rhythm; No murmurs, rubs, or gallops  ABDOMEN: Soft, Nontender, Nondistended; Bowel sounds present  EXTREMITIES:  2+ Peripheral Pulses, No clubbing, cyanosis, or edema  LYMPH: No lymphadenopathy noted  SKIN: No rashes or lesions    LABS:    04 Jul 2020 08:22    138    |  104    |  8      ----------------------------<  105    3.7     |  28     |  0.73     Ca    8.6        04 Jul 2020 08:22  Mg     2.1       04 Jul 2020 08:22          CAPILLARY BLOOD GLUCOSE          RADIOLOGY & ADDITIONAL TESTS:    Imaging Personally Reviewed:  [x ] YES  [ ] NO    Consultant(s) Notes Reviewed:  [x ] YES  [ ] NO    Care Discussed with Consultants/Other Providers [ x] YES  [ ] NO

## 2020-07-04 NOTE — PROGRESS NOTE ADULT - SUBJECTIVE AND OBJECTIVE BOX
NP Progress Note     [facility] -- Makayla Malik, Tonia, Denise, Tejas Comer Savella  Office # [phone]      Follow Up: CAD     HPI:  87 yo M PMHx of CAD s/p MI and stents to RCA and LAD, COPD (on bipap at night), HTN, HLD, BPH presents to the ED with 2 days of abdominal pain and N/V. Patient states that for the past 2 days he has been having epigastric burning with some lower abdominal pain. Has not had anything PO in the past two days due to nausea and vomiting. States that had 6-7 episodes of emesis today. States that it is brown in color. Also admits to some loose stools since yesterday. States that his symptoms have improved following the pepcid and zofran. Admits to some chest discomfort and pain in his shoulders b/l. Admits to some mild shortness of breath, which is chronic. Had a nuclear stress on 6/24 which was unchanged from his previous test. Denies fever, chills, headache, dizziness, cough.    IN THE ED:  Temp 98.7  F , HR 70 , BP  153/80  ,RR 18 , SpO2 98% on RA  S/P pepcid and zofran, 1L IVF  EKG: NSR, LAFB  Labs significant for WBC 13.81, BUN 29, Glucose 145, bilirubin 1.4, troponin .000  CT A/P: IMPRESSION: Distal small bowel obstruction. Mild interloop edema/fluid.    Surgical team saw pt in the ED, did not recommend NG tube or any other intervention for now. (26 Jun 2020 21:48)        Subjective/Observations: Pt. seen and examined and evaluated. Pt. resting comfortably in bed in NAD, with no respiratory distress, no chest pain, dyspnea, palpitations, PND, or orthopnea.      REVIEW OF SYSTEMS: All other review of systems is negative unless indicated above    PAST MEDICAL & SURGICAL HISTORY:  Pulmonary hypertension: moderate  COPD (chronic obstructive pulmonary disease): mild (no home oxygen)  Anxiety  CAD (coronary artery disease)  BPH (Benign Prostatic Hyperplasia)  Hyperlipemia  Hypertension  Detached retina, right: with repair  Bilateral cataracts: removed  H/O bilateral hip replacements      MEDICATIONS  (STANDING):  amLODIPine   Tablet 10 milliGRAM(s) Oral daily  atorvastatin 10 milliGRAM(s) Oral at bedtime  budesonide  80 MICROgram(s)/formoterol 4.5 MICROgram(s) Inhaler 2 Puff(s) Inhalation two times a day  famotidine Injectable 20 milliGRAM(s) IV Push every 12 hours  finasteride 5 milliGRAM(s) Oral daily  guaiFENesin  milliGRAM(s) Oral every 12 hours  losartan 100 milliGRAM(s) Oral daily  metoprolol tartrate 25 milliGRAM(s) Oral two times a day  tamsulosin 0.4 milliGRAM(s) Oral at bedtime    MEDICATIONS  (PRN):  ALBUTerol    90 MICROgram(s) HFA Inhaler 2 Puff(s) Inhalation every 4 hours PRN Shortness of Breath and/or Wheezing  aluminum hydroxide/magnesium hydroxide/simethicone Suspension 30 milliLiter(s) Oral every 4 hours PRN Dyspepsia  ondansetron Injectable 4 milliGRAM(s) IV Push every 6 hours PRN Nausea      Allergies:  penicillin (Hives)    Intolerances    Vital Signs Last 24 Hrs  T(C): 37 (04 Jul 2020 04:51), Max: 37 (04 Jul 2020 04:51)  T(F): 98.6 (04 Jul 2020 04:51), Max: 98.6 (04 Jul 2020 04:51)  HR: 66 (04 Jul 2020 04:51) (66 - 68)  BP: 148/53 (04 Jul 2020 04:51) (116/67 - 148/67)  BP(mean): --  RR: 18 (04 Jul 2020 04:51) (17 - 19)  SpO2: 95% (04 Jul 2020 04:51) (93% - 95%)    I&O's Summary    03 Jul 2020 07:01  -  04 Jul 2020 07:00  --------------------------------------------------------  IN: 460 mL / OUT: 0 mL / NET: 460 mL       LABS: All Labs Reviewed:                04 Jul 2020 08:22    138    |  104    |  8      ----------------------------<  105    3.7     |  28     |  0.73     Ca    8.6        04 Jul 2020 08:22  Mg     2.1       04 Jul 2020 08:22    Interpretation of Telemetry: Pt. is not on telemetry      Physical Exam:  Appearance: [ ] Normal  [ ] abnormal [X ] NAD   Eyes: [ ] PERRL [ ] EOMI  HEENT: [ ] Normal [ ] Abnormal oral mucosa [ ]NC/AT  Cardiovascular: [X ] S1 [X ] S2 [ ] RRR [ ] m/r/g [ ]edema [ ] JVP  Procedural Access Site: [ ]  hematoma [ ] tender to palpation [ ] 2+ pulse [ ] bruit [ ] Ecchymosis  Respiratory: [X ] coarse breath sounds   Gastrointestinal: [ ] Soft [ ] tenderness[ ] distension [ ] BS  Musculoskeletal: [ ] clubbing [ ] joint deformity   Neurologic: [ ] Non-focal  Lymphatic: [ ] lymphadenopathy  Psychiatry: [X ] AAOx3  [ ] confused [ ] disoriented [ ] Mood & affect appropriate  Skin: [ ]  rashes [ ] ecchymoses [ ] cyanosis

## 2020-07-04 NOTE — PROGRESS NOTE ADULT - PROBLEM SELECTOR PLAN 1
sbo - post op - Lap Ex - Surgery follow up noted - on Full Liquid diet - CT scan noted  ambulate - out of bed - I miles - post op care and management  COPD - proventil and symbicort - mucienx - I miles - post op  follows with Dr. Car in the office - ex smoker -   JC - uses NIPPV at home - not using device in the hospital - does not want to use BIPAP at present - sleep hygiene discussed - education and counseling  BPH - flomax - monitor sx  ProBNP - insignificant, VBG c/w COPD - co2 retention - pH balanced -   tolerating room air at rest  ct scan and labs reviewed with the patient  small sub cm pulm nodules - will follow up as outpatient with his PMD pulm - Dr. Car.

## 2020-07-05 LAB
ANION GAP SERPL CALC-SCNC: 6 MMOL/L — SIGNIFICANT CHANGE UP (ref 5–17)
BUN SERPL-MCNC: 12 MG/DL — SIGNIFICANT CHANGE UP (ref 7–23)
CALCIUM SERPL-MCNC: 8.2 MG/DL — LOW (ref 8.5–10.1)
CHLORIDE SERPL-SCNC: 104 MMOL/L — SIGNIFICANT CHANGE UP (ref 96–108)
CO2 SERPL-SCNC: 28 MMOL/L — SIGNIFICANT CHANGE UP (ref 22–31)
CREAT SERPL-MCNC: 0.77 MG/DL — SIGNIFICANT CHANGE UP (ref 0.5–1.3)
GLUCOSE SERPL-MCNC: 112 MG/DL — HIGH (ref 70–99)
HCT VFR BLD CALC: 41.9 % — SIGNIFICANT CHANGE UP (ref 39–50)
HGB BLD-MCNC: 14.6 G/DL — SIGNIFICANT CHANGE UP (ref 13–17)
MCHC RBC-ENTMCNC: 29.8 PG — SIGNIFICANT CHANGE UP (ref 27–34)
MCHC RBC-ENTMCNC: 34.8 GM/DL — SIGNIFICANT CHANGE UP (ref 32–36)
MCV RBC AUTO: 85.5 FL — SIGNIFICANT CHANGE UP (ref 80–100)
NRBC # BLD: 0 /100 WBCS — SIGNIFICANT CHANGE UP (ref 0–0)
PLATELET # BLD AUTO: 227 K/UL — SIGNIFICANT CHANGE UP (ref 150–400)
POTASSIUM SERPL-MCNC: 3.7 MMOL/L — SIGNIFICANT CHANGE UP (ref 3.5–5.3)
POTASSIUM SERPL-SCNC: 3.7 MMOL/L — SIGNIFICANT CHANGE UP (ref 3.5–5.3)
RBC # BLD: 4.9 M/UL — SIGNIFICANT CHANGE UP (ref 4.2–5.8)
RBC # FLD: 13 % — SIGNIFICANT CHANGE UP (ref 10.3–14.5)
SODIUM SERPL-SCNC: 138 MMOL/L — SIGNIFICANT CHANGE UP (ref 135–145)
WBC # BLD: 9.88 K/UL — SIGNIFICANT CHANGE UP (ref 3.8–10.5)
WBC # FLD AUTO: 9.88 K/UL — SIGNIFICANT CHANGE UP (ref 3.8–10.5)

## 2020-07-05 PROCEDURE — 99232 SBSQ HOSP IP/OBS MODERATE 35: CPT

## 2020-07-05 PROCEDURE — 71045 X-RAY EXAM CHEST 1 VIEW: CPT | Mod: 26

## 2020-07-05 RX ADMIN — Medication 325 MILLIGRAM(S): at 12:31

## 2020-07-05 RX ADMIN — FAMOTIDINE 20 MILLIGRAM(S): 10 INJECTION INTRAVENOUS at 05:46

## 2020-07-05 RX ADMIN — Medication 600 MILLIGRAM(S): at 05:45

## 2020-07-05 RX ADMIN — LOSARTAN POTASSIUM 100 MILLIGRAM(S): 100 TABLET, FILM COATED ORAL at 05:50

## 2020-07-05 RX ADMIN — Medication 25 MILLIGRAM(S): at 18:01

## 2020-07-05 RX ADMIN — AMLODIPINE BESYLATE 10 MILLIGRAM(S): 2.5 TABLET ORAL at 05:45

## 2020-07-05 RX ADMIN — BUDESONIDE AND FORMOTEROL FUMARATE DIHYDRATE 2 PUFF(S): 160; 4.5 AEROSOL RESPIRATORY (INHALATION) at 18:00

## 2020-07-05 RX ADMIN — BUDESONIDE AND FORMOTEROL FUMARATE DIHYDRATE 2 PUFF(S): 160; 4.5 AEROSOL RESPIRATORY (INHALATION) at 05:50

## 2020-07-05 RX ADMIN — FINASTERIDE 5 MILLIGRAM(S): 5 TABLET, FILM COATED ORAL at 12:31

## 2020-07-05 RX ADMIN — TAMSULOSIN HYDROCHLORIDE 0.4 MILLIGRAM(S): 0.4 CAPSULE ORAL at 21:52

## 2020-07-05 RX ADMIN — FAMOTIDINE 20 MILLIGRAM(S): 10 INJECTION INTRAVENOUS at 18:01

## 2020-07-05 RX ADMIN — ENOXAPARIN SODIUM 40 MILLIGRAM(S): 100 INJECTION SUBCUTANEOUS at 12:30

## 2020-07-05 RX ADMIN — ATORVASTATIN CALCIUM 10 MILLIGRAM(S): 80 TABLET, FILM COATED ORAL at 21:52

## 2020-07-05 RX ADMIN — Medication 600 MILLIGRAM(S): at 18:01

## 2020-07-05 RX ADMIN — Medication 25 MILLIGRAM(S): at 05:45

## 2020-07-05 NOTE — PROGRESS NOTE ADULT - SUBJECTIVE AND OBJECTIVE BOX
Date/Time Patient Seen:  		  Referring MD:   Data Reviewed	       Patient is a 87y old  Male who presents with a chief complaint of SBO (04 Jul 2020 12:34)      Subjective/HPI     PAST MEDICAL & SURGICAL HISTORY:  Pulmonary hypertension: moderate  COPD (chronic obstructive pulmonary disease): mild (no home oxygen)  Anxiety  CAD (coronary artery disease)  BPH (Benign Prostatic Hyperplasia)  Hyperlipemia  Hypertension  Detached retina, right: with repair  Bilateral cataracts: removed  H/O bilateral hip replacements        Medication list         MEDICATIONS  (STANDING):  amLODIPine   Tablet 10 milliGRAM(s) Oral daily  aspirin 325 milliGRAM(s) Oral daily  atorvastatin 10 milliGRAM(s) Oral at bedtime  budesonide  80 MICROgram(s)/formoterol 4.5 MICROgram(s) Inhaler 2 Puff(s) Inhalation two times a day  enoxaparin Injectable 40 milliGRAM(s) SubCutaneous daily  famotidine Injectable 20 milliGRAM(s) IV Push every 12 hours  finasteride 5 milliGRAM(s) Oral daily  guaiFENesin  milliGRAM(s) Oral every 12 hours  losartan 100 milliGRAM(s) Oral daily  metoprolol tartrate 25 milliGRAM(s) Oral two times a day  tamsulosin 0.4 milliGRAM(s) Oral at bedtime    MEDICATIONS  (PRN):  ALBUTerol    90 MICROgram(s) HFA Inhaler 2 Puff(s) Inhalation every 4 hours PRN Shortness of Breath and/or Wheezing  aluminum hydroxide/magnesium hydroxide/simethicone Suspension 30 milliLiter(s) Oral every 4 hours PRN Dyspepsia  ondansetron Injectable 4 milliGRAM(s) IV Push every 6 hours PRN Nausea         Vitals log        ICU Vital Signs Last 24 Hrs  T(C): 37 (05 Jul 2020 04:59), Max: 37 (05 Jul 2020 04:59)  T(F): 98.6 (05 Jul 2020 04:59), Max: 98.6 (05 Jul 2020 04:59)  HR: 72 (05 Jul 2020 04:59) (60 - 76)  BP: 165/83 (05 Jul 2020 04:59) (126/76 - 165/83)  BP(mean): --  ABP: --  ABP(mean): --  RR: 18 (05 Jul 2020 04:59) (18 - 18)  SpO2: 95% (05 Jul 2020 04:59) (95% - 98%)           Input and Output:  I&O's Detail    03 Jul 2020 07:01  -  04 Jul 2020 07:00  --------------------------------------------------------  IN:    Oral Fluid: 460 mL  Total IN: 460 mL    OUT:  Total OUT: 0 mL    Total NET: 460 mL          Lab Data                        14.0   8.34  )-----------( 220      ( 03 Jul 2020 06:47 )             38.9     07-04    138  |  104  |  8   ----------------------------<  105<H>  3.7   |  28  |  0.73    Ca    8.6      04 Jul 2020 08:22  Phos  3.2     07-03  Mg     2.1     07-04              Review of Systems	      Objective     Physical Examination    heart s1s2  lung dec BS  abd soft      Pertinent Lab findings & Imaging      Getachew:  NO   Adequate UO     I&O's Detail    03 Jul 2020 07:01  -  04 Jul 2020 07:00  --------------------------------------------------------  IN:    Oral Fluid: 460 mL  Total IN: 460 mL    OUT:  Total OUT: 0 mL    Total NET: 460 mL               Discussed with:     Cultures:	        Radiology

## 2020-07-05 NOTE — PROGRESS NOTE ADULT - SUBJECTIVE AND OBJECTIVE BOX
[facility] -- Makayla Malik, Denise Randall, Tejas Comer Savella  Office # [phone]      Follow Up:    CAD  Subjective/Observations:   No events overnight resting comfortably in bed.  No complaints of chest pain, dyspnea, or palpitations reported. No signs of orthopnea or PND.     REVIEW OF SYSTEMS: All other review of systems is negative unless indicated above    PAST MEDICAL & SURGICAL HISTORY:  Pulmonary hypertension: moderate  COPD (chronic obstructive pulmonary disease): mild (no home oxygen)  Anxiety  CAD (coronary artery disease)  BPH (Benign Prostatic Hyperplasia)  Hyperlipemia  Hypertension  Detached retina, right: with repair  Bilateral cataracts: removed  H/O bilateral hip replacements      MEDICATIONS  (STANDING):  amLODIPine   Tablet 10 milliGRAM(s) Oral daily  aspirin 325 milliGRAM(s) Oral daily  atorvastatin 10 milliGRAM(s) Oral at bedtime  budesonide  80 MICROgram(s)/formoterol 4.5 MICROgram(s) Inhaler 2 Puff(s) Inhalation two times a day  enoxaparin Injectable 40 milliGRAM(s) SubCutaneous daily  famotidine Injectable 20 milliGRAM(s) IV Push every 12 hours  finasteride 5 milliGRAM(s) Oral daily  guaiFENesin  milliGRAM(s) Oral every 12 hours  losartan 100 milliGRAM(s) Oral daily  metoprolol tartrate 25 milliGRAM(s) Oral two times a day  tamsulosin 0.4 milliGRAM(s) Oral at bedtime    MEDICATIONS  (PRN):  ALBUTerol    90 MICROgram(s) HFA Inhaler 2 Puff(s) Inhalation every 4 hours PRN Shortness of Breath and/or Wheezing  aluminum hydroxide/magnesium hydroxide/simethicone Suspension 30 milliLiter(s) Oral every 4 hours PRN Dyspepsia  ondansetron Injectable 4 milliGRAM(s) IV Push every 6 hours PRN Nausea      Allergies    penicillin (Hives)    Intolerances        Vital Signs Last 24 Hrs  T(C): 37 (05 Jul 2020 04:59), Max: 37 (05 Jul 2020 04:59)  T(F): 98.6 (05 Jul 2020 04:59), Max: 98.6 (05 Jul 2020 04:59)  HR: 72 (05 Jul 2020 04:59) (60 - 76)  BP: 165/83 (05 Jul 2020 04:59) (126/76 - 165/83)  BP(mean): --  RR: 18 (05 Jul 2020 04:59) (18 - 18)  SpO2: 95% (05 Jul 2020 04:59) (95% - 98%)    I&O's Summary        PHYSICAL EXAM:  TELE: Off tele   Constitutional: NAD, awake and alert, well-developed  HEENT: Moist Mucous Membranes, Anicteric  Pulmonary: Non-labored, breath sounds are clear bilaterally, No wheezing, crackles or rhonchi  Cardiovascular: Regular, S1 and S2 nl, No murmurs, rubs, gallops or clicks  Gastrointestinal: Bowel Sounds present, soft, nontender.   Lymph: No lymphadenopathy. No peripheral edema.  Skin: No visible rashes or ulcers.  Psych:  Mood & affect appropriate    LABS: All Labs Reviewed:                        14.6   9.88  )-----------( 227      ( 05 Jul 2020 06:44 )             41.9                         14.0   8.34  )-----------( 220      ( 03 Jul 2020 06:47 )             38.9     05 Jul 2020 06:44    138    |  104    |  12     ----------------------------<  112    3.7     |  28     |  0.77   04 Jul 2020 08:22    138    |  104    |  8      ----------------------------<  105    3.7     |  28     |  0.73   03 Jul 2020 06:47    142    |  107    |  10     ----------------------------<  98     3.3     |  29     |  0.82     Ca    8.2        05 Jul 2020 06:44  Ca    8.6        04 Jul 2020 08:22  Ca    8.2        03 Jul 2020 06:47  Phos  3.2       03 Jul 2020 06:47  Mg     2.1       04 Jul 2020 08:22  Mg     2.1       03 Jul 2020 06:47               ECG:  < from: 12 Lead ECG (06.26.20 @ 14:12) >  Ventricular Rate 71 BPM    Atrial Rate 71 BPM    P-R Interval 152 ms    QRS Duration 98 ms    Q-T Interval 400 ms    QTC Calculation(Bezet) 434 ms    P Axis 53 degrees    R Axis -46 degrees    T Axis 44 degrees    Diagnosis Line Normal sinus rhythm  Left anterior fascicular block  Abnormal ECG  No previous ECGs available  Confirmed by Eboni Martínez MD ([id number]) on 6/26/2020 4:41:12 PM    < end of copied text > [facility] -- Makayla Malik, Denise Randall, Tejas Comer Savella  Office # [phone]      Follow Up:    CAD  Subjective/Observations:   No events overnight resting comfortably in bed.  No complaints of chest pain, dyspnea, or palpitations reported. No signs of orthopnea or PND.     REVIEW OF SYSTEMS: All other review of systems is negative unless indicated above    PAST MEDICAL & SURGICAL HISTORY:  Pulmonary hypertension: moderate  COPD (chronic obstructive pulmonary disease): mild (no home oxygen)  Anxiety  CAD (coronary artery disease)  BPH (Benign Prostatic Hyperplasia)  Hyperlipemia  Hypertension  Detached retina, right: with repair  Bilateral cataracts: removed  H/O bilateral hip replacements      MEDICATIONS  (STANDING):  amLODIPine   Tablet 10 milliGRAM(s) Oral daily  aspirin 325 milliGRAM(s) Oral daily  atorvastatin 10 milliGRAM(s) Oral at bedtime  budesonide  80 MICROgram(s)/formoterol 4.5 MICROgram(s) Inhaler 2 Puff(s) Inhalation two times a day  enoxaparin Injectable 40 milliGRAM(s) SubCutaneous daily  famotidine Injectable 20 milliGRAM(s) IV Push every 12 hours  finasteride 5 milliGRAM(s) Oral daily  guaiFENesin  milliGRAM(s) Oral every 12 hours  losartan 100 milliGRAM(s) Oral daily  metoprolol tartrate 25 milliGRAM(s) Oral two times a day  tamsulosin 0.4 milliGRAM(s) Oral at bedtime    MEDICATIONS  (PRN):  ALBUTerol    90 MICROgram(s) HFA Inhaler 2 Puff(s) Inhalation every 4 hours PRN Shortness of Breath and/or Wheezing  aluminum hydroxide/magnesium hydroxide/simethicone Suspension 30 milliLiter(s) Oral every 4 hours PRN Dyspepsia  ondansetron Injectable 4 milliGRAM(s) IV Push every 6 hours PRN Nausea      Allergies    penicillin (Hives)    Intolerances        Vital Signs Last 24 Hrs  T(C): 37 (05 Jul 2020 04:59), Max: 37 (05 Jul 2020 04:59)  T(F): 98.6 (05 Jul 2020 04:59), Max: 98.6 (05 Jul 2020 04:59)  HR: 72 (05 Jul 2020 04:59) (60 - 76)  BP: 165/83 (05 Jul 2020 04:59) (126/76 - 165/83)  BP(mean): --  RR: 18 (05 Jul 2020 04:59) (18 - 18)  SpO2: 95% (05 Jul 2020 04:59) (95% - 98%)    I&O's Summary        PHYSICAL EXAM:  TELE: Off tele   Constitutional: NAD, awake and alert, well-developed  HEENT: Moist Mucous Membranes, Anicteric  Pulmonary: Non-labored, breath sounds are rhoncherous jessie, no wheezing  Cardiovascular: Regular, S1 and S2 nl, No murmurs, rubs, gallops or clicks  Gastrointestinal: Bowel Sounds present, soft, nontender.   Lymph: No lymphadenopathy. No peripheral edema.  Skin: No visible rashes or ulcers.  Psych:  Mood & affect appropriate    LABS: All Labs Reviewed:                        14.6   9.88  )-----------( 227      ( 05 Jul 2020 06:44 )             41.9                         14.0   8.34  )-----------( 220      ( 03 Jul 2020 06:47 )             38.9     05 Jul 2020 06:44    138    |  104    |  12     ----------------------------<  112    3.7     |  28     |  0.77   04 Jul 2020 08:22    138    |  104    |  8      ----------------------------<  105    3.7     |  28     |  0.73   03 Jul 2020 06:47    142    |  107    |  10     ----------------------------<  98     3.3     |  29     |  0.82     Ca    8.2        05 Jul 2020 06:44  Ca    8.6        04 Jul 2020 08:22  Ca    8.2        03 Jul 2020 06:47  Phos  3.2       03 Jul 2020 06:47  Mg     2.1       04 Jul 2020 08:22  Mg     2.1       03 Jul 2020 06:47               ECG:  < from: 12 Lead ECG (06.26.20 @ 14:12) >  Ventricular Rate 71 BPM    Atrial Rate 71 BPM    P-R Interval 152 ms    QRS Duration 98 ms    Q-T Interval 400 ms    QTC Calculation([place]) 434 ms    P Axis 53 degrees    R Axis -46 degrees    T Axis 44 degrees    Diagnosis Line Normal sinus rhythm  Left anterior fascicular block  Abnormal ECG  No previous ECGs available  Confirmed by Eboni Martínez MD ([id number]) on 6/26/2020 4:41:12 PM    < end of copied text >

## 2020-07-05 NOTE — PROGRESS NOTE ADULT - SUBJECTIVE AND OBJECTIVE BOX
Patient is a 87y old  Male who presents with a chief complaint of SBO (05 Jul 2020 11:29)      INTERVAL HPI/OVERNIGHT EVENTS: 86M, HTN, HL, CAD/stents, COPD/nocturnal BiPAP, BPH; adm n/v/abd pain - CT c/f SBO - failed conservative mgmt w/repeat CTAP showing continued SBO - s/p OR 7/1 for exlap w/neg findings - c/f ileus    MEDICATIONS  (STANDING):  amLODIPine   Tablet 10 milliGRAM(s) Oral daily  aspirin 325 milliGRAM(s) Oral daily  atorvastatin 10 milliGRAM(s) Oral at bedtime  budesonide  80 MICROgram(s)/formoterol 4.5 MICROgram(s) Inhaler 2 Puff(s) Inhalation two times a day  enoxaparin Injectable 40 milliGRAM(s) SubCutaneous daily  famotidine Injectable 20 milliGRAM(s) IV Push every 12 hours  finasteride 5 milliGRAM(s) Oral daily  guaiFENesin  milliGRAM(s) Oral every 12 hours  losartan 100 milliGRAM(s) Oral daily  metoprolol tartrate 25 milliGRAM(s) Oral two times a day  tamsulosin 0.4 milliGRAM(s) Oral at bedtime    MEDICATIONS  (PRN):  ALBUTerol    90 MICROgram(s) HFA Inhaler 2 Puff(s) Inhalation every 4 hours PRN Shortness of Breath and/or Wheezing  aluminum hydroxide/magnesium hydroxide/simethicone Suspension 30 milliLiter(s) Oral every 4 hours PRN Dyspepsia  ondansetron Injectable 4 milliGRAM(s) IV Push every 6 hours PRN Nausea      Allergies    penicillin (Hives)    Intolerances        REVIEW OF SYSTEMS:  CONSTITUTIONAL: No fever, weight loss, or fatigue  EYES: No eye pain, visual disturbances, or discharge  ENMT:  No difficulty hearing, tinnitus, vertigo; No sinus or throat pain  NECK: No pain or stiffness  BREASTS: No pain, masses, or nipple discharge  RESPIRATORY: No cough, wheezing, chills or hemoptysis; No shortness of breath  CARDIOVASCULAR: No chest pain, palpitations, dizziness, or leg swelling  GASTROINTESTINAL: No abdominal or epigastric pain. No nausea, vomiting, or hematemesis; No diarrhea or constipation. No melena or hematochezia.  GENITOURINARY: No dysuria, frequency, hematuria, or incontinence  NEUROLOGICAL: No headaches, memory loss, loss of strength, numbness, or tremors  SKIN: No itching, burning, rashes, or lesions   LYMPH NODES: No enlarged glands  ENDOCRINE: No heat or cold intolerance; No hair loss; No polydipsia or polyuria  MUSCULOSKELETAL: No joint pain or swelling; No muscle, back, or extremity pain  PSYCHIATRIC: No depression, anxiety, mood swings, or difficulty sleeping  HEME/LYMPH: No easy bruising, or bleeding gums  ALLERGY AND IMMUNOLOGIC: No hives or eczema    Vital Signs Last 24 Hrs  T(C): 37 (05 Jul 2020 04:59), Max: 37 (05 Jul 2020 04:59)  T(F): 98.6 (05 Jul 2020 04:59), Max: 98.6 (05 Jul 2020 04:59)  HR: 72 (05 Jul 2020 04:59) (60 - 76)  BP: 165/83 (05 Jul 2020 04:59) (126/76 - 165/83)  BP(mean): --  RR: 18 (05 Jul 2020 04:59) (18 - 18)  SpO2: 95% (05 Jul 2020 04:59) (95% - 98%)    PHYSICAL EXAM:  GENERAL: NAD, well-groomed, well-developed  HEAD:  Atraumatic, Normocephalic  EYES: EOMI, PERRLA, conjunctiva and sclera clear  ENMT: No tonsillar erythema, exudates, or enlargement; Moist mucous membranes, Good dentition, No lesions  NECK: Supple, No JVD, Normal thyroid  NERVOUS SYSTEM:  Alert & Oriented X3, Good concentration; Motor Strength 5/5 B/L upper and lower extremities; DTRs 2+ intact and symmetric  CHEST/LUNG: Clear to auscultation bilaterally; No rales, rhonchi, wheezing, or rubs  HEART: Regular rate and rhythm; No murmurs, rubs, or gallops  ABDOMEN: Soft, Nontender, Nondistended; Bowel sounds present  EXTREMITIES:  2+ Peripheral Pulses, No clubbing, cyanosis, or edema  LYMPH: No lymphadenopathy noted  SKIN: No rashes or lesions    LABS:                        14.6   9.88  )-----------( 227      ( 05 Jul 2020 06:44 )             41.9     05 Jul 2020 06:44    138    |  104    |  12     ----------------------------<  112    3.7     |  28     |  0.77     Ca    8.2        05 Jul 2020 06:44          CAPILLARY BLOOD GLUCOSE          RADIOLOGY & ADDITIONAL TESTS:    Imaging Personally Reviewed:  [ ] YES  [ ] NO    Consultant(s) Notes Reviewed:  [ ] YES  [ ] NO    Care Discussed with Consultants/Other Providers [ ] YES  [ ] NO

## 2020-07-05 NOTE — PROGRESS NOTE ADULT - ASSESSMENT
87 yo M PMHx of CAD s/p MI and stents to RCA and LAD 1997, COPD (on bipap at night), HTN, HLD, BPH presents to the ED with 2 days of abdominal pain and N/V. Found to have SBO    CAD with normal LV function  - Had a nuclear stress on 6/24  which showed small, mod fixed defect apex EF 68% which was unchanged from his previous test 06/2019.   - TTE 01/2020 EF 72%, mild LVH, mild AI/PI/TR, sev LAE, PASP 38-43mmHg  - No s/s acute ischemia or HF at this time  - Continue ASA  - Continue statin  - Continue metoprolol, losartan, Norvasc    HTN  - mild LVH on TTE  - well controlled   - Continue BB, ARB, CCB    Hyperlipidemia  - Continue statin     SBO  - S/P LAPAROSCOPY w/o cardiac complications  - BP well controlled, monitor routine hemodynamic   - per primary /surgical team    COPD  - Cont inhalers as per Pulm  - Encourage C&DB and I/S    - Monitor and replete lytes, keep K>4, Mg>2.  - All other medical needs as per primary team.  - Other cardiovascular workup will depend on clinical course.  - Will continue to follow.  Main Jordan DNP, ANP-c  Cardiology   Spectra #[phone]

## 2020-07-05 NOTE — PROGRESS NOTE ADULT - PROBLEM SELECTOR PLAN 1
falguni - cpap - will order for night time - sleep hygiene discussed -   sbo - post op - Lap Ex - Surgery follow up noted - on Full Liquid diet - CT scan noted  ambulate - out of bed - I miles - post op care and management  COPD - proventil and symbicort - mucienx - I miles - post op  follows with Dr. Car in the office - ex smoker -   BPH - flomax - monitor sx  ProBNP - insignificant, VBG c/w COPD - co2 retention - pH balanced -   tolerating room air at rest  ct scan and labs reviewed with the patient  small sub cm pulm nodules - will follow up as outpatient with his PMD pulm - Dr. Car.

## 2020-07-05 NOTE — CHART NOTE - NSCHARTNOTEFT_GEN_A_CORE
Assessment: Pt seen for malnutrition follow-up. As per chart pt is a 87 year old male with a PMH of HTN, HL, CAD/stents, COPD/nocturnal BiPAP, BPH; adm n/v/abd pain - CT c/f SBO - failed conservative mgmt w/repeat CTAP showing continued SBO - s/p OR 7/1 for exlap w/neg findings - c/f ileus.     Pt recently advanced to DASH/TLC, low fiber diet yesterday (7/4). Pt reports that he is tolerating the diet well, with no issues. Denies any nausea/vomiting/ diarrhea/ constipation, reports that he has not yet had a solid BM.     Factors impacting intake: [x ] none [ ] nausea  [ ] vomiting [ ] diarrhea [ ] constipation  [ ]chewing problems [ ] swallowing issues  [ ] other:     Diet Presciption: Diet, DASH/TLC:   Sodium & Cholesterol Restricted  Fiber/Residue Restricted (07-04-20 @ 12:34)    Intake: good     Current Weight: no updated weight in chart  Previous Weight 203.7lbs  Admission Weight: 210.2lbs   % Weight Change- recommend to obtain and trend pt's daily body weights     Pertinent Medications: MEDICATIONS  (STANDING):  amLODIPine   Tablet 10 milliGRAM(s) Oral daily  aspirin 325 milliGRAM(s) Oral daily  atorvastatin 10 milliGRAM(s) Oral at bedtime  budesonide  80 MICROgram(s)/formoterol 4.5 MICROgram(s) Inhaler 2 Puff(s) Inhalation two times a day  enoxaparin Injectable 40 milliGRAM(s) SubCutaneous daily  famotidine Injectable 20 milliGRAM(s) IV Push every 12 hours  finasteride 5 milliGRAM(s) Oral daily  guaiFENesin  milliGRAM(s) Oral every 12 hours  losartan 100 milliGRAM(s) Oral daily  metoprolol tartrate 25 milliGRAM(s) Oral two times a day  tamsulosin 0.4 milliGRAM(s) Oral at bedtime    MEDICATIONS  (PRN):  ALBUTerol    90 MICROgram(s) HFA Inhaler 2 Puff(s) Inhalation every 4 hours PRN Shortness of Breath and/or Wheezing  aluminum hydroxide/magnesium hydroxide/simethicone Suspension 30 milliLiter(s) Oral every 4 hours PRN Dyspepsia  ondansetron Injectable 4 milliGRAM(s) IV Push every 6 hours PRN Nausea    Pertinent Labs: 07-05 Na138 mmol/L Glu 112 mg/dL<H> K+ 3.7 mmol/L Cr  0.77 mg/dL BUN 12 mg/dL, Calcium 8.2,  07-03 Phos 3.2 mg/dL 06-29 Alb 3.1 g/dL<L>     CAPILLARY BLOOD GLUCOSE    Skin: no edema or pressure injuries noted at this time     Estimated Needs:   [ x] no change since previous assessment  [ ] recalculated:     Previous Nutrition Diagnosis:   [x ] Altered GI Function  [x ] Malnutrition     Nutrition Diagnosis is [x ] ongoing-being addressed with therapeutic diet and good PO intake    New Nutrition Diagnosis: [x ] not applicable       Interventions: Continue with current DASH/TLC, low fiber diet   Recommend  [ ] Change Diet To:  [ ] Nutrition Supplement  [ ] Nutrition Support  [x ] Other:   1) Pt encouraged to continue good PO intake  2) Monitor pt's PO intake, weight, skin, edema, GI distress     Monitoring and Evaluation:   [x ] PO intake [ x ] Tolerance to diet prescription [ x ] weights [ x ] labs[ x ] follow up per protocol  [ ] other:

## 2020-07-05 NOTE — PROGRESS NOTE ADULT - ASSESSMENT
86M, HTN, HL, CAD/stents, COPD/nocturnal BiPAP, BPH; adm n/v/abd pain - CT c/f SBO - failed conservative mgmt w/repeat CTAP showing continued SBO - s/p OR 7/1 for exlap w/neg findings - c/f ileus    Ileus - failed conservative mgmt for SBO  -no e/o colonic obstructive process - s/p exlap 7/1 w/neg findings for SBO   -postop - started passing flatus, diet advanced to low fiber diet - monitoring tolerance  -prn analgesia  -prn antiemetics    volume depletion - gentle IVFs    HTN, HL, CAD - continued lipitor, metoprolol, norvasc, losartan; holding asa perioperatively    COPD - stable; no e/o decompensation - continue nocturnal BiPAP; Pulm consulted to optimize med regimen, check CXR for some rhochi and wheezing     BPH - continued regimen of flomax, finasteride    dvt prophy

## 2020-07-06 ENCOUNTER — TRANSCRIPTION ENCOUNTER (OUTPATIENT)
Age: 85
End: 2020-07-06

## 2020-07-06 ENCOUNTER — APPOINTMENT (OUTPATIENT)
Dept: PULMONOLOGY | Facility: CLINIC | Age: 85
End: 2020-07-06

## 2020-07-06 VITALS
OXYGEN SATURATION: 98 % | HEART RATE: 69 BPM | SYSTOLIC BLOOD PRESSURE: 121 MMHG | DIASTOLIC BLOOD PRESSURE: 72 MMHG | TEMPERATURE: 98 F | RESPIRATION RATE: 18 BRPM

## 2020-07-06 PROCEDURE — 80048 BASIC METABOLIC PNL TOTAL CA: CPT

## 2020-07-06 PROCEDURE — 74176 CT ABD & PELVIS W/O CONTRAST: CPT

## 2020-07-06 PROCEDURE — 84443 ASSAY THYROID STIM HORMONE: CPT

## 2020-07-06 PROCEDURE — 99232 SBSQ HOSP IP/OBS MODERATE 35: CPT

## 2020-07-06 PROCEDURE — 86901 BLOOD TYPING SEROLOGIC RH(D): CPT

## 2020-07-06 PROCEDURE — 80053 COMPREHEN METABOLIC PANEL: CPT

## 2020-07-06 PROCEDURE — 83735 ASSAY OF MAGNESIUM: CPT

## 2020-07-06 PROCEDURE — 36415 COLL VENOUS BLD VENIPUNCTURE: CPT

## 2020-07-06 PROCEDURE — 84484 ASSAY OF TROPONIN QUANT: CPT

## 2020-07-06 PROCEDURE — 99285 EMERGENCY DEPT VISIT HI MDM: CPT

## 2020-07-06 PROCEDURE — 86900 BLOOD TYPING SEROLOGIC ABO: CPT

## 2020-07-06 PROCEDURE — 74177 CT ABD & PELVIS W/CONTRAST: CPT

## 2020-07-06 PROCEDURE — A9698: CPT

## 2020-07-06 PROCEDURE — 74018 RADEX ABDOMEN 1 VIEW: CPT

## 2020-07-06 PROCEDURE — 85027 COMPLETE CBC AUTOMATED: CPT

## 2020-07-06 PROCEDURE — 82803 BLOOD GASES ANY COMBINATION: CPT

## 2020-07-06 PROCEDURE — 99239 HOSP IP/OBS DSCHRG MGMT >30: CPT

## 2020-07-06 PROCEDURE — 83880 ASSAY OF NATRIURETIC PEPTIDE: CPT

## 2020-07-06 PROCEDURE — 94660 CPAP INITIATION&MGMT: CPT

## 2020-07-06 PROCEDURE — 97161 PT EVAL LOW COMPLEX 20 MIN: CPT

## 2020-07-06 PROCEDURE — 94640 AIRWAY INHALATION TREATMENT: CPT

## 2020-07-06 PROCEDURE — 84100 ASSAY OF PHOSPHORUS: CPT

## 2020-07-06 PROCEDURE — 71045 X-RAY EXAM CHEST 1 VIEW: CPT

## 2020-07-06 PROCEDURE — 87635 SARS-COV-2 COVID-19 AMP PRB: CPT

## 2020-07-06 PROCEDURE — 86850 RBC ANTIBODY SCREEN: CPT

## 2020-07-06 RX ORDER — ALBUTEROL 90 UG/1
2 AEROSOL, METERED ORAL
Qty: 1 | Refills: 0
Start: 2020-07-06 | End: 2020-08-04

## 2020-07-06 RX ORDER — BUDESONIDE AND FORMOTEROL FUMARATE DIHYDRATE 160; 4.5 UG/1; UG/1
1 AEROSOL RESPIRATORY (INHALATION)
Qty: 1 | Refills: 0
Start: 2020-07-06 | End: 2020-08-04

## 2020-07-06 RX ADMIN — Medication 25 MILLIGRAM(S): at 05:34

## 2020-07-06 RX ADMIN — ENOXAPARIN SODIUM 40 MILLIGRAM(S): 100 INJECTION SUBCUTANEOUS at 12:10

## 2020-07-06 RX ADMIN — AMLODIPINE BESYLATE 10 MILLIGRAM(S): 2.5 TABLET ORAL at 05:34

## 2020-07-06 RX ADMIN — LOSARTAN POTASSIUM 100 MILLIGRAM(S): 100 TABLET, FILM COATED ORAL at 05:34

## 2020-07-06 RX ADMIN — Medication 325 MILLIGRAM(S): at 12:10

## 2020-07-06 RX ADMIN — FINASTERIDE 5 MILLIGRAM(S): 5 TABLET, FILM COATED ORAL at 12:10

## 2020-07-06 RX ADMIN — FAMOTIDINE 20 MILLIGRAM(S): 10 INJECTION INTRAVENOUS at 05:34

## 2020-07-06 RX ADMIN — Medication 600 MILLIGRAM(S): at 05:34

## 2020-07-06 RX ADMIN — BUDESONIDE AND FORMOTEROL FUMARATE DIHYDRATE 2 PUFF(S): 160; 4.5 AEROSOL RESPIRATORY (INHALATION) at 11:51

## 2020-07-06 NOTE — PROGRESS NOTE ADULT - SUBJECTIVE AND OBJECTIVE BOX
INTERVAL HPI/OVERNIGHT EVENTS:  pt seen and examined  denies  n/v/abd pain  had formed bm this am  tolerating diet  no new labs    MEDICATIONS  (STANDING):  amLODIPine   Tablet 10 milliGRAM(s) Oral daily  aspirin 325 milliGRAM(s) Oral daily  atorvastatin 10 milliGRAM(s) Oral at bedtime  budesonide  80 MICROgram(s)/formoterol 4.5 MICROgram(s) Inhaler 2 Puff(s) Inhalation two times a day  enoxaparin Injectable 40 milliGRAM(s) SubCutaneous daily  famotidine Injectable 20 milliGRAM(s) IV Push every 12 hours  finasteride 5 milliGRAM(s) Oral daily  guaiFENesin  milliGRAM(s) Oral every 12 hours  losartan 100 milliGRAM(s) Oral daily  metoprolol tartrate 25 milliGRAM(s) Oral two times a day  tamsulosin 0.4 milliGRAM(s) Oral at bedtime    MEDICATIONS  (PRN):  ALBUTerol    90 MICROgram(s) HFA Inhaler 2 Puff(s) Inhalation every 4 hours PRN Shortness of Breath and/or Wheezing  aluminum hydroxide/magnesium hydroxide/simethicone Suspension 30 milliLiter(s) Oral every 4 hours PRN Dyspepsia  ondansetron Injectable 4 milliGRAM(s) IV Push every 6 hours PRN Nausea      Allergies    penicillin (Hives)    Intolerances        Review of Systems:    General:  No wt loss, fevers, chills, night sweats, fatigue   Eyes:  Good vision, no reported pain  ENT:  No sore throat, pain, runny nose, dysphagia  CV:  No pain, palpitations, hypo/hypertension  Resp:  No dyspnea, cough, tachypnea, wheezing  GI:  No pain, No nausea, No vomiting, No diarrhea, No constipation, No weight loss, No fever, No pruritis, No rectal bleeding, No melena, No dysphagia  :  No pain, bleeding, incontinence, nocturia  Muscle:  No pain, weakness  Neuro:  No weakness, tingling, memory problems  Psych:  No fatigue, insomnia, mood problems, depression  Endocrine:  No polyuria, polydypsia, cold/heat intolerance  Heme:  No petechiae, ecchymosis, easy bruisability  Skin:  No rash, tattoos, scars, edema      Vital Signs Last 24 Hrs  T(C): 36.7 (06 Jul 2020 05:05), Max: 36.7 (06 Jul 2020 05:05)  T(F): 98.1 (06 Jul 2020 05:05), Max: 98.1 (06 Jul 2020 05:05)  HR: 76 (06 Jul 2020 05:05) (54 - 76)  BP: 164/81 (06 Jul 2020 05:05) (115/67 - 164/81)  BP(mean): --  RR: 18 (06 Jul 2020 05:05) (18 - 18)  SpO2: 96% (06 Jul 2020 05:05) (93% - 96%)    PHYSICAL EXAM:    Constitutional: NAD  HEENT: ncat  Gastrointestinal: soft nt +dt  Extremities: No peripheral edema  Vascular: + peripheral pulses  Neurological: A/O x 3      LABS:                        14.6   9.88  )-----------( 227      ( 05 Jul 2020 06:44 )             41.9     07-05    138  |  104  |  12  ----------------------------<  112<H>  3.7   |  28  |  0.77    Ca    8.2<L>      05 Jul 2020 06:44            RADIOLOGY & ADDITIONAL TESTS:

## 2020-07-06 NOTE — PROGRESS NOTE ADULT - PROVIDER SPECIALTY LIST ADULT
Cardiology
Hospitalist
Surgery
Cardiology
Gastroenterology
Hospitalist
Surgery
Surgery
Anesthesia
Cardiology
Cardiology
Surgery
Cardiology
Cardiology
Hospitalist
Surgery
Gastroenterology
Gastroenterology
Pulmonology
Pulmonology
Gastroenterology
Pulmonology
Gastroenterology
Hospitalist
Hospitalist

## 2020-07-06 NOTE — PROGRESS NOTE ADULT - SUBJECTIVE AND OBJECTIVE BOX
[facility] -- Makayla Malik, Denise Randall, Tejas Comer [place], [place]  Office # [phone]    Follow Up:    CAD  Subjective/Observations:     Patient is comfortable in bed with no complaints of chest pain or shortness of breath.    REVIEW OF SYSTEMS: All other review of systems is negative unless indicated above  PAST MEDICAL & SURGICAL HISTORY:  Pulmonary hypertension: moderate  COPD (chronic obstructive pulmonary disease): mild (no home oxygen)  Anxiety  CAD (coronary artery disease)  BPH (Benign Prostatic Hyperplasia)  Hyperlipemia  Hypertension  Detached retina, right: with repair  Bilateral cataracts: removed  H/O bilateral hip replacements    MEDICATIONS  (STANDING):  amLODIPine   Tablet 10 milliGRAM(s) Oral daily  aspirin 325 milliGRAM(s) Oral daily  atorvastatin 10 milliGRAM(s) Oral at bedtime  budesonide  80 MICROgram(s)/formoterol 4.5 MICROgram(s) Inhaler 2 Puff(s) Inhalation two times a day  enoxaparin Injectable 40 milliGRAM(s) SubCutaneous daily  famotidine Injectable 20 milliGRAM(s) IV Push every 12 hours  finasteride 5 milliGRAM(s) Oral daily  guaiFENesin  milliGRAM(s) Oral every 12 hours  losartan 100 milliGRAM(s) Oral daily  metoprolol tartrate 25 milliGRAM(s) Oral two times a day  tamsulosin 0.4 milliGRAM(s) Oral at bedtime    MEDICATIONS  (PRN):  ALBUTerol    90 MICROgram(s) HFA Inhaler 2 Puff(s) Inhalation every 4 hours PRN Shortness of Breath and/or Wheezing  aluminum hydroxide/magnesium hydroxide/simethicone Suspension 30 milliLiter(s) Oral every 4 hours PRN Dyspepsia  ondansetron Injectable 4 milliGRAM(s) IV Push every 6 hours PRN Nausea    Allergies    penicillin (Hives)    Intolerances      Vital Signs Last 24 Hrs  T(C): 36.7 (06 Jul 2020 05:05), Max: 36.7 (06 Jul 2020 05:05)  T(F): 98.1 (06 Jul 2020 05:05), Max: 98.1 (06 Jul 2020 05:05)  HR: 76 (06 Jul 2020 05:05) (54 - 76)  BP: 164/81 (06 Jul 2020 05:05) (115/67 - 164/81)  BP(mean): --  RR: 18 (06 Jul 2020 05:05) (18 - 18)  SpO2: 96% (06 Jul 2020 05:05) (93% - 96%)  I&O's Summary      PHYSICAL EXAM:  TELE: off  Constitutional: NAD, awake and alert, well-developed  HEENT: Moist Mucous Membranes, Anicteric  Pulmonary: slight crackles, No wheezing, rales or rhonchi  Cardiovascular: Regular, S1 and S2, No murmurs, rubs, gallops or clicks  Gastrointestinal: Bowel Sounds present, soft, nontender.   Lymph: No peripheral edema. No lymphadenopathy.  Skin: No visible rashes or ulcers.  Psych:  Mood & affect appropriate  LABS: All Labs Reviewed:                        14.6   9.88  )-----------( 227      ( 05 Jul 2020 06:44 )             41.9     05 Jul 2020 06:44    138    |  104    |  12     ----------------------------<  112    3.7     |  28     |  0.77   04 Jul 2020 08:22    138    |  104    |  8      ----------------------------<  105    3.7     |  28     |  0.73     Ca    8.2        05 Jul 2020 06:44  Ca    8.6        04 Jul 2020 08:22  Mg     2.1       04 Jul 2020 08:22     ECG:  < from: 12 Lead ECG (06.26.20 @ 14:12) >  Ventricular Rate 71 BPM    Atrial Rate 71 BPM    P-R Interval 152 ms    QRS Duration 98 ms    Q-T Interval 400 ms    QTC Calculation(Bezet) 434 ms    P Axis 53 degrees    R Axis -46 degrees    T Axis 44 degrees    Diagnosis Line Normal sinus rhythm  Left anterior fascicular block  Abnormal ECG  No previous ECGs available  Confirmed by Eboni Martínez MD ([id number]) on 6/26/2020 4:41:12 PM    < end of copied text >

## 2020-07-06 NOTE — PROGRESS NOTE ADULT - ASSESSMENT
85 yo M PMHx of CAD s/p MI and stents to RCA and LAD 1997, COPD (on bipap at night), HTN, HLD, BPH presents to the ED with 2 days of abdominal pain and N/V. Found to have SBO    CAD with normal LV function  - Had a nuclear stress on 6/24  which showed small, mod fixed defect apex EF 68% which was unchanged from his previous test 06/2019.   - TTE 01/2020 EF 72%, mild LVH, mild AI/PI/TR, sev LAE, PASP 38-43mmHg  - No s/s acute ischemia or HF at this time  - Continue ASA  - Continue statin  - Continue metoprolol, losartan, Norvasc    HTN  -BP slightly elevated to SBP of 164   - Continue  current regimen of lopressor 25 bid, amlodopine 10 qd and  losartan 100 mg po qd    Hyperlipidemia  - Continue statin     SBO  - S/P LAPAROSCOPY w/o cardiac complications  - per primary /surgical team    COPD  - Cont inhalers as per Pulm  - Encourage C&DB and I/S    - Monitor and replete lytes, keep K>4, Mg>2.  - All other medical needs as per primary team.  - Other cardiovascular workup will depend on clinical course.  - Will continue to follow.  Yissel Up, [facility]  Cardiology  [phone]

## 2020-07-06 NOTE — PROGRESS NOTE ADULT - PROBLEM SELECTOR PROBLEM 1
COPD (chronic obstructive pulmonary disease)
Ileus
Small bowel obstruction
Ileus
Ileus
Small bowel obstruction

## 2020-07-06 NOTE — PROGRESS NOTE ADULT - REASON FOR ADMISSION
SBO

## 2020-07-06 NOTE — DISCHARGE NOTE NURSING/CASE MANAGEMENT/SOCIAL WORK - PATIENT PORTAL LINK FT
You can access the FollowMyHealth Patient Portal offered by [facility] by registering at the following website: [email or web address]. By joining [employer]’s FollowMyHealth portal, you will also be able to view your health information using other applications (apps) compatible with our system.

## 2020-07-06 NOTE — PROGRESS NOTE ADULT - PROBLEM SELECTOR PLAN 1
falguni - did not use CPAP device overnight - prefers BIPAP - plans for DC in am as per pt  sbo - post op - Lap Ex - Surgery follow up noted - on Dash Diet - CT scan noted  ambulate - out of bed - I miles - post op care and management  COPD - proventil and symbicort - mucienx - I miles - post op  follows with Dr. Car in the office - ex smoker -   BPH - flomax - monitor sx  ProBNP - insignificant, VBG c/w COPD - co2 retention - pH balanced -   tolerating room air at rest  ct scan and labs reviewed with the patient  small sub cm pulm nodules - will follow up as outpatient with his PMD pulm - Dr. Car.

## 2020-07-07 ENCOUNTER — RX RENEWAL (OUTPATIENT)
Age: 85
End: 2020-07-07

## 2020-09-13 ENCOUNTER — RX RENEWAL (OUTPATIENT)
Age: 85
End: 2020-09-13

## 2020-12-02 NOTE — CONSULT LETTER
[Dear  ___] : Dear  [unfilled], [Courtesy Letter:] : I had the pleasure of seeing your patient, [unfilled], in my office today. [Sincerely,] : Sincerely, [DrKia  ___] : Dr. ORTIZ [Galilea Car MD, FCCP] : Galilea Car MD, FCCP [Director, Pulmonary Hypertension Program] : Director, Pulmonary Hypertension Program [[facility]] : [facility] [Division of Pulmonary, Critical Care and Sleep Medicine] : Division of Pulmonary, Critical Care and Sleep Medicine [Associate Professor of Medicine] : Associate Professor of Medicine

## 2020-12-03 ENCOUNTER — APPOINTMENT (OUTPATIENT)
Dept: PULMONOLOGY | Facility: CLINIC | Age: 85
End: 2020-12-03
Payer: MEDICARE

## 2020-12-03 ENCOUNTER — RX RENEWAL (OUTPATIENT)
Age: 85
End: 2020-12-03

## 2020-12-03 VITALS
TEMPERATURE: 98.1 F | RESPIRATION RATE: 17 BRPM | HEART RATE: 72 BPM | OXYGEN SATURATION: 96 % | SYSTOLIC BLOOD PRESSURE: 166 MMHG | DIASTOLIC BLOOD PRESSURE: 68 MMHG | WEIGHT: 214.25 LBS | BODY MASS INDEX: 31.64 KG/M2

## 2020-12-03 LAB
BASOPHILS # BLD AUTO: 0.06 K/UL
BASOPHILS NFR BLD AUTO: 0.8 %
EOSINOPHIL # BLD AUTO: 0.18 K/UL
EOSINOPHIL NFR BLD AUTO: 2.4 %
HCT VFR BLD CALC: 44.4 %
HGB BLD-MCNC: 15 G/DL
IMM GRANULOCYTES NFR BLD AUTO: 0.5 %
LYMPHOCYTES # BLD AUTO: 1.07 K/UL
LYMPHOCYTES NFR BLD AUTO: 14.3 %
MAN DIFF?: NORMAL
MCHC RBC-ENTMCNC: 30.5 PG
MCHC RBC-ENTMCNC: 33.8 GM/DL
MCV RBC AUTO: 90.4 FL
MONOCYTES # BLD AUTO: 0.8 K/UL
MONOCYTES NFR BLD AUTO: 10.7 %
NEUTROPHILS # BLD AUTO: 5.35 K/UL
NEUTROPHILS NFR BLD AUTO: 71.3 %
NT-PROBNP SERPL-MCNC: 92 PG/ML
PLATELET # BLD AUTO: 219 K/UL
RBC # BLD: 4.91 M/UL
RBC # FLD: 13.2 %
WBC # FLD AUTO: 7.5 K/UL

## 2020-12-03 PROCEDURE — 36415 COLL VENOUS BLD VENIPUNCTURE: CPT

## 2020-12-03 PROCEDURE — 99072 ADDL SUPL MATRL&STAF TM PHE: CPT

## 2020-12-03 PROCEDURE — 99215 OFFICE O/P EST HI 40 MIN: CPT | Mod: 25

## 2020-12-03 NOTE — HISTORY OF PRESENT ILLNESS
[TextBox_4] : -----This letter  is regarding your patient  who  attended pulmonary out patient office today.  I have reviewed  patient's  past history, social history, family history and medication list. I also  reviewed nurse practitioners/ and fellows  notes and assessment and agree with it.  –The patient was referred by ------\par \par 86 yr -year-old male history of past smoking, elevated PA S/P on echo-----History of coronary artery disease -S/P STENT PALCEMENT [ RCA  stent june 0217 ].--  --  \par \par .no history of , fever, chills , rigors, chestpain, or hemoptysis. Questionable history of Raynauds phenomenon. No h/o significant weight loss in last few months. No history of liver dysfunction , collagen vascular disorder or chronic thromboembolic disease. I would classify her dyspnea as WHO  FUNCTIONAL CLASS II--------\par \par -\par \par ECHO  --  --12/2016 severe LAE, moderate TREY EF=68%, est PASP= 48-53mmHg-------\par ---Echo  date-------JUN 2018------RVSP 45-50 mmhg, moderately elevated-----mild RA enlargement----mild to moderate AR, mild MR----\par ECHO 12/2018--- [ DR SANCHEZ]   -dilated LV LVEF 70 LVH with diastolic dysfunction moderate aortic regurgitation PS the 45, SEVER DILATED LA \par \par ---PSG ---JC  ON BIPAP 16/9\par \par ----Pft date---------7/2017--hyperinflation\par \par ----Ct scanchest 7/27/17 IMPRESSION:\par \par Mild  dilatation  of  the  main  pulmonary  artery,  which  can  be  seen  in  pulmonary\par arterial  hypertension.\par \par No  traction  bronchiectasis  or  honeycombing  to  suggest  pulmonary  fibrosis.\par \par Nonspecific  4  mm  nodule  in  the  left  lower  lobe.  Follow-up  in  one  year  with  a\par low-dose  noncontrast  CT  scan  of  the  chest  can  be  obtained  to  evaluate  for\par stability.\par \par 6  mm  nondependent  opacity  within  the  trachea  may  represent  adherent\par secretions;  attention  to  this  area  on  the  follow-up  exam  is  advised  to\par evaluate  for  resolution  and  to  exclude  an  endotracheal  lesion.\par \par \par ct 11/2018 \par IMPRESSION: \par 1. The 3 mm noncalcified nodule is unchanged since 7/27/2017. \par \par -----Cath date----------June 19 2017--stent proximal  RCA-\par \par ----SEPT 2017---------coming for follow up---------is getting whitish mucous------has been told by ENT [Dr. Gage LANDRY] that mucous is related to acid reflux-----------had a sleep study completed [AHI 75] t 90% 8.1-------\par \par sept 2019  \par JC  remains compliant with biPAP----and benefits from its use--\par  secondary pulm htn -recently started on lasix for edema,  follows cardiology DR SANCHEZ [[facility]]\par COPD_ using symbicort 160 bid- doing well from resp perspective \par \par ------JAN 2020------pt stable from pulmonary point of view-----remains compliant with BiPAP-----using symbicort and ventolin----\par \par dec 2020 -- pt complaining of increasing clear sputum production and SOB on exertion. Started mucinex in Sept 2020, noticed sputum has been more loose. Pt was recently hospitalized for bowel rest after gastric issues. Pt's wife recently passed in July 2020. Compliant with BiPAP at night. ---on  WIXELA MDU AND VENTOLIN PRN ---ADD FLONASE -- -COURSE OF Z PACK--SHORT COURSE OF PREDNISONE TO HELP COUGH---NEEDS REPEAT CT CHEST----CONDT CPAP

## 2020-12-03 NOTE — DISCUSSION/SUMMARY
[FreeTextEntry1] :  Assessment plan---------- patient has been referred here for further opinion regarding pulmonary problem-----------------86  -year-old male past history of smoking history of coronary disease [  RCA STENT JUNE 2017]-with secondary pulmonary hypertension\par \par 1 COPD --- using wixella daily and ventolin as needed. repeat PFT and ct chest 1/2020\par \par 2-SECONDARY --pulmonary hypertension----appear secondary to his underlying LV dysfunction--[ RCA  stent june 0217,  DIASTOLIC DYSFUNCTION, AORTIC VALVE DISEASE ---i don't think there is any role for pulmonary vasodilator treatment in this situation----I did bring up the issue of right heart catheterization to better define the etiology of pulmonary hypertension----patient at this time feels that he is exercise  tolerance is acceptable-----HE  HAS  AORTIC REGURGITATION AND IS  BEING MANAGED BY DR SANCHEZ---  keep euvolemic- on lasix. Check labs today (creat, electrolytes) --- add aldactone dec 2020. ECHO with Dr. Sanchez 1/16/2020 --- LV normal, LA dilated, RA mild dilated, EF 72%, LVH PASP 38\par \par 3--JC--ON BIPAP nightly and benefits from its use ---COMPLIANCE REPORT\par \par 4 - labs -- covid antibodies, CMP, CBC\par \par 5 --FLONASE  nasal spray for postnasal drip\par \par 6   CH COUGH--HYPEREACTIVITY AND POST NASAL DRIP-- will start prednisone 5mg   [CHORT COURSE ] and antibiotics--zithromax\par \par 7 -- repeat chest CT in Jan 2021\par \par 8 -- Follow up in March/April 2021\par \par ----labs done today --------Thanks for allowing  me to participate  in the care of this patient.  Patient at this time  will follow  the above mentioned recommendations and return back for follow up visit. If you have any questions  I can be reached  at [phone]  or  [phone].  \par \par \par Galilea Car MD, [facility] \par Director, Pulmonary Hypertension Program \par [facility] \par Division of Pulmonary, Critical Care and Sleep Medicine \par  Professor of Medicine \par [employer]\par

## 2020-12-04 LAB
ALBUMIN SERPL ELPH-MCNC: 4.6 G/DL
ALP BLD-CCNC: 92 U/L
ALT SERPL-CCNC: 21 U/L
ANION GAP SERPL CALC-SCNC: 10 MMOL/L
AST SERPL-CCNC: 19 U/L
BILIRUB SERPL-MCNC: 0.6 MG/DL
BUN SERPL-MCNC: 16 MG/DL
CALCIUM SERPL-MCNC: 9.6 MG/DL
CHLORIDE SERPL-SCNC: 103 MMOL/L
CO2 SERPL-SCNC: 27 MMOL/L
CREAT SERPL-MCNC: 0.96 MG/DL
ESTIMATED AVERAGE GLUCOSE: 111 MG/DL
GLUCOSE SERPL-MCNC: 105 MG/DL
HBA1C MFR BLD HPLC: 5.5 %
POTASSIUM SERPL-SCNC: 4.1 MMOL/L
PROT SERPL-MCNC: 6.9 G/DL
SARS-COV-2 IGG SERPL IA-ACNC: 0.09 INDEX
SARS-COV-2 IGG SERPL QL IA: NEGATIVE
SODIUM SERPL-SCNC: 140 MMOL/L
TSH SERPL-ACNC: 2.3 UIU/ML

## 2020-12-28 ENCOUNTER — TRANSCRIPTION ENCOUNTER (OUTPATIENT)
Age: 85
End: 2020-12-28

## 2020-12-29 ENCOUNTER — TRANSCRIPTION ENCOUNTER (OUTPATIENT)
Age: 85
End: 2020-12-29

## 2021-01-06 ENCOUNTER — APPOINTMENT (OUTPATIENT)
Dept: UROLOGY | Facility: CLINIC | Age: 86
End: 2021-01-06
Payer: MEDICARE

## 2021-01-06 PROCEDURE — 99072 ADDL SUPL MATRL&STAF TM PHE: CPT

## 2021-01-06 PROCEDURE — 99213 OFFICE O/P EST LOW 20 MIN: CPT

## 2021-01-06 RX ORDER — SPIRONOLACTONE 25 MG/1
25 TABLET ORAL
Qty: 40 | Refills: 0 | Status: COMPLETED | COMMUNITY
Start: 2020-12-03 | End: 2021-01-06

## 2021-01-06 RX ORDER — AZITHROMYCIN 250 MG/1
250 TABLET, FILM COATED ORAL
Qty: 1 | Refills: 0 | Status: COMPLETED | COMMUNITY
Start: 2020-12-03 | End: 2021-01-06

## 2021-01-06 RX ORDER — AMLODIPINE BESYLATE 10 MG/1
10 TABLET ORAL
Refills: 0 | Status: COMPLETED | COMMUNITY
Start: 2017-07-24 | End: 2021-01-06

## 2021-01-06 RX ORDER — FLUTICASONE PROPIONATE 50 UG/1
50 SPRAY, METERED NASAL
Qty: 48 | Refills: 2 | Status: COMPLETED | COMMUNITY
Start: 2018-11-05 | End: 2021-01-06

## 2021-01-06 RX ORDER — RANITIDINE HYDROCHLORIDE 300 MG/1
300 TABLET, FILM COATED ORAL
Refills: 0 | Status: COMPLETED | COMMUNITY
End: 2021-01-06

## 2021-01-06 RX ORDER — SOLIFENACIN SUCCINATE 10 MG/1
10 TABLET ORAL
Qty: 90 | Refills: 3 | Status: COMPLETED | COMMUNITY
Start: 2019-09-11 | End: 2021-01-06

## 2021-01-06 NOTE — HISTORY OF PRESENT ILLNESS
[FreeTextEntry1] : Presents to the office today for follow up \par BPH: currently on tamsulosin and finasteride.\par Has concomitant overactive bladder symptoms.  Failed oxybutynin, trospium.  Last visit, started on Vesicare but does not feel that medication is effective.\par Currently on Myrbetriq, reports that some days the urgency is controlled. Also on Lasix which he feels sometimes contributes to his increased urgency \par Reports that urinary stream is okay.\par No hematuria, dysuria.\par No abdominal or flank pain.

## 2021-01-06 NOTE — ASSESSMENT
[FreeTextEntry1] : BPH: continue tamsulosin and finasteride.  \par Continue mirabegron 25 mg once daily.\par Follow up in 6 months.\par Recent PSA 0.57 ng/mL.

## 2021-01-07 ENCOUNTER — APPOINTMENT (OUTPATIENT)
Dept: CT IMAGING | Facility: CLINIC | Age: 86
End: 2021-01-07

## 2021-01-11 ENCOUNTER — OUTPATIENT (OUTPATIENT)
Dept: OUTPATIENT SERVICES | Facility: HOSPITAL | Age: 86
LOS: 1 days | End: 2021-01-11
Payer: COMMERCIAL

## 2021-01-11 ENCOUNTER — APPOINTMENT (OUTPATIENT)
Dept: CT IMAGING | Facility: CLINIC | Age: 86
End: 2021-01-11
Payer: MEDICARE

## 2021-01-11 DIAGNOSIS — H33.21 SEROUS RETINAL DETACHMENT, RIGHT EYE: Chronic | ICD-10-CM

## 2021-01-11 DIAGNOSIS — J44.9 CHRONIC OBSTRUCTIVE PULMONARY DISEASE, UNSPECIFIED: ICD-10-CM

## 2021-01-11 DIAGNOSIS — H26.9 UNSPECIFIED CATARACT: Chronic | ICD-10-CM

## 2021-01-11 DIAGNOSIS — Z96.643 PRESENCE OF ARTIFICIAL HIP JOINT, BILATERAL: Chronic | ICD-10-CM

## 2021-01-11 PROCEDURE — 71250 CT THORAX DX C-: CPT | Mod: 26

## 2021-01-11 PROCEDURE — 71250 CT THORAX DX C-: CPT

## 2021-01-13 ENCOUNTER — TRANSCRIPTION ENCOUNTER (OUTPATIENT)
Age: 86
End: 2021-01-13

## 2021-02-24 ENCOUNTER — RX RENEWAL (OUTPATIENT)
Age: 86
End: 2021-02-24

## 2021-03-19 NOTE — DIETITIAN INITIAL EVALUATION ADULT. - PROBLEM SELECTOR PLAN 1
03/19/21 0919   Events/Summary/Plan   Events/Summary/Plan SVN   Vital Signs   Pulse 72   Respiration 20   Pulse Oximetry 96 %   Respiratory Assessment   Level of Consciousness Alert   Chest Exam   Work Of Breathing / Effort Within Normal Limits   Breath Sounds   RUL Breath Sounds Clear   RML Breath Sounds Diminished   RLL Breath Sounds Diminished   BERNARDO Breath Sounds Clear   LLL Breath Sounds Diminished   Secretions   Cough Non Productive   Oxygen   O2 (LPM) 2   O2 Delivery Device Silicone Nasal Cannula     
CT A/P: IMPRESSION: Distal small bowel obstruction. Mild interloop edema/fluid.  -admit to [facility] with remote tele  -as per surgery - no surgical intervention at this time, conservative management  -s/p 1 L IVF - gentle IVF hydratation 50cc/hr  -continue IV pepcid 20mg BID  -zofran prn for nausea  -NPO except meds  -WBC of 13.81 - afebrile, unlikely infecitous, continue to trend  -Surgery, Dr. Simpson consulted by ED  -GI, Dr. Brady consulted

## 2021-04-12 ENCOUNTER — EMERGENCY (EMERGENCY)
Facility: HOSPITAL | Age: 86
LOS: 1 days | Discharge: HOME | End: 2021-04-12
Attending: EMERGENCY MEDICINE | Admitting: EMERGENCY MEDICINE
Payer: COMMERCIAL

## 2021-04-12 VITALS
TEMPERATURE: 98 F | RESPIRATION RATE: 18 BRPM | HEIGHT: 71 IN | DIASTOLIC BLOOD PRESSURE: 81 MMHG | WEIGHT: 212.08 LBS | SYSTOLIC BLOOD PRESSURE: 191 MMHG | OXYGEN SATURATION: 97 % | HEART RATE: 62 BPM

## 2021-04-12 VITALS
RESPIRATION RATE: 17 BRPM | OXYGEN SATURATION: 96 % | TEMPERATURE: 98 F | DIASTOLIC BLOOD PRESSURE: 75 MMHG | SYSTOLIC BLOOD PRESSURE: 169 MMHG | HEART RATE: 62 BPM

## 2021-04-12 DIAGNOSIS — Z96.643 PRESENCE OF ARTIFICIAL HIP JOINT, BILATERAL: Chronic | ICD-10-CM

## 2021-04-12 DIAGNOSIS — H33.21 SEROUS RETINAL DETACHMENT, RIGHT EYE: Chronic | ICD-10-CM

## 2021-04-12 DIAGNOSIS — H26.9 UNSPECIFIED CATARACT: Chronic | ICD-10-CM

## 2021-04-12 LAB
ALBUMIN SERPL ELPH-MCNC: 3.5 G/DL — SIGNIFICANT CHANGE UP (ref 3.3–5)
ALP SERPL-CCNC: 60 U/L — SIGNIFICANT CHANGE UP (ref 40–120)
ALT FLD-CCNC: 18 U/L — SIGNIFICANT CHANGE UP (ref 12–78)
ANION GAP SERPL CALC-SCNC: 7 MMOL/L — SIGNIFICANT CHANGE UP (ref 5–17)
AST SERPL-CCNC: 15 U/L — SIGNIFICANT CHANGE UP (ref 15–37)
BASOPHILS # BLD AUTO: 0.04 K/UL — SIGNIFICANT CHANGE UP (ref 0–0.2)
BASOPHILS NFR BLD AUTO: 0.5 % — SIGNIFICANT CHANGE UP (ref 0–2)
BILIRUB SERPL-MCNC: 0.6 MG/DL — SIGNIFICANT CHANGE UP (ref 0.2–1.2)
BUN SERPL-MCNC: 19 MG/DL — SIGNIFICANT CHANGE UP (ref 7–23)
CALCIUM SERPL-MCNC: 8.6 MG/DL — SIGNIFICANT CHANGE UP (ref 8.5–10.1)
CHLORIDE SERPL-SCNC: 109 MMOL/L — HIGH (ref 96–108)
CO2 SERPL-SCNC: 27 MMOL/L — SIGNIFICANT CHANGE UP (ref 22–31)
CREAT SERPL-MCNC: 1.1 MG/DL — SIGNIFICANT CHANGE UP (ref 0.5–1.3)
EOSINOPHIL # BLD AUTO: 0.21 K/UL — SIGNIFICANT CHANGE UP (ref 0–0.5)
EOSINOPHIL NFR BLD AUTO: 2.7 % — SIGNIFICANT CHANGE UP (ref 0–6)
GLUCOSE SERPL-MCNC: 107 MG/DL — HIGH (ref 70–99)
HCT VFR BLD CALC: 42.7 % — SIGNIFICANT CHANGE UP (ref 39–50)
HGB BLD-MCNC: 14.5 G/DL — SIGNIFICANT CHANGE UP (ref 13–17)
IMM GRANULOCYTES NFR BLD AUTO: 1.4 % — SIGNIFICANT CHANGE UP (ref 0–1.5)
LACTATE SERPL-SCNC: 1.2 MMOL/L — SIGNIFICANT CHANGE UP (ref 0.7–2)
LYMPHOCYTES # BLD AUTO: 1.08 K/UL — SIGNIFICANT CHANGE UP (ref 1–3.3)
LYMPHOCYTES # BLD AUTO: 13.7 % — SIGNIFICANT CHANGE UP (ref 13–44)
MCHC RBC-ENTMCNC: 30.5 PG — SIGNIFICANT CHANGE UP (ref 27–34)
MCHC RBC-ENTMCNC: 34 GM/DL — SIGNIFICANT CHANGE UP (ref 32–36)
MCV RBC AUTO: 89.7 FL — SIGNIFICANT CHANGE UP (ref 80–100)
MONOCYTES # BLD AUTO: 0.77 K/UL — SIGNIFICANT CHANGE UP (ref 0–0.9)
MONOCYTES NFR BLD AUTO: 9.8 % — SIGNIFICANT CHANGE UP (ref 2–14)
NEUTROPHILS # BLD AUTO: 5.65 K/UL — SIGNIFICANT CHANGE UP (ref 1.8–7.4)
NEUTROPHILS NFR BLD AUTO: 71.9 % — SIGNIFICANT CHANGE UP (ref 43–77)
NRBC # BLD: 0 /100 WBCS — SIGNIFICANT CHANGE UP (ref 0–0)
PLATELET # BLD AUTO: 187 K/UL — SIGNIFICANT CHANGE UP (ref 150–400)
POTASSIUM SERPL-MCNC: 3.6 MMOL/L — SIGNIFICANT CHANGE UP (ref 3.5–5.3)
POTASSIUM SERPL-SCNC: 3.6 MMOL/L — SIGNIFICANT CHANGE UP (ref 3.5–5.3)
PROT SERPL-MCNC: 6.9 G/DL — SIGNIFICANT CHANGE UP (ref 6–8.3)
RBC # BLD: 4.76 M/UL — SIGNIFICANT CHANGE UP (ref 4.2–5.8)
RBC # FLD: 13.7 % — SIGNIFICANT CHANGE UP (ref 10.3–14.5)
SARS-COV-2 RNA SPEC QL NAA+PROBE: SIGNIFICANT CHANGE UP
SODIUM SERPL-SCNC: 143 MMOL/L — SIGNIFICANT CHANGE UP (ref 135–145)
WBC # BLD: 7.86 K/UL — SIGNIFICANT CHANGE UP (ref 3.8–10.5)
WBC # FLD AUTO: 7.86 K/UL — SIGNIFICANT CHANGE UP (ref 3.8–10.5)

## 2021-04-12 PROCEDURE — 93971 EXTREMITY STUDY: CPT | Mod: 26,RT

## 2021-04-12 PROCEDURE — U0005: CPT

## 2021-04-12 PROCEDURE — 71045 X-RAY EXAM CHEST 1 VIEW: CPT

## 2021-04-12 PROCEDURE — 96375 TX/PRO/DX INJ NEW DRUG ADDON: CPT

## 2021-04-12 PROCEDURE — 80053 COMPREHEN METABOLIC PANEL: CPT

## 2021-04-12 PROCEDURE — 93005 ELECTROCARDIOGRAM TRACING: CPT

## 2021-04-12 PROCEDURE — 93971 EXTREMITY STUDY: CPT

## 2021-04-12 PROCEDURE — U0003: CPT

## 2021-04-12 PROCEDURE — 93010 ELECTROCARDIOGRAM REPORT: CPT

## 2021-04-12 PROCEDURE — 99284 EMERGENCY DEPT VISIT MOD MDM: CPT | Mod: 25

## 2021-04-12 PROCEDURE — 85025 COMPLETE CBC W/AUTO DIFF WBC: CPT

## 2021-04-12 PROCEDURE — 99285 EMERGENCY DEPT VISIT HI MDM: CPT

## 2021-04-12 PROCEDURE — 71045 X-RAY EXAM CHEST 1 VIEW: CPT | Mod: 26

## 2021-04-12 PROCEDURE — 36415 COLL VENOUS BLD VENIPUNCTURE: CPT

## 2021-04-12 PROCEDURE — 83605 ASSAY OF LACTIC ACID: CPT

## 2021-04-12 PROCEDURE — 96365 THER/PROPH/DIAG IV INF INIT: CPT

## 2021-04-12 PROCEDURE — 87040 BLOOD CULTURE FOR BACTERIA: CPT

## 2021-04-12 RX ORDER — FUROSEMIDE 40 MG
1 TABLET ORAL
Qty: 0 | Refills: 0 | DISCHARGE

## 2021-04-12 RX ORDER — LOSARTAN POTASSIUM 100 MG/1
1 TABLET, FILM COATED ORAL
Qty: 0 | Refills: 0 | DISCHARGE

## 2021-04-12 RX ORDER — AZTREONAM 2 G
1000 VIAL (EA) INJECTION ONCE
Refills: 0 | Status: COMPLETED | OUTPATIENT
Start: 2021-04-12 | End: 2021-04-12

## 2021-04-12 RX ORDER — VANCOMYCIN HCL 1 G
1000 VIAL (EA) INTRAVENOUS ONCE
Refills: 0 | Status: COMPLETED | OUTPATIENT
Start: 2021-04-12 | End: 2021-04-12

## 2021-04-12 RX ORDER — ASPIRIN/CALCIUM CARB/MAGNESIUM 324 MG
0 TABLET ORAL
Qty: 0 | Refills: 0 | DISCHARGE

## 2021-04-12 RX ORDER — FINASTERIDE 5 MG/1
1 TABLET, FILM COATED ORAL
Qty: 0 | Refills: 0 | DISCHARGE

## 2021-04-12 RX ORDER — AMLODIPINE BESYLATE 2.5 MG/1
1 TABLET ORAL
Qty: 0 | Refills: 0 | DISCHARGE

## 2021-04-12 RX ADMIN — Medication 250 MILLIGRAM(S): at 20:30

## 2021-04-12 RX ADMIN — Medication 1000 MILLIGRAM(S): at 20:30

## 2021-04-12 RX ADMIN — Medication 50 MILLIGRAM(S): at 19:08

## 2021-04-12 NOTE — ED ADULT NURSE NOTE - OBJECTIVE STATEMENT
Pt p/w redness and pain streaking up RLE x 1 week w/o n/v/d/f. Per patient started around R knee and moved upward causing pain. Sent by PMD for r/o DVT

## 2021-04-12 NOTE — ED ADULT NURSE NOTE - CHPI ED NUR SYMPTOMS NEG
no bleeding at site/no blood in mucus/no chills/no drainage/no fever/no purulent drainage/no rectal pain/no vomiting

## 2021-04-12 NOTE — ED PROVIDER NOTE - CARE PROVIDER_API CALL
Genna Nuñez (MD)  Vascular Surgery  [address] [place] [address]  Phone: [phone]  Fax: [phone]  Follow Up Time: 1-3 Days

## 2021-04-12 NOTE — ED PROVIDER NOTE - NSFOLLOWUPINSTRUCTIONS_ED_ALL_ED_FT
care note given for thrombophlebitis    follow up with vascular Dr Conner  call tomorrow to arrange follow up  recommend use of your compression stockings  avoid scratches from house cat  if your condition worsens return to ED

## 2021-04-12 NOTE — ED PROVIDER NOTE - OBJECTIVE STATEMENT
87 y male presents with right leg streaking cellulitis, extending from medial right knee to right medial thigh, states he first noticed itching in the area 1 week ago, denies fever, nvd,  states he has a cat that sits on his lap often, states "when he jumps , he sometimes scratches my legs",  states he was seen by Dr Santiago at urgent care today. advised come to ED for evaluation.  former smoker, no etoh.  PMD Dr Sharlene Martínez  PMH:  Anxiety    BPH (Benign Prostatic Hyperplasia)    CAD (coronary artery disease)    COPD (chronic obstructive pulmonary disease)  mild (no home oxygen)  Hyperlipemia    Hypertension    Pulmonary hypertension  moderate

## 2021-04-12 NOTE — ED ADULT NURSE NOTE - PSH
Bilateral cataracts  removed  Detached retina, right  with repair  H/O bilateral hip replacements

## 2021-04-12 NOTE — ED PROVIDER NOTE - PROGRESS NOTE DETAILS
results discussed, doppler superficial thrombophlebitis, advised follow up with vascular, given information for Dr Conner, advised follow up with his pmd, recommended use his compression stocking.  rx doxycycline and naproxen sent to pharmacy, adivsed if condition worsens return to ED

## 2021-04-12 NOTE — ED PROVIDER NOTE - CARE PLAN
Principal Discharge DX:	Cellulitis of right thigh   Principal Discharge DX:	Thrombophlebitis of superficial veins of right lower extremity

## 2021-04-12 NOTE — ED PROVIDER NOTE - PATIENT PORTAL LINK FT
You can access the FollowMyHealth Patient Portal offered by [facility] by registering at the following website: [email or web address]. By joining [employer]’s FollowMyHealth portal, you will also be able to view your health information using other applications (apps) compatible with our system.

## 2021-04-14 ENCOUNTER — RX RENEWAL (OUTPATIENT)
Age: 86
End: 2021-04-14

## 2021-04-18 LAB
CULTURE RESULTS: SIGNIFICANT CHANGE UP
CULTURE RESULTS: SIGNIFICANT CHANGE UP
SPECIMEN SOURCE: SIGNIFICANT CHANGE UP
SPECIMEN SOURCE: SIGNIFICANT CHANGE UP

## 2021-05-26 ENCOUNTER — TRANSCRIPTION ENCOUNTER (OUTPATIENT)
Age: 86
End: 2021-05-26

## 2021-06-28 ENCOUNTER — APPOINTMENT (OUTPATIENT)
Dept: UROLOGY | Facility: CLINIC | Age: 86
End: 2021-06-28
Payer: MEDICARE

## 2021-06-28 DIAGNOSIS — N43.3 HYDROCELE, UNSPECIFIED: ICD-10-CM

## 2021-06-28 PROCEDURE — 99213 OFFICE O/P EST LOW 20 MIN: CPT

## 2021-07-05 NOTE — PHYSICAL EXAM
[General Appearance - Well Developed] : well developed [General Appearance - Well Nourished] : well nourished [Normal Appearance] : normal appearance [Well Groomed] : well groomed [General Appearance - In No Acute Distress] : no acute distress [Abdomen Soft] : soft [Abdomen Tenderness] : non-tender [Costovertebral Angle Tenderness] : no ~M costovertebral angle tenderness [Urinary Bladder Findings] : the bladder was normal on palpation [Testes Tenderness] : no tenderness of the testes [FreeTextEntry1] : Bilateral epididymal cysts.  No erythema.  Minimal tenderness. [No Palpable Adenopathy] : no palpable adenopathy

## 2021-07-05 NOTE — ASSESSMENT
[FreeTextEntry1] : Scrotal ultrasound results reviewed.\par Recommend NSAIDs prn.\par No surgical intervention.\par BPH: continue finasteride and tamsulosin.

## 2021-07-05 NOTE — HISTORY OF PRESENT ILLNESS
[FreeTextEntry1] : Presents to the office today for recent scrotal ultrasound showing R complex epididymal cyst measuring 1.9 x 1.2 x 1.2 cm, L simple epididymal cyst measuring 0.8 x 0.5 x 0.7 cm, and bilateral hydroceles R>L.\par Endorses constant scrotal pain.\par BPH: on tamsulosin and finasteride, continues to have urgency.

## 2021-08-23 ENCOUNTER — RX RENEWAL (OUTPATIENT)
Age: 86
End: 2021-08-23

## 2021-08-27 ENCOUNTER — RX RENEWAL (OUTPATIENT)
Age: 86
End: 2021-08-27

## 2021-08-27 RX ORDER — FLUTICASONE PROPIONATE AND SALMETEROL 250; 50 UG/1; UG/1
250-50 POWDER RESPIRATORY (INHALATION)
Qty: 60 | Refills: 5 | Status: ACTIVE | COMMUNITY
Start: 2020-12-03 | End: 1900-01-01

## 2021-09-10 ENCOUNTER — RX RENEWAL (OUTPATIENT)
Age: 86
End: 2021-09-10

## 2021-09-10 ENCOUNTER — APPOINTMENT (OUTPATIENT)
Dept: PULMONOLOGY | Facility: CLINIC | Age: 86
End: 2021-09-10
Payer: MEDICARE

## 2021-09-10 ENCOUNTER — MED ADMIN CHARGE (OUTPATIENT)
Age: 86
End: 2021-09-10

## 2021-09-10 VITALS
SYSTOLIC BLOOD PRESSURE: 183 MMHG | RESPIRATION RATE: 15 BRPM | BODY MASS INDEX: 32.29 KG/M2 | HEIGHT: 69 IN | HEART RATE: 65 BPM | OXYGEN SATURATION: 93 % | DIASTOLIC BLOOD PRESSURE: 73 MMHG | WEIGHT: 218 LBS | TEMPERATURE: 97 F

## 2021-09-10 DIAGNOSIS — Z23 ENCOUNTER FOR IMMUNIZATION: ICD-10-CM

## 2021-09-10 DIAGNOSIS — R60.0 LOCALIZED EDEMA: ICD-10-CM

## 2021-09-10 PROCEDURE — 36415 COLL VENOUS BLD VENIPUNCTURE: CPT

## 2021-09-10 PROCEDURE — 90662 IIV NO PRSV INCREASED AG IM: CPT

## 2021-09-10 PROCEDURE — 99215 OFFICE O/P EST HI 40 MIN: CPT | Mod: 25

## 2021-09-10 PROCEDURE — G0008: CPT

## 2021-09-10 NOTE — HISTORY OF PRESENT ILLNESS
[TextBox_4] : -----This letter  is regarding your patient  who  attended pulmonary out patient office today.  I have reviewed  patient's  past history, social history, family history and medication list. I also  reviewed nurse practitioners/ and fellows  notes and assessment and agree with it.  –The patient was referred by ------\par \par 86 yr -year-old male history of past smoking, elevated PA S/P on echo-----History of coronary artery disease -S/P STENT PALCEMENT [ RCA  stent june 0217 ].--  --  \par \par .no history of , fever, chills , rigors, chestpain, or hemoptysis. Questionable history of Raynauds phenomenon. No h/o significant weight loss in last few months. No history of liver dysfunction , collagen vascular disorder or chronic thromboembolic disease. I would classify her dyspnea as WHO  FUNCTIONAL CLASS II--------\par \par -\par \par ECHO  --  --12/2016 severe LAE, moderate TREY EF=68%, est PASP= 48-53mmHg-------\par ---Echo  date-------JUN 2018------RVSP 45-50 mmhg, moderately elevated-----mild RA enlargement----mild to moderate AR, mild MR----\par ECHO 12/2018--- [ DR SANCHEZ]   -dilated LV LVEF 70 LVH with diastolic dysfunction moderate aortic regurgitation PS the 45, SEVER DILATED LA \par \par ---PSG ---JC  ON BIPAP 16/9\par \par ----Pft date---------7/2017--hyperinflation\par \par ----Ct scanchest 7/27/17 IMPRESSION:\par \par Mild  dilatation  of  the  main  pulmonary  artery,  which  can  be  seen  in  pulmonary\par arterial  hypertension.\par \par No  traction  bronchiectasis  or  honeycombing  to  suggest  pulmonary  fibrosis.\par \par Nonspecific  4  mm  nodule  in  the  left  lower  lobe.  Follow-up  in  one  year  with  a\par low-dose  noncontrast  CT  scan  of  the  chest  can  be  obtained  to  evaluate  for\par stability.\par \par 6  mm  nondependent  opacity  within  the  trachea  may  represent  adherent\par secretions;  attention  to  this  area  on  the  follow-up  exam  is  advised  to\par evaluate  for  resolution  and  to  exclude  an  endotracheal  lesion.\par \par \par ct 11/2018 \par IMPRESSION: \par 1. The 3 mm noncalcified nodule is unchanged since 7/27/2017. \par \par -----Cath date----------June 19 2017--stent proximal  RCA-\par \par ----SEPT 2017---------coming for follow up---------is getting whitish mucous------has been told by ENT [Dr. Gage LANDRY] that mucous is related to acid reflux-----------had a sleep study completed [AHI 75] t 90% 8.1-------\par \par sept 2019  \par JC  remains compliant with biPAP----and benefits from its use--\par  secondary pulm htn -recently started on lasix for edema,  follows cardiology DR SANCHEZ [[facility]]\par COPD_ using symbicort 160 bid- doing well from resp perspective \par \par ------JAN 2020------pt stable from pulmonary point of view-----remains compliant with BiPAP-----using symbicort and ventolin----\par \par dec 2020 -- pt complaining of increasing clear sputum production and SOB on exertion. Started mucinex in Sept 2020, noticed sputum has been more loose. Pt was recently hospitalized for bowel rest after gastric issues. Pt's wife recently passed in July 2020. Compliant with BiPAP at night. ---on  WIXELA MDU AND VENTOLIN PRN ---ADD FLONASE -- -COURSE OF Z PACK--SHORT COURSE OF PREDNISONE TO HELP COUGH---NEEDS REPEAT CT CHEST----CONDT CPAP \par \par cxr 4/2021 clear lungs\par \par 9/2021 copd- uses wixela and albuterol prn- cough w/clear sputum , RIVERA\par OSA_ non compliant to bipap\par He is up to date w/covid vac\par recent cardiac w/u with DR Sanchez\par Admitted 4/2021 for cellulitis

## 2021-09-10 NOTE — CONSULT LETTER
[Dear  ___] : Dear  [unfilled], [Courtesy Letter:] : I had the pleasure of seeing your patient, [unfilled], in my office today. [Sincerely,] : Sincerely, [DrKia  ___] : Dr. ORTIZ [Galilea Car MD, FCCP] : Galilea Car MD, FCCP [Director, Pulmonary Hypertension Program] : Director, Pulmonary Hypertension Program [[facility]] : [facility] [Division of Pulmonary, Critical Care and Sleep Medicine] : Division of Pulmonary, Critical Care and Sleep Medicine [Associate Professor of Medicine] : Associate Professor of Medicine

## 2021-09-10 NOTE — DISCUSSION/SUMMARY
[FreeTextEntry1] :  Assessment plan---------- patient has been referred here for further opinion regarding pulmonary problem-----------------86  -year-old male past history of smoking history of coronary disease [  RCA STENT JUNE 2017]-with secondary pulmonary hypertension\par \par 1 COPD --- start bretzri daily\par \par 2-SECONDARY --pulmonary hypertension----appear secondary to his underlying LV dysfunction--[ RCA  stent june 0217,  DIASTOLIC DYSFUNCTION, AORTIC VALVE DISEASE ---i don't think there is any role for pulmonary vasodilator treatment in this situation----I did bring up the issue of right heart catheterization to better define the etiology of pulmonary hypertension----patient at this time feels that he is exercise  tolerance is acceptable-----HE  HAS  AORTIC REGURGITATION AND IS  BEING MANAGED BY DR SANCHEZ---  keep euvolemic- on lasix.  add aldactone . ECHO with Dr. Sanchez-will get results \par \par 3--JC--advised to restart bipap\par  discussed  the recent [employer] voluntary recall for Continuous and Non-Continuous Ventilators (certain CPAP, BiLevel PAP and Ventilator Devices) due to two issues related to the polyester-based polyurethane (PE-PUR) sound abatement foam used in these devices.  The potential harm of inhalation or ingestion of the foam particles was discussed in detail with the patient.  Symptoms such as headache, upper airway irritation, cough, chest pressure and sinus infection were discussed at length with the patient.  \par : Given that the patient has moderate to severe sleep disordered breathing, the decision was made to continue therapy after a very thorough discussion of the risks and benefits of continued use until their repaired or fully replaced\par Patient was counseled to not use ozone-related cleaning products and adhere to their device Instructions for Use for approved cleaning methods.\par The dangers of drowsy driving were discussed with the patient.  The patient was warned to avoid drowsy driving. The patient will follow-up after he has heard from the DME company.\par \par \par 4) labs drawn in our office today\par 5) skin lesions ? from ASA/prednisone- stop steroids- referred to derm\par \par 6   CH COUGH--HYPEREACTIVITY AND POST NASAL DRIP-- mucinex\par \par 7 -- repeat chest CT yearly\par \par 8 --influenza vac given today\par 9- f/u 3-4 m\par \par \par -Thanks for allowing  me to participate  in the care of this patient.  Patient at this time  will follow  the above mentioned recommendations and return back for follow up visit. If you have any questions  I can be reached  at [phone]  or  [phone].  \par \par \par Galilea Car MD, FCCP \par Director, Pulmonary Hypertension Program \par [facility] \par Division of Pulmonary, Critical Care and Sleep Medicine \par  Professor of Medicine \par [employer]\par \par \par FERNANDO Moore-C\par

## 2021-09-13 LAB
ALBUMIN SERPL ELPH-MCNC: 4.2 G/DL
ALP BLD-CCNC: 60 U/L
ALT SERPL-CCNC: 15 U/L
ANION GAP SERPL CALC-SCNC: 12 MMOL/L
AST SERPL-CCNC: 16 U/L
BASOPHILS # BLD AUTO: 0.06 K/UL
BASOPHILS NFR BLD AUTO: 0.7 %
BILIRUB SERPL-MCNC: 0.6 MG/DL
BUN SERPL-MCNC: 17 MG/DL
CALCIUM SERPL-MCNC: 9.1 MG/DL
CHLORIDE SERPL-SCNC: 105 MMOL/L
CO2 SERPL-SCNC: 27 MMOL/L
COVID-19 SPIKE DOMAIN ANTIBODY INTERPRETATION: POSITIVE
CREAT SERPL-MCNC: 0.9 MG/DL
EOSINOPHIL # BLD AUTO: 0.13 K/UL
EOSINOPHIL NFR BLD AUTO: 1.4 %
GLUCOSE SERPL-MCNC: 124 MG/DL
HCT VFR BLD CALC: 45.1 %
HGB BLD-MCNC: 14.7 G/DL
IMM GRANULOCYTES NFR BLD AUTO: 1.4 %
LYMPHOCYTES # BLD AUTO: 1.39 K/UL
LYMPHOCYTES NFR BLD AUTO: 15.5 %
MAN DIFF?: NORMAL
MCHC RBC-ENTMCNC: 30.6 PG
MCHC RBC-ENTMCNC: 32.6 GM/DL
MCV RBC AUTO: 93.8 FL
MONOCYTES # BLD AUTO: 0.82 K/UL
MONOCYTES NFR BLD AUTO: 9.1 %
NEUTROPHILS # BLD AUTO: 6.44 K/UL
NEUTROPHILS NFR BLD AUTO: 71.9 %
NT-PROBNP SERPL-MCNC: 188 PG/ML
PLATELET # BLD AUTO: 211 K/UL
POTASSIUM SERPL-SCNC: 3.8 MMOL/L
PROT SERPL-MCNC: 6.3 G/DL
RBC # BLD: 4.81 M/UL
RBC # FLD: 13.4 %
SARS-COV-2 AB SERPL IA-ACNC: 82.1 U/ML
SODIUM SERPL-SCNC: 143 MMOL/L
WBC # FLD AUTO: 8.97 K/UL

## 2021-09-14 ENCOUNTER — TRANSCRIPTION ENCOUNTER (OUTPATIENT)
Age: 86
End: 2021-09-14

## 2021-09-14 RX ORDER — BUDESONIDE, GLYCOPYRROLATE, AND FORMOTEROL FUMARATE 160; 9; 4.8 UG/1; UG/1; UG/1
160-9-4.8 AEROSOL, METERED RESPIRATORY (INHALATION)
Qty: 1 | Refills: 2 | Status: DISCONTINUED | COMMUNITY
Start: 2021-09-10 | End: 2021-09-14

## 2021-09-21 ENCOUNTER — RX RENEWAL (OUTPATIENT)
Age: 86
End: 2021-09-21

## 2021-10-25 NOTE — ED ADULT TRIAGE NOTE - CHIEF COMPLAINT QUOTE
" I have nausea/ vomiting/ diarrhea, abdominal pain x 3 days since after I had a stress test and was given a dye " Spoke with Meme on 10/25/2021 and preauthorized , [id number], and [id number] effective today for 3 months 01/23/2022: Brett Rebollar #[id number] for all three CPT codes.

## 2021-12-07 ENCOUNTER — APPOINTMENT (OUTPATIENT)
Dept: UROLOGY | Facility: CLINIC | Age: 86
End: 2021-12-07
Payer: MEDICARE

## 2021-12-07 VITALS
RESPIRATION RATE: 15 BRPM | TEMPERATURE: 97 F | HEART RATE: 63 BPM | SYSTOLIC BLOOD PRESSURE: 135 MMHG | DIASTOLIC BLOOD PRESSURE: 75 MMHG | WEIGHT: 220 LBS | BODY MASS INDEX: 32.58 KG/M2 | HEIGHT: 69 IN

## 2021-12-07 DIAGNOSIS — N32.81 OVERACTIVE BLADDER: ICD-10-CM

## 2021-12-07 PROCEDURE — 99214 OFFICE O/P EST MOD 30 MIN: CPT

## 2021-12-07 NOTE — ASSESSMENT
[FreeTextEntry1] : BPH: continue on tamsulosin and finasteride.\par Patient interested in additional medication to control overactive symptoms.\par Will continue mirabegron 25 mg once daily and add oxybutynin Er 5 mg once daily.\par Goals of medication reviewed.  Discussed the potential adverse effects of the medication.  Discussed the proper administration of the medication.\par \par Follow up in three months.

## 2021-12-07 NOTE — HISTORY OF PRESENT ILLNESS
[FreeTextEntry1] : Here for 6 month follow up visit.\par BPH: currently on tamsulosin and finasteride.\par Has concomitant overactive bladder symptoms.  Failed oxybutynin, trospium, vesicare \par More recently started on mirabegron 25 mg once daily.  Mild improvement in symptoms.\par \par Previous scrotal ultrasound 6/17/2021: R complex epididymal cyst measuring 1.9 x 1.2 x 1.2 cm, L simple epididymal cyst measuring 0.8 x 0.5 x 0.7 cm, and bilateral hydroceles R>L.\par \par No hematuria, dysuria. Urine stream is okay.  \par No abdominal or flank pain.

## 2021-12-07 NOTE — PHYSICAL EXAM
[General Appearance - Well Developed] : well developed [General Appearance - Well Nourished] : well nourished [Normal Appearance] : normal appearance [Well Groomed] : well groomed [General Appearance - In No Acute Distress] : no acute distress [Abdomen Soft] : soft [Abdomen Tenderness] : non-tender [Costovertebral Angle Tenderness] : no ~M costovertebral angle tenderness [Urinary Bladder Findings] : the bladder was normal on palpation [Heart Rate And Rhythm] : Heart rate and rhythm were normal [] : no respiratory distress [Respiration, Rhythm And Depth] : normal respiratory rhythm and effort [Exaggerated Use Of Accessory Muscles For Inspiration] : no accessory muscle use

## 2021-12-16 ENCOUNTER — RX RENEWAL (OUTPATIENT)
Age: 86
End: 2021-12-16

## 2021-12-19 ENCOUNTER — RX RENEWAL (OUTPATIENT)
Age: 86
End: 2021-12-19

## 2022-01-01 NOTE — PHYSICAL EXAM
[General Appearance - Well Developed] : well developed [General Appearance - Well Nourished] : well nourished [Normal Appearance] : normal appearance [Well Groomed] : well groomed [General Appearance - In No Acute Distress] : no acute distress [Abdomen Soft] : soft [Abdomen Tenderness] : non-tender [Costovertebral Angle Tenderness] : no ~M costovertebral angle tenderness [Urinary Bladder Findings] : the bladder was normal on palpation PRINCIPAL DISCHARGE DIAGNOSIS  Diagnosis: Skull fracture  Assessment and Plan of Treatment:

## 2022-01-18 ENCOUNTER — RX RENEWAL (OUTPATIENT)
Age: 87
End: 2022-01-18

## 2022-01-19 ENCOUNTER — NON-APPOINTMENT (OUTPATIENT)
Age: 87
End: 2022-01-19

## 2022-01-19 NOTE — CONSULT LETTER
[Dear  ___] : Dear  [unfilled], [Courtesy Letter:] : I had the pleasure of seeing your patient, [unfilled], in my office today. [Sincerely,] : Sincerely, [DrKia  ___] : Dr. ORTIZ [Galilea Car MD, FCCP] : Galilea Car MD, FCCP [Director, Pulmonary Hypertension Program] : Director, Pulmonary Hypertension Program [[facility]] : [facility] [Division of Pulmonary, Critical Care and Sleep Medicine] : Division of Pulmonary, Critical Care and Sleep Medicine [Associate Professor of Medicine] : Associate Professor of Medicine

## 2022-01-20 ENCOUNTER — APPOINTMENT (OUTPATIENT)
Dept: PULMONOLOGY | Facility: CLINIC | Age: 87
End: 2022-01-20
Payer: MEDICARE

## 2022-01-20 VITALS
DIASTOLIC BLOOD PRESSURE: 81 MMHG | HEIGHT: 69 IN | WEIGHT: 230 LBS | SYSTOLIC BLOOD PRESSURE: 169 MMHG | RESPIRATION RATE: 16 BRPM | BODY MASS INDEX: 34.07 KG/M2 | HEART RATE: 63 BPM | TEMPERATURE: 98.1 F

## 2022-01-20 PROCEDURE — 99215 OFFICE O/P EST HI 40 MIN: CPT

## 2022-01-20 NOTE — HISTORY OF PRESENT ILLNESS
[TextBox_4] : -----This letter  is regarding your patient  who  attended pulmonary out patient office today.  I have reviewed  patient's  past history, social history, family history and medication list. I also  reviewed nurse practitioners/ and fellows  notes and assessment and agree with it.  –The patient was referred by ------\par \par 88 yr -year-old male history of past smoking, elevated PA S/P on echo-----History of coronary artery disease -S/P STENT PALCEMENT [ RCA  stent june 0217 ].--  --  \par \par .no history of , fever, chills , rigors, chestpain, or hemoptysis. Questionable history of Raynauds phenomenon. No h/o significant weight loss in last few months. No history of liver dysfunction , collagen vascular disorder or chronic thromboembolic disease. I would classify her dyspnea as WHO  FUNCTIONAL CLASS II--------\par \par -\par \par ECHO  --  --12/2016 severe LAE, moderate TREY EF=68%, est PASP= 48-53mmHg-------\par ---Echo  date-------JUN 2018------RVSP 45-50 mmhg, moderately elevated-----mild RA enlargement----mild to moderate AR, mild MR----\par ECHO 12/2018--- [ DR SANCHEZ]   -dilated LV LVEF 70 LVH with diastolic dysfunction moderate aortic regurgitation PS the 45, SEVER DILATED LA \par \par ---PSG ---JC  ON BIPAP 16/9\par \par ----Pft date---------7/2017--hyperinflation\par \par ----Ct scanchest 7/27/17 IMPRESSION:\par \par Mild  dilatation  of  the  main  pulmonary  artery,  which  can  be  seen  in  pulmonary\par arterial  hypertension.\par \par No  traction  bronchiectasis  or  honeycombing  to  suggest  pulmonary  fibrosis.\par \par Nonspecific  4  mm  nodule  in  the  left  lower  lobe.  Follow-up  in  one  year  with  a\par low-dose  noncontrast  CT  scan  of  the  chest  can  be  obtained  to  evaluate  for\par stability.\par \par 6  mm  nondependent  opacity  within  the  trachea  may  represent  adherent\par secretions;  attention  to  this  area  on  the  follow-up  exam  is  advised  to\par evaluate  for  resolution  and  to  exclude  an  endotracheal  lesion.\par \par \par ct 11/2018 \par IMPRESSION: \par 1. The 3 mm noncalcified nodule is unchanged since 7/27/2017. \par \par -----Cath date----------June 19 2017--stent proximal  RCA-\par \par \par cardiac   nm report 7/2021     Abnormal myocardial perfusion imaging, small area of apical wall infarct. No sign. ischemia. Normal LV size and function. Calculated EF is 58%. No significant changes as parents to prior 6/2020.\par \par ----SEPT 2017---------coming for follow up---------is getting whitish mucous------has been told by ENT [Dr. Gage LANDRY] that mucous is related to acid reflux-----------had a sleep study completed [AHI 75] t 90% 8.1-------\par \par sept 2019  \par JC  remains compliant with biPAP----and benefits from its use--\par  secondary pulm htn -recently started on lasix for edema,  follows cardiology DR SANCHEZ [[facility]]\par COPD_ using symbicort 160 bid- doing well from resp perspective \par \par ------JAN 2020------pt stable from pulmonary point of view-----remains compliant with BiPAP-----using symbicort and ventolin----\par \par dec 2020 -- pt complaining of increasing clear sputum production and SOB on exertion. Started mucinex in Sept 2020, noticed sputum has been more loose. Pt was recently hospitalized for bowel rest after gastric issues. Pt's wife recently passed in July 2020. Compliant with BiPAP at night. ---on  WIXELA MDU AND VENTOLIN PRN ---ADD FLONASE -- -COURSE OF Z PACK--SHORT COURSE OF PREDNISONE TO HELP COUGH---NEEDS REPEAT CT CHEST----CONDT CPAP \par \par cxr 4/2021 clear lungs\par \par 9/2021 copd- uses wixela and albuterol prn- cough w/clear sputum , RIVERA\par OSA_ non compliant to bipap\par He is up to date w/covid vac\par recent cardiac w/u with DR Sanchez\par Admitted 4/2021 for cellulitis\par \par jan 2022  copd- uon trelegy - cough w/clear sputum , RIVERA\par OSA_ -----compliant to bipap---uses azalestone\par He is up to date w/covid vac\par recent cardiac w/u with DR Sanchez--getting holter through her offcie------\par

## 2022-01-20 NOTE — DISCUSSION/SUMMARY
[FreeTextEntry1] :  Assessment plan---------- patient has been referred here for further opinion regarding pulmonary problem-----------------86  -year-old male past history of smoking history of coronary disease [  RCA STENT JUNE 2017]-with secondary pulmonary hypertension\par \par 1 COPD --- on Trelegy--he seems to be much improved with that\par \par 2-SECONDARY --pulmonary hypertension----appear secondary to his underlying LV dysfunction--[ RCA  stent june 0217,  DIASTOLIC DYSFUNCTION, AORTIC VALVE DISEASE ---i don't think there is any role for pulmonary vasodilator treatment in this situation----I did bring up the issue of right heart catheterization to better define the etiology of pulmonary hypertension----patient at this time feels that he is exercise  tolerance is acceptable-----HE  HAS  AORTIC REGURGITATION AND IS  BEING MANAGED BY DR SANCHEZ---  keep euvolemic- on lasix.  add aldactone . ECHO with Dr. Sanchez-will get results \par \par 3--JC--advised to restart bipap\par  discussed  the recent [employer] voluntary recall for Continuous and Non-Continuous Ventilators (certain CPAP, BiLevel PAP and Ventilator Devices) due to two issues related to the polyester-based polyurethane (PE-PUR) sound abatement foam used in these devices.  The potential harm of inhalation or ingestion of the foam particles was discussed in detail with the patient.  Symptoms such as headache, upper airway irritation, cough, chest pressure and sinus infection were discussed at length with the patient.  \par : Given that the patient has moderate to severe sleep disordered breathing, the decision was made to continue therapy after a very thorough discussion of the risks and benefits of continued use until their repaired or fully replaced\par Patient was counseled to not use ozone-related cleaning products and adhere to their device Instructions for Use for approved cleaning methods.\par The dangers of drowsy driving were discussed with the patient.  The patient was warned to avoid drowsy driving. The patient will follow-up after he has heard from the DME company.\par \par \par 4) labs drawn in our office today\par 5) skin lesions ? from ASA/prednisone- stop steroids- referred to derm\par \par 6   CH COUGH--HYPEREACTIVITY AND POST NASAL DRIP-- mucinex\par \par 7 -- repeat chest CT yearly\par \par 8 --influenza vac given today\par 9- f/u 3-4 m\par \par \par -Thanks for allowing  me to participate  in the care of this patient.  Patient at this time  will follow  the above mentioned recommendations and return back for follow up visit. If you have any questions  I can be reached  at [phone]  or  [phone].  \par \par \par Galilea Car MD, FCCP \par Director, Pulmonary Hypertension Program \par [facility] \par Division of Pulmonary, Critical Care and Sleep Medicine \par  Professor of Medicine \par [employer]\par \par \par FERNANDO Moore-C\par

## 2022-01-21 LAB
ALBUMIN SERPL ELPH-MCNC: 4.2 G/DL
ALP BLD-CCNC: 68 U/L
ALT SERPL-CCNC: 14 U/L
ANION GAP SERPL CALC-SCNC: 15 MMOL/L
AST SERPL-CCNC: 20 U/L
BASOPHILS # BLD AUTO: 0.08 K/UL
BASOPHILS NFR BLD AUTO: 0.9 %
BILIRUB SERPL-MCNC: 0.7 MG/DL
BUN SERPL-MCNC: 17 MG/DL
CALCIUM SERPL-MCNC: 8.9 MG/DL
CHLORIDE SERPL-SCNC: 105 MMOL/L
CO2 SERPL-SCNC: 22 MMOL/L
CREAT SERPL-MCNC: 0.99 MG/DL
EOSINOPHIL # BLD AUTO: 0.23 K/UL
EOSINOPHIL NFR BLD AUTO: 2.5 %
GLUCOSE SERPL-MCNC: 143 MG/DL
HCT VFR BLD CALC: 44.8 %
HGB BLD-MCNC: 14.7 G/DL
IMM GRANULOCYTES NFR BLD AUTO: 0.9 %
LYMPHOCYTES # BLD AUTO: 1.17 K/UL
LYMPHOCYTES NFR BLD AUTO: 12.8 %
MAN DIFF?: NORMAL
MCHC RBC-ENTMCNC: 29.7 PG
MCHC RBC-ENTMCNC: 32.8 GM/DL
MCV RBC AUTO: 90.5 FL
MONOCYTES # BLD AUTO: 0.8 K/UL
MONOCYTES NFR BLD AUTO: 8.7 %
NEUTROPHILS # BLD AUTO: 6.8 K/UL
NEUTROPHILS NFR BLD AUTO: 74.2 %
PLATELET # BLD AUTO: 202 K/UL
POTASSIUM SERPL-SCNC: 3.8 MMOL/L
PROT SERPL-MCNC: 6.4 G/DL
RBC # BLD: 4.95 M/UL
RBC # FLD: 13.3 %
SODIUM SERPL-SCNC: 143 MMOL/L
WBC # FLD AUTO: 9.16 K/UL

## 2022-02-16 ENCOUNTER — RX RENEWAL (OUTPATIENT)
Age: 87
End: 2022-02-16

## 2022-03-07 ENCOUNTER — TRANSCRIPTION ENCOUNTER (OUTPATIENT)
Age: 87
End: 2022-03-07

## 2022-03-09 ENCOUNTER — RX RENEWAL (OUTPATIENT)
Age: 87
End: 2022-03-09

## 2022-04-07 NOTE — ED ADULT NURSE NOTE - OBJECTIVE STATEMENT
Message to physician:     Date of last visit: 3/14/2022    Date of next visit if scheduled: none    Potassium   Date Value Ref Range Status   03/14/2022 3.9 3.5 - 5.0 mmol/L Final   11/16/2020 4.5 3.4 - 5.3 mmol/L Final     Creatinine   Date Value Ref Range Status   03/14/2022 0.69 0.60 - 1.10 mg/dL Final   11/16/2020 0.5 (L) 0.6 - 1.3 mg/dL Final     GFR Estimate   Date Value Ref Range Status   03/14/2022 >90 >60 mL/min/1.73m2 Final     Comment:     Effective December 21, 2021 eGFRcr in adults is calculated using the 2021 CKD-EPI creatinine equation which includes age and gender (Devin et al., NEJ, DOI: 10.1056/DSHSnq0574934)   04/06/2021 >60 >60 mL/min/1.73m2 Final   08/29/2014 90 >60 mL/min/1.7m2 Final     Comment:     Non  GFR Calc       BP Readings from Last 3 Encounters:   03/14/22 118/77   03/08/22 (!) 137/90   02/14/22 131/87       Hemoglobin A1C POCT   Date Value Ref Range Status   11/16/2020 5.9 (H) 4.1 - 5.7 % Final     Hemoglobin A1C   Date Value Ref Range Status   11/02/2021 6.4 (H) 0.0 - 5.6 % Final     Comment:     Normal <5.7%   Prediabetes 5.7-6.4%    Diabetes 6.5% or higher     Note: Adopted from ADA consensus guidelines.       Please complete refill and CLOSE ENCOUNTER.  Closing the encounter signifies the refill is complete.    
pt c/o nausea/ vomiting/ diarrhea, with epigastric abdominal pain x 3 days since after having stress test with IV dye. pt denies cp or sob. no fevers or chills, pt had negative covid test on Tuesday.

## 2022-04-10 ENCOUNTER — NON-APPOINTMENT (OUTPATIENT)
Age: 87
End: 2022-04-10

## 2022-04-11 ENCOUNTER — TRANSCRIPTION ENCOUNTER (OUTPATIENT)
Age: 87
End: 2022-04-11

## 2022-04-19 ENCOUNTER — TRANSCRIPTION ENCOUNTER (OUTPATIENT)
Age: 87
End: 2022-04-19

## 2022-05-26 ENCOUNTER — APPOINTMENT (OUTPATIENT)
Dept: UROLOGY | Facility: CLINIC | Age: 87
End: 2022-05-26
Payer: MEDICARE

## 2022-05-26 VITALS
HEIGHT: 69 IN | RESPIRATION RATE: 17 BRPM | WEIGHT: 230 LBS | DIASTOLIC BLOOD PRESSURE: 76 MMHG | HEART RATE: 55 BPM | SYSTOLIC BLOOD PRESSURE: 173 MMHG | BODY MASS INDEX: 34.07 KG/M2 | TEMPERATURE: 98.2 F

## 2022-05-26 PROCEDURE — 99213 OFFICE O/P EST LOW 20 MIN: CPT

## 2022-05-26 NOTE — HISTORY OF PRESENT ILLNESS
[FreeTextEntry1] : Here for 6 months.\par BPH:  remains on finasteride 5 mg and tamsulosin 0.4 mg once daily.\par LUTS: remains on mirabegron 25 mg.  At the last visit, started on oxybutynin ER 5 once daily.\par Overall feels improvement but still has days with significant urinary frequency. Less incontinence episodes.  No significant side effects from the medication.\par No other changes in health other than more common bruising.  Recently decreased aspirin from 325 mg to 81 mg.

## 2022-05-26 NOTE — ASSESSMENT
[FreeTextEntry1] : BPH: continue both medications.\par LUTS:  recommend to change timing of administration. Mirabegron 25 mg in the morning.  Oxybutynin ER 5 mg in evening. \par \par Follow up in 6 months.

## 2022-06-10 ENCOUNTER — TRANSCRIPTION ENCOUNTER (OUTPATIENT)
Age: 87
End: 2022-06-10

## 2022-08-08 NOTE — PROGRESS NOTE ADULT - PROBLEM SELECTOR PROBLEM 4
COVID-19 PCR test completed. Patient handout For Patients Who Have Been Tested for Covid-19 (Coronavirus) was given to the patient, which includes test result notification process.    
Hypertension

## 2022-09-12 ENCOUNTER — TRANSCRIPTION ENCOUNTER (OUTPATIENT)
Age: 87
End: 2022-09-12

## 2022-09-19 ENCOUNTER — APPOINTMENT (OUTPATIENT)
Dept: PULMONOLOGY | Facility: CLINIC | Age: 87
End: 2022-09-19

## 2022-09-19 ENCOUNTER — TRANSCRIPTION ENCOUNTER (OUTPATIENT)
Age: 87
End: 2022-09-19

## 2022-09-19 VITALS
OXYGEN SATURATION: 92 % | RESPIRATION RATE: 16 BRPM | WEIGHT: 220 LBS | HEIGHT: 69 IN | HEART RATE: 72 BPM | DIASTOLIC BLOOD PRESSURE: 69 MMHG | TEMPERATURE: 98 F | SYSTOLIC BLOOD PRESSURE: 139 MMHG | BODY MASS INDEX: 32.58 KG/M2

## 2022-09-19 DIAGNOSIS — R06.00 DYSPNEA, UNSPECIFIED: ICD-10-CM

## 2022-09-19 DIAGNOSIS — G47.33 OBSTRUCTIVE SLEEP APNEA (ADULT) (PEDIATRIC): ICD-10-CM

## 2022-09-19 PROCEDURE — 36415 COLL VENOUS BLD VENIPUNCTURE: CPT

## 2022-09-19 PROCEDURE — 99215 OFFICE O/P EST HI 40 MIN: CPT | Mod: 25

## 2022-09-19 NOTE — END OF VISIT
[Time Spent: ___ minutes] : I have spent [unfilled] minutes of time on the encounter. [] : Nurse Practitioner [FreeTextEntry1] : at

## 2022-09-19 NOTE — HISTORY OF PRESENT ILLNESS
[TextBox_4] : -----This letter  is regarding your patient  who  attended pulmonary out patient office today.  I have reviewed  patient's  past history, social history, family history and medication list. I also  reviewed nurse practitioners/ and fellows  notes and assessment and agree with it.  –The patient was referred by ------\par \par 89 yr -year-old male history of past smoking, elevated PA S/P on echo-----History of coronary artery disease -S/P STENT PALCEMENT [ RCA  stent june 0217 ].--  --  \par \par .no history of , fever, chills , rigors, chestpain, or hemoptysis. Questionable history of Raynauds phenomenon. No h/o significant weight loss in last few months. No history of liver dysfunction , collagen vascular disorder or chronic thromboembolic disease. I would classify her dyspnea as WHO  FUNCTIONAL CLASS II--------\par \par -\par \par ECHO  --  --12/2016 severe LAE, moderate TREY EF=68%, est PASP= 48-53mmHg-------\par ---Echo  date-------JUN 2018------RVSP 45-50 mmhg, moderately elevated-----mild RA enlargement----mild to moderate AR, mild MR----\par ECHO 12/2018--- [ DR SANCHEZ]   -dilated LV LVEF 70 LVH with diastolic dysfunction moderate aortic regurgitation PS the 45, SEVER DILATED LA \par \par ---PSG ---JC  ON BIPAP 16/9\par \par ----Pft date---------7/2017--hyperinflation\par \par ----Ct scanchest 7/27/17 IMPRESSION:\par \par Mild  dilatation  of  the  main  pulmonary  artery,  which  can  be  seen  in  pulmonary\par arterial  hypertension.\par \par No  traction  bronchiectasis  or  honeycombing  to  suggest  pulmonary  fibrosis.\par \par Nonspecific  4  mm  nodule  in  the  left  lower  lobe.  Follow-up  in  one  year  with  a\par low-dose  noncontrast  CT  scan  of  the  chest  can  be  obtained  to  evaluate  for\par stability.\par \par 6  mm  nondependent  opacity  within  the  trachea  may  represent  adherent\par secretions;  attention  to  this  area  on  the  follow-up  exam  is  advised  to\par evaluate  for  resolution  and  to  exclude  an  endotracheal  lesion.\par \par \par ct 11/2018 \par IMPRESSION: \par 1. The 3 mm noncalcified nodule is unchanged since 7/27/2017. \par \par -----Cath date----------June 19 2017--stent proximal  RCA-\par \par \par cardiac   nm report 7/2021     Abnormal myocardial perfusion imaging, small area of apical wall infarct. No sign. ischemia. Normal LV size and function. Calculated EF is 58%. No significant changes as parents to prior 6/2020.\par \par ----SEPT 2017---------coming for follow up---------is getting whitish mucous------has been told by ENT [Dr. Gage LANDRY] that mucous is related to acid reflux-----------had a sleep study completed [AHI 75] t 90% 8.1-------\par \par sept 2019  \par JC  remains compliant with biPAP----and benefits from its use--\par  secondary pulm htn -recently started on lasix for edema,  follows cardiology DR SANCHEZ [[facility]]\par COPD_ using symbicort 160 bid- doing well from resp perspective \par \par ------JAN 2020------pt stable from pulmonary point of view-----remains compliant with BiPAP-----using symbicort and ventolin----\par \par dec 2020 -- pt complaining of increasing clear sputum production and SOB on exertion. Started mucinex in Sept 2020, noticed sputum has been more loose. Pt was recently hospitalized for bowel rest after gastric issues. Pt's wife recently passed in July 2020. Compliant with BiPAP at night. ---on  WIXELA MDU AND VENTOLIN PRN ---ADD FLONASE -- -COURSE OF Z PACK--SHORT COURSE OF PREDNISONE TO HELP COUGH---NEEDS REPEAT CT CHEST----CONDT CPAP \par \par cxr 4/2021 clear lungs\par \par 9/2021 copd- uses wixela and albuterol prn- cough w/clear sputum , RIVERA\par OSA_ non compliant to bipap\par He is up to date w/covid vac\par recent cardiac w/u with DR Sanchez\par Admitted 4/2021 for cellulitis\par \par jan 2022  copd- uon trelegy - cough w/clear sputum , RIVERA\par OSA_ -----compliant to bipap---uses azalestone\par He is up to date w/covid vac\par recent cardiac w/u with DR Sanchez--getting holter through her offcie------\par \par \par 9/2022 copd- doing well on trelegy & ventolin- no resp complaints\par Having pedal edema- on diuretics-follows urology. cardiac w/u with DR Sanchez- Getting vein ablation of lower extremities wt vascular\par OSA_ -----compliant to bipap---benefits from its nightly use\par \par

## 2022-09-19 NOTE — DISCUSSION/SUMMARY
[FreeTextEntry1] :  Assessment plan---------- patient has been referred here for further opinion regarding pulmonary problem----------------89  -year-old male past history of smoking history of coronary disease [  RCA STENT JUNE 2017]-with secondary pulmonary hypertension\par \par 1 COPD --- on Trelegy-and ventolin as needed-he seems to be much improved with that\par \par 2-SECONDARY --pulmonary hypertension----appear secondary to his underlying LV dysfunction--[ RCA  stent june 0217,  DIASTOLIC DYSFUNCTION, AORTIC VALVE DISEASE ---i don't think there is any role for pulmonary vasodilator treatment in this situation----I did bring up the issue of right heart catheterization to better define the etiology of pulmonary hypertension----patient at this time feels that he is exercise  tolerance is acceptable-----HE  HAS  AORTIC REGURGITATION AND IS  BEING MANAGED BY DR SANCHEZ---  keep euvolemic- on lasix.  add aldactone . ECHO with Dr. Sanchez-will get results \par \par 3--JC--on  bipap and benefits from nightly use\par \par \par 4) labs drawn in our office today\par 5) get ct chest and pft\par 6) f/u in 6 m\par \par \par -Thanks for allowing  me to participate  in the care of this patient.  Patient at this time  will follow  the above mentioned recommendations and return back for follow up visit. If you have any questions  I can be reached  at [phone]  or  [phone].  \par \par \par Galilea Car MD, FCCP \par Director, Pulmonary Hypertension Program \par [facility] \par Division of Pulmonary, Critical Care and Sleep Medicine \par  Professor of Medicine \par [facility] [employer]\par \par \par FERNANDO Moore-C\par

## 2022-09-21 LAB
ALBUMIN SERPL ELPH-MCNC: 4.5 G/DL
ALP BLD-CCNC: 67 U/L
ALT SERPL-CCNC: 15 U/L
ANION GAP SERPL CALC-SCNC: 8 MMOL/L
AST SERPL-CCNC: 14 U/L
BASOPHILS # BLD AUTO: 0.08 K/UL
BASOPHILS NFR BLD AUTO: 0.9 %
BILIRUB SERPL-MCNC: 0.6 MG/DL
BUN SERPL-MCNC: 16 MG/DL
CALCIUM SERPL-MCNC: 9.4 MG/DL
CHLORIDE SERPL-SCNC: 104 MMOL/L
CO2 SERPL-SCNC: 29 MMOL/L
COVID-19 NUCLEOCAPSID  GAM ANTIBODY INTERPRETATION: NEGATIVE
COVID-19 SPIKE DOMAIN ANTIBODY INTERPRETATION: POSITIVE
CREAT SERPL-MCNC: 1.02 MG/DL
EGFR: 70 ML/MIN/1.73M2
EOSINOPHIL # BLD AUTO: 0.21 K/UL
EOSINOPHIL NFR BLD AUTO: 2.3 %
GLUCOSE SERPL-MCNC: 99 MG/DL
HCT VFR BLD CALC: 45.1 %
HGB BLD-MCNC: 15.3 G/DL
IMM GRANULOCYTES NFR BLD AUTO: 1.1 %
LYMPHOCYTES # BLD AUTO: 1.4 K/UL
LYMPHOCYTES NFR BLD AUTO: 15.5 %
MAN DIFF?: NORMAL
MCHC RBC-ENTMCNC: 31 PG
MCHC RBC-ENTMCNC: 33.9 GM/DL
MCV RBC AUTO: 91.5 FL
MONOCYTES # BLD AUTO: 0.86 K/UL
MONOCYTES NFR BLD AUTO: 9.5 %
NEUTROPHILS # BLD AUTO: 6.38 K/UL
NEUTROPHILS NFR BLD AUTO: 70.7 %
NT-PROBNP SERPL-MCNC: 164 PG/ML
PLATELET # BLD AUTO: 214 K/UL
POTASSIUM SERPL-SCNC: 4.1 MMOL/L
PROT SERPL-MCNC: 6.3 G/DL
RBC # BLD: 4.93 M/UL
RBC # FLD: 13.8 %
SARS-COV-2 AB SERPL IA-ACNC: >250 U/ML
SARS-COV-2 AB SERPL QL IA: 0.1 INDEX
SODIUM SERPL-SCNC: 141 MMOL/L
WBC # FLD AUTO: 9.03 K/UL

## 2022-09-22 ENCOUNTER — TRANSCRIPTION ENCOUNTER (OUTPATIENT)
Age: 87
End: 2022-09-22

## 2022-09-26 ENCOUNTER — TRANSCRIPTION ENCOUNTER (OUTPATIENT)
Age: 87
End: 2022-09-26

## 2022-09-26 RX ORDER — ALBUTEROL SULFATE 90 UG/1
108 (90 BASE) INHALANT RESPIRATORY (INHALATION)
Qty: 3 | Refills: 2 | Status: DISCONTINUED | COMMUNITY
Start: 2022-09-22 | End: 2022-09-26

## 2022-11-01 ENCOUNTER — OUTPATIENT (OUTPATIENT)
Dept: OUTPATIENT SERVICES | Facility: HOSPITAL | Age: 87
LOS: 1 days | End: 2022-11-01
Payer: COMMERCIAL

## 2022-11-01 ENCOUNTER — APPOINTMENT (OUTPATIENT)
Dept: RADIOLOGY | Facility: CLINIC | Age: 87
End: 2022-11-01

## 2022-11-01 DIAGNOSIS — H33.21 SEROUS RETINAL DETACHMENT, RIGHT EYE: Chronic | ICD-10-CM

## 2022-11-01 DIAGNOSIS — J44.9 CHRONIC OBSTRUCTIVE PULMONARY DISEASE, UNSPECIFIED: ICD-10-CM

## 2022-11-01 DIAGNOSIS — Z96.643 PRESENCE OF ARTIFICIAL HIP JOINT, BILATERAL: Chronic | ICD-10-CM

## 2022-11-01 DIAGNOSIS — H26.9 UNSPECIFIED CATARACT: Chronic | ICD-10-CM

## 2022-11-01 PROCEDURE — 71046 X-RAY EXAM CHEST 2 VIEWS: CPT

## 2022-11-01 PROCEDURE — 71046 X-RAY EXAM CHEST 2 VIEWS: CPT | Mod: 26

## 2022-11-02 ENCOUNTER — TRANSCRIPTION ENCOUNTER (OUTPATIENT)
Age: 87
End: 2022-11-02

## 2022-11-12 ENCOUNTER — RX RENEWAL (OUTPATIENT)
Age: 87
End: 2022-11-12

## 2022-11-17 ENCOUNTER — APPOINTMENT (OUTPATIENT)
Dept: UROLOGY | Facility: CLINIC | Age: 87
End: 2022-11-17

## 2022-11-17 VITALS
BODY MASS INDEX: 32.29 KG/M2 | SYSTOLIC BLOOD PRESSURE: 130 MMHG | WEIGHT: 218 LBS | RESPIRATION RATE: 16 BRPM | HEART RATE: 76 BPM | HEIGHT: 69 IN | DIASTOLIC BLOOD PRESSURE: 69 MMHG

## 2022-11-17 DIAGNOSIS — Z85.828 PERSONAL HISTORY OF OTHER MALIGNANT NEOPLASM OF SKIN: ICD-10-CM

## 2022-11-17 PROCEDURE — 99213 OFFICE O/P EST LOW 20 MIN: CPT

## 2022-11-17 RX ORDER — NIFEDIPINE 60 MG/1
60 TABLET, FILM COATED, EXTENDED RELEASE ORAL
Qty: 30 | Refills: 0 | Status: ACTIVE | COMMUNITY
Start: 2022-06-02

## 2022-11-17 RX ORDER — PREDNISONE 5 MG/1
5 TABLET ORAL DAILY
Qty: 30 | Refills: 5 | Status: COMPLETED | COMMUNITY
Start: 2020-12-03 | End: 2022-11-17

## 2022-11-17 RX ORDER — POTASSIUM CHLORIDE 1500 MG/1
20 TABLET, FILM COATED, EXTENDED RELEASE ORAL
Refills: 0 | Status: COMPLETED | COMMUNITY
End: 2022-11-17

## 2022-11-17 RX ORDER — GUAIFENESIN 600 MG/1
600 TABLET, EXTENDED RELEASE ORAL
Qty: 60 | Refills: 2 | Status: COMPLETED | COMMUNITY
Start: 2021-09-10 | End: 2022-11-17

## 2022-11-17 RX ORDER — FAMOTIDINE 20 MG/1
20 TABLET, FILM COATED ORAL
Qty: 180 | Refills: 0 | Status: ACTIVE | COMMUNITY
Start: 2022-03-11

## 2022-11-17 RX ORDER — AZELASTINE HYDROCHLORIDE 205.5 UG/1
0.15 SPRAY, METERED NASAL
Qty: 1 | Refills: 2 | Status: COMPLETED | COMMUNITY
Start: 2022-01-20 | End: 2022-11-17

## 2022-11-17 RX ORDER — HYDROCORTISONE 1 %
12 CREAM (GRAM) TOPICAL
Qty: 400 | Refills: 0 | Status: ACTIVE | COMMUNITY
Start: 2022-10-06

## 2022-11-17 RX ORDER — SPIRONOLACTONE 25 MG/1
25 TABLET ORAL
Qty: 40 | Refills: 0 | Status: COMPLETED | COMMUNITY
Start: 2021-09-10 | End: 2022-11-17

## 2022-11-17 NOTE — PHYSICAL EXAM
[General Appearance - Well Developed] : well developed [Normal Appearance] : normal appearance [Abdomen Soft] : soft [Abdomen Tenderness] : non-tender [Urinary Bladder Findings] : the bladder was normal on palpation [Costovertebral Angle Tenderness] : no ~M costovertebral angle tenderness

## 2022-11-17 NOTE — ASSESSMENT
[FreeTextEntry1] : Doing well.\par Urge incontinence improved by switching the timing of medication.\par Continue same meds.\par Follow up in 6 months.

## 2022-11-17 NOTE — HISTORY OF PRESENT ILLNESS
[FreeTextEntry1] : Patient is a 89 year old man comes in today for BPH/LUTS \par BPH:  remains on finasteride 5 mg and tamsulosin 0.4 mg once daily.\par LUTS: remains on mirabegron 25 mg and oxybutynin ER 5 once daily.\par \par Has been using mirabegron the morning and Oxybutynin as suggested at last visit.Which he states helped. \par Then he stopped Prednisone 3-4 weeks ago and felt urgency and leakage increased\par He reports that urgency and leakage is slowly improving \par \par Denies gross hematuria, denies abdominal pain.

## 2022-12-10 NOTE — ED POST DISCHARGE NOTE - RESULT SUMMARY
pt reports improvement in symptoms advised importance of follow up with vacular requests additional vascular surgeon to follow up with Dr. Donell contreras Constitutional: See HPI.  Skin: No skin rash.  Except as documented in the HPI, all other systems are negative.

## 2022-12-12 ENCOUNTER — TRANSCRIPTION ENCOUNTER (OUTPATIENT)
Age: 87
End: 2022-12-12

## 2023-01-27 ENCOUNTER — NON-APPOINTMENT (OUTPATIENT)
Age: 88
End: 2023-01-27

## 2023-01-27 ENCOUNTER — APPOINTMENT (OUTPATIENT)
Dept: OPHTHALMOLOGY | Facility: CLINIC | Age: 88
End: 2023-01-27
Payer: MEDICARE

## 2023-01-27 PROCEDURE — 99204 OFFICE O/P NEW MOD 45 MIN: CPT

## 2023-01-27 PROCEDURE — 92015 DETERMINE REFRACTIVE STATE: CPT

## 2023-01-27 PROCEDURE — 92060 SENSORIMOTOR EXAMINATION: CPT

## 2023-03-05 NOTE — CONSULT LETTER
[Dear  ___] : Dear  [unfilled], [Courtesy Letter:] : I had the pleasure of seeing your patient, [unfilled], in my office today. [Sincerely,] : Sincerely, [DrKia  ___] : Dr. ORTIZ [Galilea Car MD, FCCP] : Galilea Car MD, FCCP [Director, Pulmonary Hypertension Program] : Director, Pulmonary Hypertension Program [[facility]] : [facility] [Division of Pulmonary, Critical Care and Sleep Medicine] : Division of Pulmonary, Critical Care and Sleep Medicine [Associate Professor of Medicine] : Associate Professor of Medicine

## 2023-03-07 ENCOUNTER — APPOINTMENT (OUTPATIENT)
Dept: PULMONOLOGY | Facility: CLINIC | Age: 88
End: 2023-03-07
Payer: MEDICARE

## 2023-03-07 VITALS
HEIGHT: 67 IN | DIASTOLIC BLOOD PRESSURE: 76 MMHG | SYSTOLIC BLOOD PRESSURE: 155 MMHG | HEART RATE: 73 BPM | WEIGHT: 225 LBS | OXYGEN SATURATION: 95 % | BODY MASS INDEX: 35.31 KG/M2

## 2023-03-07 DIAGNOSIS — I25.10 ATHEROSCLEROTIC HEART DISEASE OF NATIVE CORONARY ARTERY W/OUT ANGINA PECTORIS: ICD-10-CM

## 2023-03-07 PROCEDURE — 99215 OFFICE O/P EST HI 40 MIN: CPT | Mod: 25

## 2023-03-07 PROCEDURE — ZZZZZ: CPT

## 2023-03-07 PROCEDURE — 94726 PLETHYSMOGRAPHY LUNG VOLUMES: CPT

## 2023-03-07 PROCEDURE — 94010 BREATHING CAPACITY TEST: CPT

## 2023-03-07 PROCEDURE — 94729 DIFFUSING CAPACITY: CPT

## 2023-03-07 RX ORDER — PREDNISONE 5 MG/1
5 TABLET ORAL
Qty: 30 | Refills: 5 | Status: ACTIVE | COMMUNITY
Start: 2022-12-02 | End: 1900-01-01

## 2023-03-07 RX ORDER — ALBUTEROL SULFATE 90 UG/1
108 (90 BASE) INHALANT RESPIRATORY (INHALATION)
Qty: 1 | Refills: 2 | Status: ACTIVE | COMMUNITY
Start: 2022-09-26 | End: 1900-01-01

## 2023-03-07 NOTE — PHYSICAL EXAM
[General Appearance - Well Developed] : well developed [Normal Conjunctiva] : the conjunctiva exhibited no abnormalities [III] : III [Heart Sounds] : normal S1 and S2 [Bowel Sounds] : normal bowel sounds [Abdomen Soft] : soft [Abnormal Walk] : normal gait [Cyanosis, Localized] : no localized cyanosis [1+ Pitting] : 1+  pitting [Skin Color & Pigmentation] : normal skin color and pigmentation [] : no rash [No Venous Stasis] : no venous stasis [Skin Lesions] : no skin lesions [No Xanthoma] : no  xanthoma was observed [No Skin Ulcers] : no skin ulcer [Deep Tendon Reflexes (DTR)] : deep tendon reflexes were 2+ and symmetric [Impaired Insight] : insight and judgment were intact [Skin Turgor] : normal skin turgor [FreeTextEntry1] : no spine tenderness

## 2023-03-07 NOTE — HISTORY OF PRESENT ILLNESS
[Former] : former [TextBox_4] : -----This letter  is regarding your patient  who  attended pulmonary out patient office today.  I have reviewed  patient's  past history, social history, family history and medication list. I also  reviewed nurse practitioners/ and fellows  notes and assessment and agree with it.  –The patient was referred by ------\par \par 89 yr -year-old male history of past smoking, elevated PA S/P on echo-----History of coronary artery disease -S/P STENT PALCEMENT [ RCA  stent june 0217 ].--  --  \par \par .no history of , fever, chills , rigors, chestpain, or hemoptysis. Questionable history of Raynauds phenomenon. No h/o significant weight loss in last few months. No history of liver dysfunction , collagen vascular disorder or chronic thromboembolic disease. I would classify her dyspnea as WHO  FUNCTIONAL CLASS II--------\par \par -\par \par ECHO  --  --12/2016 severe LAE, moderate TREY EF=68%, est PASP= 48-53mmHg-------\par ---Echo  date-------JUN 2018------RVSP 45-50 mmhg, moderately elevated-----mild RA enlargement----mild to moderate AR, mild MR----\par ECHO 12/2018--- [ DR SANCHEZ]   -dilated LV LVEF 70 LVH with diastolic dysfunction moderate aortic regurgitation PS the 45, SEVER DILATED LA \par \par ---PSG ---JC  ON BIPAP 16/9\par \par ----Pft date---------7/2017--hyperinflation\par \par ----Ct scanchest 7/27/17 IMPRESSION:\par \par Mild  dilatation  of  the  main  pulmonary  artery,  which  can  be  seen  in  pulmonary\par arterial  hypertension.\par \par No  traction  bronchiectasis  or  honeycombing  to  suggest  pulmonary  fibrosis.\par \par Nonspecific  4  mm  nodule  in  the  left  lower  lobe.  Follow-up  in  one  year  with  a\par low-dose  noncontrast  CT  scan  of  the  chest  can  be  obtained  to  evaluate  for\par stability.\par \par 6  mm  nondependent  opacity  within  the  trachea  may  represent  adherent\par secretions;  attention  to  this  area  on  the  follow-up  exam  is  advised  to\par evaluate  for  resolution  and  to  exclude  an  endotracheal  lesion.\par \par \par ct 11/2018 \par IMPRESSION: \par 1. The 3 mm noncalcified nodule is unchanged since 7/27/2017. \par \par -----Cath date----------June 19 2017--stent proximal  RCA-\par \par \par cardiac   nm report 7/2021     Abnormal myocardial perfusion imaging, small area of apical wall infarct. No sign. ischemia. Normal LV size and function. Calculated EF is 58%. No significant changes as parents to prior 6/2020.\par \par ----SEPT 2017---------coming for follow up---------is getting whitish mucous------has been told by ENT [Dr. Gage LANDRY] that mucous is related to acid reflux-----------had a sleep study completed [AHI 75] t 90% 8.1-------\par \par sept 2019  \par JC  remains compliant with biPAP----and benefits from its use--\par  secondary pulm htn -recently started on lasix for edema,  follows cardiology DR SANCHEZ [[facility]]\par COPD_ using symbicort 160 bid- doing well from resp perspective \par \par ------JAN 2020------pt stable from pulmonary point of view-----remains compliant with BiPAP-----using symbicort and ventolin----\par \par dec 2020 -- pt complaining of increasing clear sputum production and SOB on exertion. Started mucinex in Sept 2020, noticed sputum has been more loose. Pt was recently hospitalized for bowel rest after gastric issues. Pt's wife recently passed in July 2020. Compliant with BiPAP at night. ---on  WIXELA MDU AND VENTOLIN PRN ---ADD FLONASE -- -COURSE OF Z PACK--SHORT COURSE OF PREDNISONE TO HELP COUGH---NEEDS REPEAT CT CHEST----CONDT CPAP \par \par cxr 4/2021 clear lungs\par \par 9/2021 copd- uses wixela and albuterol prn- cough w/clear sputum , RIVERA\par OSA_ non compliant to bipap\par He is up to date w/covid vac\par recent cardiac w/u with DR Sanchez\par Admitted 4/2021 for cellulitis\par \par jan 2022  copd- uon trelegy - cough w/clear sputum , RIVERA\par OSA_ -----compliant to bipap---uses azalestone\par He is up to date w/covid vac\par recent cardiac w/u with DR Sanchez--getting holter through her offcie------\par \par \par 9/2022 copd- doing well on trelegy & ventolin- no resp complaints\par Having pedal edema- on diuretics-follows urology. cardiac w/u with DR Sanchez- Getting vein ablation of lower extremities wth vascular\par OSA_ -----compliant to bipap---benefits from its nightly use\par \par MARCH 2023--------H/O  COPD- doing well on trelegy & ventolin-\par Having pedal edema- on diuretics-follows urology. cardiac w/u with DR ESTRELLA ---[facility]-- - - -\par OSA_ -----compliant to bipap---benefits from its nightly use

## 2023-03-07 NOTE — DISCUSSION/SUMMARY
[FreeTextEntry1] :  Assessment plan---------- patient has been referred here for further opinion regarding pulmonary problem----------------89  -year-old male past history of smoking history of coronary disease [  RCA STENT JUNE 2017]-with secondary pulmonary hypertension\par \par 1 COPD --- on Trelegy-and ventolin as needed------- the correct method of taking inhalers was discussed with patient in great detail again\par \par 2-SECONDARY --pulmonary hypertension----appear secondary to his underlying LV dysfunction--[ RCA  stent june 0217,  DIASTOLIC DYSFUNCTION, AORTIC VALVE DISEASE ---i don't think there is any role for pulmonary vasodilator treatment in this situation----I did bring up the issue of right heart catheterization to better define the etiology of pulmonary hypertension----patient at this time feels that he is exercise  tolerance is acceptable-----HE  HAS  AORTIC REGURGITATION AND IS  BEING MANAGED BY DR ESTRELLA  [facility]----- -  keep euvolemic- on lasix.  add aldactone . \par \par 3--JC--on  bipap and benefits from nightly use------ need compliance report\par \par \par 4) labs drawn in our office today\par 5) get ct chest \par 6) f/u in 6 m\par \par \par -Thanks for allowing  me to participate  in the care of this patient.  Patient at this time  will follow  the above mentioned recommendations and return back for follow up visit. If you have any questions  I can be reached  at [phone]  or  [phone].  \par \par \par Galilea Car MD, FCCP \par Director, Pulmonary Hypertension Program \par [facility] \par Division of Pulmonary, Critical Care and Sleep Medicine \par  Professor of Medicine \par [employer]\par \par \par Taylor Little ANP-C\par

## 2023-03-08 ENCOUNTER — TRANSCRIPTION ENCOUNTER (OUTPATIENT)
Age: 88
End: 2023-03-08

## 2023-03-08 ENCOUNTER — RX RENEWAL (OUTPATIENT)
Age: 88
End: 2023-03-08

## 2023-03-08 LAB
25(OH)D3 SERPL-MCNC: 29.2 NG/ML
ALBUMIN SERPL ELPH-MCNC: 4.4 G/DL
ALP BLD-CCNC: 75 U/L
ALT SERPL-CCNC: 20 U/L
ANION GAP SERPL CALC-SCNC: 16 MMOL/L
AST SERPL-CCNC: 20 U/L
BASOPHILS # BLD AUTO: 0.06 K/UL
BASOPHILS NFR BLD AUTO: 0.6 %
BILIRUB SERPL-MCNC: 0.6 MG/DL
BUN SERPL-MCNC: 14 MG/DL
CALCIUM SERPL-MCNC: 9.3 MG/DL
CHLORIDE SERPL-SCNC: 104 MMOL/L
CHOLEST SERPL-MCNC: 153 MG/DL
CO2 SERPL-SCNC: 23 MMOL/L
CREAT SERPL-MCNC: 0.97 MG/DL
EGFR: 75 ML/MIN/1.73M2
EOSINOPHIL # BLD AUTO: 0.15 K/UL
EOSINOPHIL NFR BLD AUTO: 1.6 %
ESTIMATED AVERAGE GLUCOSE: 126 MG/DL
FERRITIN SERPL-MCNC: 112 NG/ML
GLUCOSE SERPL-MCNC: 77 MG/DL
HBA1C MFR BLD HPLC: 6 %
HCT VFR BLD CALC: 46.8 %
HDLC SERPL-MCNC: 52 MG/DL
HGB BLD-MCNC: 15.5 G/DL
IMM GRANULOCYTES NFR BLD AUTO: 0.8 %
LDLC SERPL CALC-MCNC: 75 MG/DL
LYMPHOCYTES # BLD AUTO: 1.5 K/UL
LYMPHOCYTES NFR BLD AUTO: 16.2 %
MAN DIFF?: NORMAL
MCHC RBC-ENTMCNC: 29.9 PG
MCHC RBC-ENTMCNC: 33.1 GM/DL
MCV RBC AUTO: 90.3 FL
MONOCYTES # BLD AUTO: 0.95 K/UL
MONOCYTES NFR BLD AUTO: 10.3 %
NEUTROPHILS # BLD AUTO: 6.51 K/UL
NEUTROPHILS NFR BLD AUTO: 70.5 %
NONHDLC SERPL-MCNC: 101 MG/DL
PLATELET # BLD AUTO: 229 K/UL
POTASSIUM SERPL-SCNC: 3.8 MMOL/L
PROT SERPL-MCNC: 6.9 G/DL
PSA FREE FLD-MCNC: 25 %
PSA FREE SERPL-MCNC: 0.2 NG/ML
PSA SERPL-MCNC: 0.82 NG/ML
RBC # BLD: 5.18 M/UL
RBC # FLD: 13.9 %
SODIUM SERPL-SCNC: 143 MMOL/L
TRIGL SERPL-MCNC: 134 MG/DL
TSH SERPL-ACNC: 3.04 UIU/ML
WBC # FLD AUTO: 9.24 K/UL

## 2023-03-08 RX ORDER — FLUTICASONE PROPIONATE AND SALMETEROL 250; 50 UG/1; UG/1
250-50 POWDER RESPIRATORY (INHALATION)
Qty: 180 | Refills: 0 | Status: ACTIVE | COMMUNITY
Start: 2020-02-24 | End: 1900-01-01

## 2023-04-23 ENCOUNTER — NON-APPOINTMENT (OUTPATIENT)
Age: 88
End: 2023-04-23

## 2023-04-24 RX ORDER — FLUTICASONE FUROATE, UMECLIDINIUM BROMIDE AND VILANTEROL TRIFENATATE 100; 62.5; 25 UG/1; UG/1; UG/1
100-62.5-25 POWDER RESPIRATORY (INHALATION)
Qty: 60 | Refills: 0 | Status: ACTIVE | COMMUNITY
Start: 2023-04-24 | End: 1900-01-01

## 2023-04-27 NOTE — ED ADULT NURSE NOTE - CADM POA URETHRAL CATHETER
I have attempted to contact this patient by phone but there is no response.  L/m  for a call back      No

## 2023-05-18 ENCOUNTER — APPOINTMENT (OUTPATIENT)
Dept: UROLOGY | Facility: CLINIC | Age: 88
End: 2023-05-18
Payer: MEDICARE

## 2023-05-18 VITALS
DIASTOLIC BLOOD PRESSURE: 68 MMHG | HEIGHT: 67 IN | TEMPERATURE: 97.8 F | BODY MASS INDEX: 34.21 KG/M2 | RESPIRATION RATE: 17 BRPM | WEIGHT: 218 LBS | SYSTOLIC BLOOD PRESSURE: 133 MMHG | HEART RATE: 66 BPM

## 2023-05-18 DIAGNOSIS — N39.41 URGE INCONTINENCE: ICD-10-CM

## 2023-05-18 PROCEDURE — 99212 OFFICE O/P EST SF 10 MIN: CPT

## 2023-05-18 NOTE — PHYSICAL EXAM
[Normal Appearance] : normal appearance [Well Groomed] : well groomed [Abdomen Soft] : soft [Abdomen Tenderness] : non-tender [Urinary Bladder Findings] : the bladder was normal on palpation [Heart Rate And Rhythm] : Heart rate and rhythm were normal [] : no respiratory distress [Exaggerated Use Of Accessory Muscles For Inspiration] : no accessory muscle use

## 2023-06-01 NOTE — ASSESSMENT
[FreeTextEntry1] : Urinary function improved with current medication regimen.\par Follow-up in 1 year.

## 2023-06-01 NOTE — HISTORY OF PRESENT ILLNESS
[FreeTextEntry1] : Patient is a 89 year old man comes in today for BPH/LUTS \par BPH:  remains on finasteride 5 mg and tamsulosin 0.4 mg once daily.\par LUTS: remains on mirabegron 25 mg and oxybutynin ER 5 once daily.\par \par He feels that stream is good. He reports that urgency which has improved \par Denies any hematuria, dysuria or incontinence.

## 2023-07-16 ENCOUNTER — NON-APPOINTMENT (OUTPATIENT)
Age: 88
End: 2023-07-16

## 2023-07-18 ENCOUNTER — TRANSCRIPTION ENCOUNTER (OUTPATIENT)
Age: 88
End: 2023-07-18

## 2023-07-21 ENCOUNTER — TRANSCRIPTION ENCOUNTER (OUTPATIENT)
Age: 88
End: 2023-07-21

## 2023-07-24 ENCOUNTER — TRANSCRIPTION ENCOUNTER (OUTPATIENT)
Age: 88
End: 2023-07-24

## 2023-07-24 DIAGNOSIS — J44.9 CHRONIC OBSTRUCTIVE PULMONARY DISEASE, UNSPECIFIED: ICD-10-CM

## 2023-08-23 ENCOUNTER — TRANSCRIPTION ENCOUNTER (OUTPATIENT)
Age: 88
End: 2023-08-23

## 2023-09-08 ENCOUNTER — RX RENEWAL (OUTPATIENT)
Age: 88
End: 2023-09-08

## 2023-09-08 ENCOUNTER — APPOINTMENT (OUTPATIENT)
Dept: PULMONOLOGY | Facility: CLINIC | Age: 88
End: 2023-09-08
Payer: MEDICARE

## 2023-09-08 VITALS
HEIGHT: 67 IN | HEART RATE: 72 BPM | DIASTOLIC BLOOD PRESSURE: 74 MMHG | WEIGHT: 220 LBS | OXYGEN SATURATION: 91 % | BODY MASS INDEX: 34.53 KG/M2 | RESPIRATION RATE: 16 BRPM | SYSTOLIC BLOOD PRESSURE: 135 MMHG | TEMPERATURE: 98.3 F

## 2023-09-08 DIAGNOSIS — R05.9 COUGH, UNSPECIFIED: ICD-10-CM

## 2023-09-08 PROCEDURE — 99215 OFFICE O/P EST HI 40 MIN: CPT | Mod: 25

## 2023-09-08 PROCEDURE — ZZZZZ: CPT

## 2023-09-08 PROCEDURE — 94010 BREATHING CAPACITY TEST: CPT

## 2023-09-08 NOTE — DISCUSSION/SUMMARY
[FreeTextEntry1] :  Assessment plan---------- patient has been referred here for further opinion regarding pulmonary problem--------89-year-old male past history of smoking history of coronary disease [  RCA STENT JUNE 2017]-with secondary pulmonary hypertension  1 COPD --- on breztri -and ventolin as needed-----add flonase for PND/cough  2-SECONDARY --pulmonary hypertension----appear secondary to his underlying LV dysfunction--[ RCA  stent june 0217,  DIASTOLIC DYSFUNCTION, AORTIC VALVE DISEASE ---i don't think there is any role for pulmonary vasodilator treatment in this situation----I did bring up the issue of right heart catheterization to better define the etiology of pulmonary hypertension----patient at this time feels that he is exercise  tolerance is acceptable-----HE  HAS  AORTIC REGURGITATION AND IS  BEING MANAGED BY DR ESTRELLA  [facility]----- -  keep euvolemic- on lasix.  add aldactone .   3--JC--on  bipap and benefits from nightly use----  4-PFT today 5-  f/u in 6 m   -Thanks for allowing  me to participate  in the care of this patient.  Patient at this time  will follow  the above mentioned recommendations and return back for follow up visit. If you have any questions  I can be reached  at #  [phone].     Galilea Car MD, [facility]

## 2023-09-08 NOTE — HISTORY OF PRESENT ILLNESS
[Former] : former [TextBox_4] : -----This letter  is regarding your patient  who  attended pulmonary out patient office today.  I have reviewed  patient's  past history, social history, family history and medication list. I also  reviewed nurse practitioners/ and fellows  notes and assessment and agree with it.  -The patient was referred by ------  89 yr -year-old male history of past smoking, elevated PA S/P on echo-----History of coronary artery disease -S/P STENT PALCEMENT [ RCA  stent june 0217 ].--  --    .no history of , fever, chills , rigors, chestpain, or hemoptysis. Questionable history of Raynauds phenomenon. No h/o significant weight loss in last few months. No history of liver dysfunction , collagen vascular disorder or chronic thromboembolic disease. I would classify her dyspnea as WHO  FUNCTIONAL CLASS II--------  -  ECHO  --  --12/2016 severe LAE, moderate TREY EF=68%, est PASP= 48-53mmHg------- ---Echo  date-------JUN 2018------RVSP 45-50 mmhg, moderately elevated-----mild RA enlargement----mild to moderate AR, mild MR---- ECHO 12/2018--- [ DR SANCHEZ]   -dilated LV LVEF 70 LVH with diastolic dysfunction moderate aortic regurgitation PS the 45, SEVER DILATED LA   ---PSG ---JC  ON BIPAP 16/9  ----Pft date---------7/2017--hyperinflation  ----Ct scanchest 7/27/17 IMPRESSION:  Mild  dilatation  of  the  main  pulmonary  artery,  which  can  be  seen  in  pulmonary arterial  hypertension.  No  traction  bronchiectasis  or  honeycombing  to  suggest  pulmonary  fibrosis.  Nonspecific  4  mm  nodule  in  the  left  lower  lobe.  Follow-up  in  one  year  with  a low-dose  noncontrast  CT  scan  of  the  chest  can  be  obtained  to  evaluate  for stability.  6  mm  nondependent  opacity  within  the  trachea  may  represent  adherent secretions;  attention  to  this  area  on  the  follow-up  exam  is  advised  to evaluate  for  resolution  and  to  exclude  an  endotracheal  lesion.   ct 11/2018  IMPRESSION:  1. The 3 mm noncalcified nodule is unchanged since 7/27/2017.   -----Cath date----------June 19 2017--stent proximal  RCA-   cardiac   nm report 7/2021     Abnormal myocardial perfusion imaging, small area of apical wall infarct. No sign. ischemia. Normal LV size and function. Calculated EF is 58%. No significant changes as parents to prior 6/2020.  ----SEPT 2017---------coming for follow up---------is getting whitish mucous------has been told by ENT [Dr. Gage LANDRY] that mucous is related to acid reflux-----------had a sleep study completed [AHI 75] t 90% 8.1-------  sept 2019   JC  remains compliant with biPAP----and benefits from its use--  secondary pulm htn -recently started on lasix for edema,  follows cardiology DR SANCHEZ [[place]] COPD_ using symbicort 160 bid- doing well from resp perspective   ------JAN 2020------pt stable from pulmonary point of view-----remains compliant with BiPAP-----using symbicort and ventolin----  dec 2020 -- pt complaining of increasing clear sputum production and SOB on exertion. Started mucinex in Sept 2020, noticed sputum has been more loose. Pt was recently hospitalized for bowel rest after gastric issues. Pt's wife recently passed in July 2020. Compliant with BiPAP at night. ---on  WIXELA MDU AND VENTOLIN PRN ---ADD FLONASE -- -COURSE OF Z PACK--SHORT COURSE OF PREDNISONE TO HELP COUGH---NEEDS REPEAT CT CHEST----CONDT CPAP   cxr 4/2021 clear lungs  9/2021 copd- uses wixela and albuterol prn- cough w/clear sputum , RIVERA OSA_ non compliant to bipap He is up to date w/covid vac recent cardiac w/u with DR Sanchez Admitted 4/2021 for cellulitis  jan 2022  copd- uon trelegy - cough w/clear sputum , RIVERA OSA_ -----compliant to bipap---uses azalestone He is up to date w/covid vac recent cardiac w/u with DR Sanchez--getting holter through her offcie------   9/2022 copd- doing well on trelegy & ventolin- no resp complaints Having pedal edema- on diuretics-follows urology. cardiac w/u with DR Sanchez- Getting vein ablation of lower extremities [facility] vascular OSA_ -----compliant to bipap---benefits from its nightly use  MARCH 2023--------H/O  COPD- doing well on trelegy & ventolin- Having pedal edema- on diuretics-follows urology. cardiac w/u with DR ESTRELLA ---[facility]-- - - - OSA_ -----compliant to bipap---benefits from its nightly use  9/2023 copd on breztri- doing well but occas cough with clear sputum pedal edema- on lasix jc- compliant to nightly bipapa and benefits from its use

## 2023-10-19 ENCOUNTER — RX RENEWAL (OUTPATIENT)
Age: 88
End: 2023-10-19

## 2023-10-25 NOTE — PHYSICAL THERAPY INITIAL EVALUATION ADULT - GENERAL OBSERVATIONS, REHAB EVAL
Patient scheduled screening colonoscopy 01/24/24 with daphne Garcia bowel prep instructions mailed today.   Patient received seated in chair, cooperative with physical therapy evaluation, + IV

## 2023-11-09 ENCOUNTER — TRANSCRIPTION ENCOUNTER (OUTPATIENT)
Age: 88
End: 2023-11-09

## 2023-11-17 ENCOUNTER — RX RENEWAL (OUTPATIENT)
Age: 88
End: 2023-11-17

## 2023-11-17 RX ORDER — BUDESONIDE, GLYCOPYRROLATE, AND FORMOTEROL FUMARATE 160; 9; 4.8 UG/1; UG/1; UG/1
160-9-4.8 AEROSOL, METERED RESPIRATORY (INHALATION)
Qty: 10.7 | Refills: 0 | Status: ACTIVE | COMMUNITY
Start: 2023-07-24 | End: 1900-01-01

## 2023-12-05 ENCOUNTER — RX RENEWAL (OUTPATIENT)
Age: 88
End: 2023-12-05

## 2024-02-12 ENCOUNTER — APPOINTMENT (OUTPATIENT)
Dept: PULMONOLOGY | Facility: CLINIC | Age: 89
End: 2024-02-12
Payer: MEDICARE

## 2024-02-12 VITALS
OXYGEN SATURATION: 97 % | WEIGHT: 204 LBS | TEMPERATURE: 98 F | HEIGHT: 67 IN | HEART RATE: 62 BPM | RESPIRATION RATE: 15 BRPM | BODY MASS INDEX: 32.02 KG/M2 | SYSTOLIC BLOOD PRESSURE: 124 MMHG | DIASTOLIC BLOOD PRESSURE: 62 MMHG

## 2024-02-12 PROCEDURE — 94762 N-INVAS EAR/PLS OXIMTRY CONT: CPT

## 2024-02-12 PROCEDURE — 99215 OFFICE O/P EST HI 40 MIN: CPT | Mod: 25

## 2024-02-12 NOTE — DISCUSSION/SUMMARY
Post-Care Instructions: I reviewed with the patient in detail post-care instructions. Patient is to wear sunprotection, and avoid picking at any of the treated lesions. Pt may apply Vaseline  or Aquaphor to crusted or scabbing areas. [FreeTextEntry1] :  Assessment plan---------- patient has been referred here for further opinion regarding pulmonary problem--------89-year-old male past history of smoking history of coronary disease [  RCA STENT JUNE 2017]-with secondary pulmonary hypertension  1 COPD --- on breztri -and ventolin as needed-----continue flonase for PND/cough  2-SECONDARY --pulmonary hypertension----appear secondary to his underlying LV dysfunction--[ RCA  stent june 0217,  DIASTOLIC DYSFUNCTION, AORTIC VALVE DISEASE ---i don't think there is any role for pulmonary vasodilator treatment in this situation----I did bring up the issue of right heart catheterization to better define the etiology of pulmonary hypertension----patient at this time feels that he is exercise  tolerance is acceptable-----HE  HAS  AORTIC REGURGITATION AND IS  BEING MANAGED BY DR ESTRELLA  [facility]----- -  keep euvolemic- on lasix.    3--JC--currently off bipap d/t dental work  UNABKLE TO USE BIAP DUE TP  DENTAL ISSUES--- WILL , get overnight pulse ox  4 --annual PFT  5-  f/u in 6 m   -Thanks for allowing  me to participate  in the care of this patient.  Patient at this time  will follow  the above mentioned recommendations and return back for follow up visit. If you have any questions  I can be reached  at #  [phone].     Galilea Car MD, [facility]   Detail Level: Detailed Render Post-Care Instructions In Note?: no Duration Of Freeze Thaw-Cycle (Seconds): 0 Consent: The patient's consent was obtained including but not limited to risks of crusting, scabbing, blistering, scarring, darker or lighter pigmentary change, recurrence, incomplete removal and infection.

## 2024-02-12 NOTE — HISTORY OF PRESENT ILLNESS
[Former] : former [TextBox_4] : -----This letter  is regarding your patient  who  attended pulmonary out patient office today.  I have reviewed  patient's  past history, social history, family history and medication list. I also  reviewed nurse practitioners/ and fellows  notes and assessment and agree with it.  -The patient was referred by ------  89    yr -year-old male history of past smoking, elevated PA S/P on echo-----History of coronary artery disease -S/P STENT PALCEMENT [ RCA  stent june 0217 ].--  --    .no history of , fever, chills , rigors, chestpain, or hemoptysis. Questionable history of Raynauds phenomenon. No h/o significant weight loss in last few months. No history of liver dysfunction , collagen vascular disorder or chronic thromboembolic disease. I would classify her dyspnea as WHO  FUNCTIONAL CLASS II--------   ECHO  --  --12/2016 severe LAE, moderate TREY EF=68%, est PASP= 48-53mmHg------- ---Echo  date-------JUN 2018------RVSP 45-50 mmhg, moderately elevated-----mild RA enlargement----mild to moderate AR, mild MR---- ECHO 12/2018--- [ DR SANCHEZ]   -dilated LV LVEF 70 LVH with diastolic dysfunction moderate aortic regurgitation PS the 45, SEVER DILATED LA   ---PSG ---JC  ON BIPAP 16/9  ----Pft date---------7/2017--hyperinflation  ----Ct scanchest 7/27/17 IMPRESSION:  Mild  dilatation  of  the  main  pulmonary  artery,  which  can  be  seen  in  pulmonary arterial  hypertension.  No  traction  bronchiectasis  or  honeycombing  to  suggest  pulmonary  fibrosis.  Nonspecific  4  mm  nodule  in  the  left  lower  lobe.  Follow-up  in  one  year  with  a low-dose  noncontrast  CT  scan  of  the  chest  can  be  obtained  to  evaluate  for stability.  6  mm  nondependent  opacity  within  the  trachea  may  represent  adherent secretions;  attention  to  this  area  on  the  follow-up  exam  is  advised  to evaluate  for  resolution  and  to  exclude  an  endotracheal  lesion.   ct 11/2018  IMPRESSION:  1. The 3 mm noncalcified nodule is unchanged since 7/27/2017.   -----Cath date----------June 19 2017--stent proximal  RCA-   cardiac   nm report 7/2021     Abnormal myocardial perfusion imaging, small area of apical wall infarct. No sign. ischemia. Normal LV size and function. Calculated EF is 58%. No significant changes as parents to prior 6/2020.  ----SEPT 2017---------coming for follow up---------is getting whitish mucous------has been told by ENT [Dr. Gage LANDRY] that mucous is related to acid reflux-----------had a sleep study completed [AHI 75] t 90% 8.1-------  sept 2019   JC  remains compliant with biPAP----and benefits from its use--  secondary pulm htn -recently started on lasix for edema,  follows cardiology DR SANCHEZ [[facility]] COPD_ using symbicort 160 bid- doing well from resp perspective   ------JAN 2020------pt stable from pulmonary point of view-----remains compliant with BiPAP-----using symbicort and ventolin----  dec 2020 -- pt complaining of increasing clear sputum production and SOB on exertion. Started mucinex in Sept 2020, noticed sputum has been more loose. Pt was recently hospitalized for bowel rest after gastric issues. Pt's wife recently passed in July 2020. Compliant with BiPAP at night. ---on  WIXELA MDU AND VENTOLIN PRN ---ADD FLONASE -- -COURSE OF Z PACK--SHORT COURSE OF PREDNISONE TO HELP COUGH---NEEDS REPEAT CT CHEST----CONDT CPAP   cxr 4/2021 clear lungs  9/2021 copd- uses wixela and albuterol prn- cough w/clear sputum , RIVERA OSA_ non compliant to bipap He is up to date w/covid vac recent cardiac w/u with DR Sanchez Admitted 4/2021 for cellulitis  jan 2022  copd- uon trelegy - cough w/clear sputum , RIVERA OSA_ -----compliant to bipap---uses azalestone He is up to date w/covid vac recent cardiac w/u with DR Sanchez--getting holter through her offcie------   9/2022 copd- doing well on trelegy & ventolin- no resp complaints Having pedal edema- on diuretics-follows urology. cardiac w/u with DR Sanchez- Getting vein ablation of lower extremities [facility] vascular OSA_ -----compliant to bipap---benefits from its nightly use  MARCH 2023--------H/O  COPD- doing well on trelegy & ventolin- Having pedal edema- on diuretics-follows urology. cardiac w/u with DR ESTRELLA ---[facility]-- - - - OSA_ -----compliant to bipap---benefits from its nightly use  9/2023  -- - - copd on breztri- doing well but occas cough with clear sputum pedal edema- on lasix jc- compliant to nightly bipap and benefits from its use  2/2024 COPD- on breztri- doing well from pulm perspective- occasional tickling cough JC- has not been compliant to nightly bipap d/t recent dental work UTD w/vacs

## 2024-02-20 ENCOUNTER — TRANSCRIPTION ENCOUNTER (OUTPATIENT)
Age: 89
End: 2024-02-20

## 2024-03-05 ENCOUNTER — RX RENEWAL (OUTPATIENT)
Age: 89
End: 2024-03-05

## 2024-03-05 RX ORDER — FLUTICASONE PROPIONATE 50 UG/1
50 SPRAY, METERED NASAL DAILY
Qty: 48 | Refills: 2 | Status: ACTIVE | COMMUNITY
Start: 2023-09-08 | End: 1900-01-01

## 2024-03-07 NOTE — ED PROVIDER NOTE - ATTENDING CONTRIBUTION TO CARE
[Other: ____] : [unfilled] [Right] : of the right [3rd] : 3rd [Intermetatarsal space] : intermetatarsal space [Pain] : pain [Ethyl Chloride sprayed topically] : ethyl chloride sprayed topically [Alcohol] : alcohol [___ cc    6mg] :  Betamethasone (Celestone) ~Vcc of 6mg [Sterile technique used] : sterile technique used [___ cc    1%] : Lidocaine ~Vcc of 1%  [___ cc    0.5%] : Bupivacaine (Marcaine) ~Vcc of 0.5%  [Call if redness, pain or fever occur] : call if redness, pain or fever occur [] : Patient tolerated procedure well [Apply ice for 15min out of every hour for the next 12-24 hours as tolerated] : apply ice for 15 minutes out of every hour for the next 12-24 hours as tolerated [Previous OTC use and PT nontherapeutic] : patient has tried OTC's including aspirin, Ibuprofen, Aleve, etc or prescription NSAIDS, and/or exercises at home and/or physical therapy without satisfactory response [Patient had decreased mobility in the joint] : patient had decreased mobility in the joint [Risks, benefits, alternatives discussed / Verbal consent obtained] : the risks benefits, and alternatives have been discussed, and verbal consent was obtained Pt is a 86 yo male who presents to the ED with a cc of right LE redness. PMHx of Anxiety, BPH (Benign Prostatic Hyperplasia), CAD (coronary artery disease), COPD (chronic obstructive pulmonary disease)  mild (no home oxygen), Hyperlipemia, Hypertension, Pulmonary hypertension moderate. Pt reports that approx 1 week ago he noted some itching to his right upper inner thigh. He does admit that he owns a cat who sits on his lap frequently and will scratch him. He denies noting any recent acute trauma to the region. He reports that he first noted redness to his right upper inner thigh last night and that the redness worsened today and began to streak up         87 y male presents with right leg streaking cellulitis, extending from medial right knee to right medial thigh, states he first noticed itching in the area 1 week ago, denies fever, nvd,  states he has a cat that sits on his lap often, states "when he jumps , he sometimes scratches my legs",  states he was seen by Dr Santiago at urgent care today. advised come to ED for evaluation.  former smoker, no etoh.  PMD Dr Sharlene Martínez Pt is a 88 yo male who presents to the ED with a cc of right LE redness. PMHx of Anxiety, BPH (Benign Prostatic Hyperplasia), CAD (coronary artery disease), COPD (chronic obstructive pulmonary disease)  mild (no home oxygen), Hyperlipemia, Hypertension, Pulmonary hypertension moderate. Pt reports that approx 1 week ago he noted some itching to his right upper inner thigh. He does admit that he owns a cat who sits on his lap frequently and will scratch him. He denies noting any recent acute trauma to the region. He reports that he first noted redness to his right upper inner thigh last night and that the redness worsened today and began to streak up his right leg. Has mild TTP at site. Denies fever, chills, N/V, CP, SOB, abd pain. Denies h/o PE or DVT. Denies recent long car rides or trips. Denies h/o COVID 19 has received his first Moderna vaccin and has f/u for second on Monday. Pt reports that he was seen at urgent care and sent to the ED for elv. On exam pt lying in bed NAD, NCAT, heart RR, lungs CTA, abd soft NT/ND. RLE: small raised region overlying varicose vein just superior and medial to knee with streaking redness up to the groin. Increased warmth noted over redness. FROM of hip and knee with no pain on ROM. No calf swelling noted, sensation grossly intact to RLE +peal pulse. Multiple varicose veins noted to LE. Pt presenting with concern for superficial thrombophlebitis, vs DVT vs cellulitis given h/o cat scratches. Will obtain screening labs, begin abx, and obtain venous duplex. Pt advised due to varicose veins pt should be wearing compression stockings

## 2024-03-19 ENCOUNTER — TRANSCRIPTION ENCOUNTER (OUTPATIENT)
Age: 89
End: 2024-03-19

## 2024-04-15 NOTE — ED PROVIDER NOTE - CONTACT TIME
Quality 226: Preventive Care And Screening: Tobacco Use: Screening And Cessation Intervention: Patient screened for tobacco use and is an ex/non-smoker Detail Level: Detailed 26-Jun-2020 20:40

## 2024-04-26 ENCOUNTER — TRANSCRIPTION ENCOUNTER (OUTPATIENT)
Age: 89
End: 2024-04-26

## 2024-05-16 ENCOUNTER — APPOINTMENT (OUTPATIENT)
Dept: UROLOGY | Facility: CLINIC | Age: 89
End: 2024-05-16
Payer: MEDICARE

## 2024-05-16 VITALS
WEIGHT: 195 LBS | SYSTOLIC BLOOD PRESSURE: 136 MMHG | DIASTOLIC BLOOD PRESSURE: 67 MMHG | HEIGHT: 67 IN | HEART RATE: 71 BPM | BODY MASS INDEX: 30.61 KG/M2 | RESPIRATION RATE: 16 BRPM

## 2024-05-16 DIAGNOSIS — N40.1 BENIGN PROSTATIC HYPERPLASIA WITH LOWER URINARY TRACT SYMPMS: ICD-10-CM

## 2024-05-16 DIAGNOSIS — N13.8 BENIGN PROSTATIC HYPERPLASIA WITH LOWER URINARY TRACT SYMPMS: ICD-10-CM

## 2024-05-16 PROCEDURE — G2211 COMPLEX E/M VISIT ADD ON: CPT

## 2024-05-16 PROCEDURE — 99212 OFFICE O/P EST SF 10 MIN: CPT

## 2024-05-16 NOTE — ASSESSMENT
[FreeTextEntry1] : Continue finasteride and tamsulosin for BPH Continue mirabegron 25 mg and oxybutynin ER 5 once daily for LUTS. Follow up in one year.

## 2024-05-16 NOTE — HISTORY OF PRESENT ILLNESS
[FreeTextEntry1] : Patient is a 89 year old man comes in today for BPH/LUTS  BPH: remains on finasteride 5 mg and tamsulosin 0.4 mg once daily.  LUTS: remains on mirabegron 25 mg and oxybutynin ER 5 once daily.  Last PSA 03/07/2023 0.82ng/mL  He feels that stream is good. He reports occasional urgency.   Denies any hematuria, dysuria or incontinence.

## 2024-05-16 NOTE — PHYSICAL EXAM
[Normal Appearance] : normal appearance [] : no respiratory distress [Abdomen Soft] : soft [Normal Station and Gait] : the gait and station were normal for the patient's age [Skin Color & Pigmentation] : normal skin color and pigmentation [No Focal Deficits] : no focal deficits [Oriented To Time, Place, And Person] : oriented to person, place, and time

## 2024-05-30 NOTE — ED PROVIDER NOTE - CCCP TRG CHIEF CMPLNT
The patient is Watcher - Medium risk of patient condition declining or worsening    Shift Goals  Clinical Goals: Monitor VB and assess for s/s of labor  Patient Goals: stay pregnant  Family Goals: emotional support      Problem: Knowledge Deficit - L&D  Goal: Patient and family/caregivers will demonstrate understanding of plan of care, disease process/condition, diagnostic tests and medications  Outcome: Progressing   POC discussed with pt. Pt verbalizes understanding and asks questions as needed.    Problem: Pain  Goal: Patient's pain will be alleviated or reduced to the patient’s comfort goal  Outcome: Progressing  Pain management options discussed with pt. Pt able to verbalize a pain rating on pain scale. Pt will ask for intervention as needed.     Problem: Risk for Injury  Goal: Patient and fetus will be free of preventable injury/complications  Outcome: Progressing   EFM and toco in place for continuous monitoring of FHR and uterine activity. This RN will continue to assess pt bleeding and s/s of labor. Pt head to toe complete as baseline for any changes in pt condition.     pain, neck

## 2024-06-02 ENCOUNTER — TRANSCRIPTION ENCOUNTER (OUTPATIENT)
Age: 89
End: 2024-06-02

## 2024-06-02 RX ORDER — TAMSULOSIN HYDROCHLORIDE 0.4 MG/1
0.4 CAPSULE ORAL
Qty: 90 | Refills: 3 | Status: ACTIVE | COMMUNITY
Start: 2018-08-01 | End: 1900-01-01

## 2024-06-02 RX ORDER — OXYBUTYNIN CHLORIDE 5 MG/1
5 TABLET, EXTENDED RELEASE ORAL
Qty: 90 | Refills: 3 | Status: ACTIVE | COMMUNITY
Start: 2021-12-07 | End: 1900-01-01

## 2024-06-02 RX ORDER — FINASTERIDE 5 MG/1
5 TABLET, FILM COATED ORAL
Qty: 90 | Refills: 3 | Status: ACTIVE | COMMUNITY
Start: 2018-08-01 | End: 1900-01-01

## 2024-06-04 ENCOUNTER — NON-APPOINTMENT (OUTPATIENT)
Age: 89
End: 2024-06-04

## 2024-06-07 ENCOUNTER — RX RENEWAL (OUTPATIENT)
Age: 89
End: 2024-06-07

## 2024-06-07 RX ORDER — MIRABEGRON 25 MG/1
25 TABLET, EXTENDED RELEASE ORAL
Qty: 90 | Refills: 3 | Status: ACTIVE | COMMUNITY
Start: 2020-02-05 | End: 1900-01-01

## 2024-07-23 ENCOUNTER — APPOINTMENT (OUTPATIENT)
Dept: PULMONOLOGY | Facility: CLINIC | Age: 89
End: 2024-07-23
Payer: MEDICARE

## 2024-07-23 VITALS
DIASTOLIC BLOOD PRESSURE: 57 MMHG | TEMPERATURE: 97.5 F | WEIGHT: 196 LBS | BODY MASS INDEX: 30.76 KG/M2 | HEART RATE: 59 BPM | OXYGEN SATURATION: 96 % | HEIGHT: 67 IN | SYSTOLIC BLOOD PRESSURE: 120 MMHG

## 2024-07-23 PROCEDURE — 99214 OFFICE O/P EST MOD 30 MIN: CPT

## 2024-07-23 RX ORDER — BENZONATATE 100 MG/1
100 CAPSULE ORAL 3 TIMES DAILY
Qty: 60 | Refills: 1 | Status: ACTIVE | COMMUNITY
Start: 2024-07-23 | End: 1900-01-01

## 2024-07-23 NOTE — HISTORY OF PRESENT ILLNESS
[Former] : former [TextBox_4] : -----This letter  is regarding your patient  who  attended pulmonary out patient office today.  I have reviewed  patient's  past history, social history, family history and medication list. I also  reviewed nurse practitioners/ and fellows  notes and assessment and agree with it.  -The patient was referred by ------  89   yr -year-old male history of past smoking, elevated PA S/P on echo-----History of coronary artery disease -S/P STENT PALCEMENT [ RCA  stent june 0217 ].--  --    .no history of , fever, chills , rigors, chestpain, or hemoptysis. Questionable history of Raynauds phenomenon. No h/o significant weight loss in last few months. No history of liver dysfunction , collagen vascular disorder or chronic thromboembolic disease. I would classify her dyspnea as WHO  FUNCTIONAL CLASS II--------   ECHO  --  --12/2016 severe LAE, moderate TREY EF=68%, est PASP= 48-53mmHg------- ---Echo  date-------JUN 2018------RVSP 45-50 mmhg, moderately elevated-----mild RA enlargement----mild to moderate AR, mild MR---- ECHO 12/2018--- [ DR SANCHEZ]   -dilated LV LVEF 70 LVH with diastolic dysfunction moderate aortic regurgitation PS the 45, SEVER DILATED LA   ---PSG ---JC  ON BIPAP 16/9  ----Pft date---------7/2017--hyperinflation  ----Ct scanchest 7/27/17 IMPRESSION:  Mild  dilatation  of  the  main  pulmonary  artery,  which  can  be  seen  in  pulmonary arterial  hypertension.  No  traction  bronchiectasis  or  honeycombing  to  suggest  pulmonary  fibrosis.  Nonspecific  4  mm  nodule  in  the  left  lower  lobe.  Follow-up  in  one  year  with  a low-dose  noncontrast  CT  scan  of  the  chest  can  be  obtained  to  evaluate  for stability.  6  mm  nondependent  opacity  within  the  trachea  may  represent  adherent secretions;  attention  to  this  area  on  the  follow-up  exam  is  advised  to evaluate  for  resolution  and  to  exclude  an  endotracheal  lesion.   ct 11/2018  IMPRESSION:  1. The 3 mm noncalcified nodule is unchanged since 7/27/2017.   -----Cath date----------June 19 2017--stent proximal  RCA-   cardiac   nm report 7/2021     Abnormal myocardial perfusion imaging, small area of apical wall infarct. No sign. ischemia. Normal LV size and function. Calculated EF is 58%. No significant changes as parents to prior 6/2020.  ----SEPT 2017---------coming for follow up---------is getting whitish mucous------has been told by ENT [Dr. Gage LANDRY] that mucous is related to acid reflux-----------had a sleep study completed [AHI 75] t 90% 8.1-------  sept 2019   JC  remains compliant with biPAP----and benefits from its use--  secondary pulm htn -recently started on lasix for edema,  follows cardiology DR SANCHEZ [[facility]] COPD_ using symbicort 160 bid- doing well from resp perspective   ------JAN 2020------pt stable from pulmonary point of view-----remains compliant with BiPAP-----using symbicort and ventolin----  dec 2020 -- pt complaining of increasing clear sputum production and SOB on exertion. Started mucinex in Sept 2020, noticed sputum has been more loose. Pt was recently hospitalized for bowel rest after gastric issues. Pt's wife recently passed in July 2020. Compliant with BiPAP at night. ---on  WIXELA MDU AND VENTOLIN PRN ---ADD FLONASE -- -COURSE OF Z PACK--SHORT COURSE OF PREDNISONE TO HELP COUGH---NEEDS REPEAT CT CHEST----CONDT CPAP   cxr 4/2021 clear lungs  9/2021 copd- uses wixela and albuterol prn- cough w/clear sputum , RIVERA OSA_ non compliant to bipap He is up to date w/covid vac recent cardiac w/u with DR Sanchez Admitted 4/2021 for cellulitis  jan 2022  copd- uon trelegy - cough w/clear sputum , RIVERA OSA_ -----compliant to bipap---uses azalestone He is up to date w/covid vac recent cardiac w/u with DR Sanchez--getting holter through her offcie------   9/2022 copd- doing well on trelegy & ventolin- no resp complaints Having pedal edema- on diuretics-follows urology. cardiac w/u with DR Sanchez- Getting vein ablation of lower extremities [facility] vascular OSA_ -----compliant to bipap---benefits from its nightly use  MARCH 2023--------H/O  COPD- doing well on trelegy & ventolin- Having pedal edema- on diuretics-follows urology. cardiac w/u with DR ESTRELLA ---[facility]-- - - - OSA_ -----compliant to bipap---benefits from its nightly use  9/2023  -- - - copd on breztri- doing well but occas cough with clear sputum pedal edema- on lasix jc- compliant to nightly bipap and benefits from its use  2/2024 COPD- on breztri- doing well from pulm perspective- occasional tickling cough JC- has not been compliant to nightly bipap d/t recent dental work UTD w/vacs  7/2024 COPD- on breztri- doing well from pulm perspective- occasional dry  cough JC- on nightly bipap and benefits from its use (77% compliant per download data) Is having some mask leak after switching to nasal mask post dental work

## 2024-07-23 NOTE — HISTORY OF PRESENT ILLNESS
[Former] : former [TextBox_4] : -----This letter  is regarding your patient  who  attended pulmonary out patient office today.  I have reviewed  patient's  past history, social history, family history and medication list. I also  reviewed nurse practitioners/ and fellows  notes and assessment and agree with it.  -The patient was referred by ------  89   yr -year-old male history of past smoking, elevated PA S/P on echo-----History of coronary artery disease -S/P STENT PALCEMENT [ RCA  stent june 0217 ].--  --    .no history of , fever, chills , rigors, chestpain, or hemoptysis. Questionable history of Raynauds phenomenon. No h/o significant weight loss in last few months. No history of liver dysfunction , collagen vascular disorder or chronic thromboembolic disease. I would classify her dyspnea as WHO  FUNCTIONAL CLASS II--------   ECHO  --  --12/2016 severe LAE, moderate TREY EF=68%, est PASP= 48-53mmHg------- ---Echo  date-------JUN 2018------RVSP 45-50 mmhg, moderately elevated-----mild RA enlargement----mild to moderate AR, mild MR---- ECHO 12/2018--- [ DR SANCHEZ]   -dilated LV LVEF 70 LVH with diastolic dysfunction moderate aortic regurgitation PS the 45, SEVER DILATED LA   ---PSG ---JC  ON BIPAP 16/9  ----Pft date---------7/2017--hyperinflation  ----Ct scanchest 7/27/17 IMPRESSION:  Mild  dilatation  of  the  main  pulmonary  artery,  which  can  be  seen  in  pulmonary arterial  hypertension.  No  traction  bronchiectasis  or  honeycombing  to  suggest  pulmonary  fibrosis.  Nonspecific  4  mm  nodule  in  the  left  lower  lobe.  Follow-up  in  one  year  with  a low-dose  noncontrast  CT  scan  of  the  chest  can  be  obtained  to  evaluate  for stability.  6  mm  nondependent  opacity  within  the  trachea  may  represent  adherent secretions;  attention  to  this  area  on  the  follow-up  exam  is  advised  to evaluate  for  resolution  and  to  exclude  an  endotracheal  lesion.   ct 11/2018  IMPRESSION:  1. The 3 mm noncalcified nodule is unchanged since 7/27/2017.   -----Cath date----------June 19 2017--stent proximal  RCA-   cardiac   nm report 7/2021     Abnormal myocardial perfusion imaging, small area of apical wall infarct. No sign. ischemia. Normal LV size and function. Calculated EF is 58%. No significant changes as parents to prior 6/2020.  ----SEPT 2017---------coming for follow up---------is getting whitish mucous------has been told by ENT [Dr. Gage LANDRY] that mucous is related to acid reflux-----------had a sleep study completed [AHI 75] t 90% 8.1-------  sept 2019   JC  remains compliant with biPAP----and benefits from its use--  secondary pulm htn -recently started on lasix for edema,  follows cardiology DR SANCHEZ [[facility]] COPD_ using symbicort 160 bid- doing well from resp perspective   ------JAN 2020------pt stable from pulmonary point of view-----remains compliant with BiPAP-----using symbicort and ventolin----  dec 2020 -- pt complaining of increasing clear sputum production and SOB on exertion. Started mucinex in Sept 2020, noticed sputum has been more loose. Pt was recently hospitalized for bowel rest after gastric issues. Pt's wife recently passed in July 2020. Compliant with BiPAP at night. ---on  WIXELA MDU AND VENTOLIN PRN ---ADD FLONASE -- -COURSE OF Z PACK--SHORT COURSE OF PREDNISONE TO HELP COUGH---NEEDS REPEAT CT CHEST----CONDT CPAP   cxr 4/2021 clear lungs  9/2021 copd- uses wixela and albuterol prn- cough w/clear sputum , RIVERA OSA_ non compliant to bipap He is up to date w/covid vac recent cardiac w/u with DR Sanchez Admitted 4/2021 for cellulitis  jan 2022  copd- uon trelegy - cough w/clear sputum , RIVERA OSA_ -----compliant to bipap---uses azalestone He is up to date w/covid vac recent cardiac w/u with DR Sanchez--getting holter through her offcie------   9/2022 copd- doing well on trelegy & ventolin- no resp complaints Having pedal edema- on diuretics-follows urology. cardiac w/u with DR Sanchez- Getting vein ablation of lower extremities [facility] vascular OSA_ -----compliant to bipap---benefits from its nightly use  MARCH 2023--------H/O  COPD- doing well on trelegy & ventolin- Having pedal edema- on diuretics-follows urology. cardiac w/u with DR ESTRELLA ---[facility]-- - - - OSA_ -----compliant to bipap---benefits from its nightly use  9/2023  -- - - copd on breztri- doing well but occas cough with clear sputum pedal edema- on lasix jc- compliant to nightly bipap and benefits from its use  2/2024 COPD- on breztri- doing well from pulm perspective- occasional tickling cough JC- has not been compliant to nightly bipap d/t recent dental work UTD w/vacs  7/2024 COPD- on breztri- doing well from pulm perspective- occasional dry  cough JC- on nightly bipap and benefits from its use (77% compliant per download data) Is having some mask leak after switching to nasal mask post dental work

## 2024-07-23 NOTE — DISCUSSION/SUMMARY
[FreeTextEntry1] :  Assessment plan---------- patient has been referred here for further opinion regarding pulmonary problem--------89-year-old male past history of smoking history of coronary disease [  RCA STENT JUNE 2017]-with secondary pulmonary hypertension  1 COPD/HYPERACTIEV AIRWASY DISEASE-- --- on breztri -and ventolin as needed-----continue flonase for PND/cough  2-SECONDARY --pulmonary hypertension----appear secondary to his underlying LV dysfunction--[ RCA  stent june 0217,  DIASTOLIC DYSFUNCTION, AORTIC VALVE DISEASE ---i don't think there is any role for pulmonary vasodilator treatment in this situation----I did bring up the issue of right heart catheterization to better define the etiology of pulmonary hypertension----patient at this time feels that he is exercise  tolerance is acceptable-----HE  HAS  AORTIC REGURGITATION AND IS  BEING MANAGED BY DR ESTRELLA  [facility]----- -  keep euvolemic- on lasix.    3--JC- on nightly bipap and benefits from its use (77% compliant per download data)---USING NASAL PILLOWS --- compliance report reviewed has slight leak with the mask----need appointment for mask fitting  4 --annual PFT  5-  f/u in 6 m   -Thanks for allowing  me to participate  in the care of this patient.  Patient at this time  will follow  the above mentioned recommendations and return back for follow up visit. If you have any questions  I can be reached  at #  [phone].     Galilea Car MD, [facility]

## 2024-07-23 NOTE — END OF VISIT
[FreeTextEntry3] :   I, Dr. Galilea Car  personally performed the evaluation and management (E/M) services for this established patient who presents today with (a) new problem(s)/exacerbation of (an) existing condition(s). That E/M includes conducting the clinically appropriate interval history &/or exam, assessing all new/exacerbated conditions, and establishing a new plan of care. Today, my LOU, Taylor Little NP, , was here to observe my evaluation and management service for this new problem/exacerbated condition and follow the plan of care established by me going forward. [Time Spent: ___ minutes] : I have spent [unfilled] minutes of time on the encounter.

## 2024-07-23 NOTE — DISCUSSION/SUMMARY
[FreeTextEntry1] :  Assessment plan---------- patient has been referred here for further opinion regarding pulmonary problem--------89-year-old male past history of smoking history of coronary disease [  RCA STENT JUNE 2017]-with secondary pulmonary hypertension  1 COPD/HYPERACTIEV AIRWASY DISEASE-- --- on breztri -and ventolin as needed-----continue flonase for PND/cough  2-SECONDARY --pulmonary hypertension----appear secondary to his underlying LV dysfunction--[ RCA  stent june 0217,  DIASTOLIC DYSFUNCTION, AORTIC VALVE DISEASE ---i don't think there is any role for pulmonary vasodilator treatment in this situation----I did bring up the issue of right heart catheterization to better define the etiology of pulmonary hypertension----patient at this time feels that he is exercise  tolerance is acceptable-----HE  HAS  AORTIC REGURGITATION AND IS  BEING MANAGED BY DR ESTRELLA  [facility]----- -  keep euvolemic- on lasix.    3--JC- on nightly bipap and benefits from its use (77% compliant per download data)---USING NASAL PILLOWS --- compliance report reviewed has slight leak with the mask----need appointment for mask fitting  4 --annual PFT  5-  f/u in 6 m   -Thanks for allowing  me to participate  in the care of this patient.  Patient at this time  will follow  the above mentioned recommendations and return back for follow up visit. If you have any questions  I can be reached  at #  [phone].     Galilea Car MD, [facility]

## 2024-08-13 NOTE — DISCHARGE NOTE NURSING/CASE MANAGEMENT/SOCIAL WORK - NSPROEXTENSIONSOFSELF_GEN_A_NUR
HCC coding opportunities       Chart reviewed, no opportunity found: CHART REVIEWED, NO OPPORTUNITY FOUND        Patients Insurance     Medicare Insurance: Medicare          
none

## 2024-08-26 ENCOUNTER — RX RENEWAL (OUTPATIENT)
Age: 89
End: 2024-08-26

## 2024-08-31 ENCOUNTER — RX RENEWAL (OUTPATIENT)
Age: 89
End: 2024-08-31

## 2024-10-16 ENCOUNTER — TRANSCRIPTION ENCOUNTER (OUTPATIENT)
Age: 89
End: 2024-10-16

## 2024-11-05 ENCOUNTER — TRANSCRIPTION ENCOUNTER (OUTPATIENT)
Age: 89
End: 2024-11-05

## 2024-11-05 RX ORDER — AZELASTINE HYDROCHLORIDE 137 UG/1
137 SPRAY, METERED NASAL TWICE DAILY
Qty: 3 | Refills: 4 | Status: ACTIVE | COMMUNITY
Start: 2024-11-05 | End: 1900-01-01

## 2024-11-08 ENCOUNTER — RX RENEWAL (OUTPATIENT)
Age: 89
End: 2024-11-08

## 2025-01-28 NOTE — PHYSICAL THERAPY INITIAL EVALUATION ADULT - BALANCE TRAINING, PT EVAL
Goal Outcome Evaluation:  Plan of Care Reviewed With: patient        Progress: no change  Outcome Evaluation: Patient remains on RA with VSS, COVID isolation continued. Boosts added to be delivered with each meal. No acute issues during shift, plan of care continued.                              Patient will increase balance 1/2 grade in 1 week in order to increase safety at home

## 2025-01-31 ENCOUNTER — NON-APPOINTMENT (OUTPATIENT)
Age: 89
End: 2025-01-31

## 2025-02-04 ENCOUNTER — APPOINTMENT (OUTPATIENT)
Dept: PULMONOLOGY | Facility: CLINIC | Age: 89
End: 2025-02-04
Payer: MEDICARE

## 2025-02-04 VITALS
SYSTOLIC BLOOD PRESSURE: 120 MMHG | OXYGEN SATURATION: 94 % | WEIGHT: 200 LBS | BODY MASS INDEX: 31.02 KG/M2 | HEART RATE: 68 BPM | HEIGHT: 67.5 IN | DIASTOLIC BLOOD PRESSURE: 58 MMHG | TEMPERATURE: 98 F

## 2025-02-04 PROCEDURE — 99214 OFFICE O/P EST MOD 30 MIN: CPT

## 2025-04-17 ENCOUNTER — TRANSCRIPTION ENCOUNTER (OUTPATIENT)
Age: 89
End: 2025-04-17

## 2025-04-25 ENCOUNTER — TRANSCRIPTION ENCOUNTER (OUTPATIENT)
Age: 89
End: 2025-04-25

## 2025-05-20 ENCOUNTER — NON-APPOINTMENT (OUTPATIENT)
Age: 89
End: 2025-05-20

## 2025-05-20 ENCOUNTER — APPOINTMENT (OUTPATIENT)
Dept: UROLOGY | Facility: CLINIC | Age: 89
End: 2025-05-20
Payer: MEDICARE

## 2025-05-20 VITALS
OXYGEN SATURATION: 96 % | RESPIRATION RATE: 16 BRPM | BODY MASS INDEX: 31.02 KG/M2 | HEART RATE: 63 BPM | DIASTOLIC BLOOD PRESSURE: 63 MMHG | WEIGHT: 200 LBS | HEIGHT: 67.5 IN | SYSTOLIC BLOOD PRESSURE: 137 MMHG

## 2025-05-20 DIAGNOSIS — N13.8 BENIGN PROSTATIC HYPERPLASIA WITH LOWER URINARY TRACT SYMPMS: ICD-10-CM

## 2025-05-20 DIAGNOSIS — N40.1 BENIGN PROSTATIC HYPERPLASIA WITH LOWER URINARY TRACT SYMPMS: ICD-10-CM

## 2025-05-20 DIAGNOSIS — N32.81 OVERACTIVE BLADDER: ICD-10-CM

## 2025-05-20 PROCEDURE — 99212 OFFICE O/P EST SF 10 MIN: CPT

## 2025-05-20 PROCEDURE — G2211 COMPLEX E/M VISIT ADD ON: CPT

## 2025-05-21 ENCOUNTER — RX RENEWAL (OUTPATIENT)
Age: 89
End: 2025-05-21

## 2025-08-25 ENCOUNTER — TRANSCRIPTION ENCOUNTER (OUTPATIENT)
Age: 89
End: 2025-08-25

## 2025-08-28 ENCOUNTER — RX RENEWAL (OUTPATIENT)
Age: 89
End: 2025-08-28